# Patient Record
Sex: FEMALE | Race: WHITE | NOT HISPANIC OR LATINO | Employment: FULL TIME
[De-identification: names, ages, dates, MRNs, and addresses within clinical notes are randomized per-mention and may not be internally consistent; named-entity substitution may affect disease eponyms.]

---

## 2017-08-19 ENCOUNTER — HEALTH MAINTENANCE LETTER (OUTPATIENT)
Age: 37
End: 2017-08-19

## 2018-05-14 ENCOUNTER — OFFICE VISIT (OUTPATIENT)
Dept: FAMILY MEDICINE | Facility: CLINIC | Age: 38
End: 2018-05-14
Payer: COMMERCIAL

## 2018-05-14 VITALS
RESPIRATION RATE: 16 BRPM | OXYGEN SATURATION: 99 % | TEMPERATURE: 98 F | WEIGHT: 183.4 LBS | HEIGHT: 67 IN | DIASTOLIC BLOOD PRESSURE: 79 MMHG | SYSTOLIC BLOOD PRESSURE: 114 MMHG | HEART RATE: 68 BPM | BODY MASS INDEX: 28.79 KG/M2

## 2018-05-14 DIAGNOSIS — R21 RASH: ICD-10-CM

## 2018-05-14 DIAGNOSIS — Z80.41 FAMILY HISTORY OF MALIGNANT NEOPLASM OF OVARY: ICD-10-CM

## 2018-05-14 DIAGNOSIS — N30.10 INTERSTITIAL CYSTITIS: Primary | ICD-10-CM

## 2018-05-14 DIAGNOSIS — L71.9 ROSACEA: ICD-10-CM

## 2018-05-14 DIAGNOSIS — T78.40XA ALLERGIC DISORDER, INITIAL ENCOUNTER: ICD-10-CM

## 2018-05-14 PROBLEM — L71.8 OCULAR ROSACEA: Status: ACTIVE | Noted: 2018-04-25

## 2018-05-14 RX ORDER — TRIAMCINOLONE ACETONIDE 1 MG/G
CREAM TOPICAL
Refills: 2 | COMMUNITY
Start: 2018-02-23 | End: 2018-11-26

## 2018-05-14 NOTE — MR AVS SNAPSHOT
After Visit Summary   5/14/2018    Janet Ryder    MRN: [id number]           Patient Information     Date Of Birth          1980        Visit Information        Provider Department      5/14/2018 10:00 AM Leatha Robles MD [facility]        Today's Diagnoses     Interstitial cystitis    -  1    Family history of malignant neoplasm of ovary        Rosacea        Allergic disorder, initial encounter          Care Instructions    [facility] Allergy/Asthma-[facility] - [phone]   [facility]: [facility] [place] [phone]  - GENETICS  Here is the plan from today's visit    1. Interstitial cystitis  Referral to urology    2. Family history of malignant neoplasm of ovary  Referral to Women's Health - when you make the appointment, explain to them what you are looking for.   - GENETICS REFERRAL - INTERNAL    3. Rosacea    4. Allergic disorder, initial encounter  Call to set this up.   - ALLERGY/ASTHMA ADULT REFERRAL - INTERNAL      Come see me back and we will consider further lab testing for vasculitis and/or rheum referral.     Please call or return to clinic if your symptoms don't go away.    Follow up plan  Come see me for pap and breast exam.     Thank you for coming to [facility] today.  Lab Testing:  **If you had lab testing today and your results are reassuring or normal they will be mailed to you or sent through [employer] within 7 days.   **If the lab tests need quick action we will call you with the results.  The phone number we will call with results is # [phone] (home) . If this is not the best number please call our clinic and change the number.  Medication Refills:  If you need any refills please call your pharmacy and they will contact us.   If you need to  your refill at a new pharmacy, please contact the new pharmacy directly. The new pharmacy will help you get your medications transferred faster.   Scheduling:  If you have any  concerns about today's visit or wish to schedule another appointment please call our office during normal business hours [phone] (8-5:00 M-F)  If a referral was made to a [facility] and you don't get a call from central scheduling please call [phone].  If a Mammogram was ordered for you at [facility] call [phone] to schedule or change your appointment.  If you had an XRay/CT/Ultrasound/MRI ordered the number is [phone] to schedule or change your radiology appointment.   Medical Concerns:  If you have urgent medical concerns please call [phone] at any time of the day.  If you have a medical emergency please call [phone].    -------------------------------------------------------------------    Preventive Health Recommendations  Female Ages 26 - 39  Yearly exam:   See your health care provider every year in order to    Review health changes.     Discuss preventive care.      Review your medicines if you your doctor has prescribed any.    Until age 30: Get a Pap test every three years (more often if you have had an abnormal result).    After age 30: Talk to your doctor about whether you should have a Pap test every 3 years or have a Pap test with HPV screening every 5 years.   You do not need a Pap test if your uterus was removed (hysterectomy) and you have not had cancer.  You should be tested each year for STDs (sexually transmitted diseases), if you're at risk.   Talk to your provider about how often to have your cholesterol checked.  If you are at risk for diabetes, you should have a diabetes test (fasting glucose).  Shots: Get a flu shot each year. Get a tetanus shot every 10 years.   Nutrition:     Eat at least 5 servings of fruits and vegetables each day.    Eat whole-grain bread, whole-wheat pasta and brown rice instead of white grains and rice.    Talk to your provider about Calcium and Vitamin D.     Lifestyle    Exercise at least 150 minutes a week (30  minutes a day, 5 days of the week). This will help you control your weight and prevent disease.    Limit alcohol to one drink per day.    No smoking.     Wear sunscreen to prevent skin cancer.    See your dentist every six months for an exam and cleaning.            Follow-ups after your visit        Additional Services     ALLERGY/ASTHMA ADULT REFERRAL - INTERNAL       Your provider has referred you to: [facility] Allergy[facility]Asthma-[facility] - [phone]  [email or web address]    Please be aware that coverage of these services is subject to the terms and limitations of your health insurance plan.  Call member services at your health plan with any benefit or coverage questions.      Please bring the following with you to your appointment:    (1) Any X-Rays, CTs or MRIs which have been performed.  Contact the facility where they were done to arrange for  prior to your scheduled appointment.    (2) List of current medications  (3) This referral request   (4) Any documents/labs given to you for this referral            GENETICS REFERRAL - INTERNAL       Your provider has referred you to: [facility]: [facility] [phone]   [email or web address]    Please be aware that coverage of these services is subject to the terms and limitations of your health insurance plan.  Call member services at your health plan with any benefit or coverage questions.      Please bring the following with you to your appointment:    (1) Any X-Rays, CTs or MRIs which have been performed.  Contact the facility where they were done to arrange for  prior to your scheduled appointment.   (2) List of current medications   (3) This referral request   (4) Any documents/labs given to you for this referral                  Who to contact     Please call your clinic at [phone] to:    Ask questions about your health    Make or cancel  "appointments    Discuss your medicines    Learn about your test results    Speak to your doctor            Additional Information About Your Visit        [employer] Information     [employer] gives you secure access to your electronic health record. If you see a primary care provider, you can also send messages to your care team and make appointments. If you have questions, please call your primary care clinic.  If you do not have a primary care provider, please call [phone] and they will assist you.      [employer]Y is an electronic gateway that provides easy, online access to your medical records. With [employer], you can request a clinic appointment, read your test results, renew a prescription or communicate with your care team.     To access your existing account, please contact your [facility] or call [phone] for assistance.        Care EveryWhere ID     This is your Care EveryWhere ID. This could be used by other organizations to access your [facility] medical records  [id number]        Your Vitals Were     Pulse Temperature Respirations Height Last Period Pulse Oximetry    68 98  F (36.7  C) (Oral) 16 5' 6.73\" (169.5 cm) 04/30/2018 (Exact Date) 99%    BMI (Body Mass Index)                   28.96 kg/m2            Blood Pressure from Last 3 Encounters:   05/14/18 114/79   04/30/15 116/76   12/31/12 94/60    Weight from Last 3 Encounters:   05/14/18 183 lb 6.4 oz (83.2 kg)   12/31/12 165 lb (74.8 kg)   10/18/12 172 lb 6.4 oz (78.2 kg)              We Performed the Following     ALLERGY/ASTHMA ADULT REFERRAL - INTERNAL     GENETICS REFERRAL - INTERNAL          Today's Medication Changes          These changes are accurate as of 5/14/18 11:15 AM.  If you have any questions, ask your nurse or doctor.               Stop taking these medicines if you haven't already. Please contact your care team if you have questions.     cyclobenzaprine 10 MG tablet   Commonly known as:  FLEXERIL "   Stopped by:  Leatha Robles MD                    Primary Care Provider Office Phone # Fax #    Melinda Lewis, [place] -[phone] [address] [place] [address]        Equal Access to Services     JOE FORREST : Hadii aad ku hadasho Soomaali, waaxda luqadaha, qaybta kaalmada adeegyada, waxay idiin hayaan adearoldo khespinozasonam schwartz . So [facility] [phone].    ATENCIÓN: Si habla español, tiene a hernandez disposición servicios gratuitos de asistencia lingüística. Llame al [phone].    We comply with applicable federal civil rights laws and [place] laws. We do not discriminate on the basis of race, color, national origin, age, disability, sex, sexual orientation, or gender identity.            Thank you!     Thank you for choosing [facility]  for your care. Our goal is always to provide you with excellent care. Hearing back from our patients is one way we can continue to improve our services. Please take a few minutes to complete the written survey that you may receive in the mail after your visit with us. Thank you!             Your Updated Medication List - Protect others around you: Learn how to safely use, store and throw away your medicines at www.disposemymeds.org.          This list is accurate as of 5/14/18 11:15 AM.  Always use your most recent med list.                   Brand Name Dispense Instructions for use Diagnosis    DAILY MULTIVITAMIN PO      Take  by mouth.        FISH OIL PO      Take  by mouth.        fluticasone 50 MCG/ACT spray    FLONASE    3 Package    Spray 1 spray into both nostrils daily.    Seasonal allergies       triamcinolone 0.1 % cream    KENALOG     CHANELLE EXT AA BID        VITAMIN D PO      Take 5,000 Units by mouth.

## 2018-05-14 NOTE — PROGRESS NOTES
"  Female Physical Note          HPI         Concerns today:   Concern: Establish care- CPE   1. Patient's mother had ovarian cancer. She would like to be tested for that gene. Mother diagnosed early 60s.  Her mother  in her 40s. Maternal aunt had early hysterectomy and her great grandmother had some cancer that don't know.     2. Abdominal pain- x's 3 years on and off. Lower mid pain. Difficult trying to urinate right away. Bladder feels inflamed. Per [facility] she has inflammation of the bladder but no change in pain. Hx of constipation. No vaginal pain w/ urination  - comes and goes with severity, doesn't hurt when urinates, but at times feels like she can't start urination,  and that can be painful to point walks funny - can last for 2 weeks.  Never seen by urology.  Told she might have interstitial cystitis.     3. Sickness- in December her ceiling had water damage. Since then she has been feeling \"sick\" per pt on and off. She has had the flu, colds, chills, night sweats. A rash on both elbows. Used Kenolog cream but stopped using. Her  never gets sick per pt.     4. Also more constipation in the last few years.  She is a vegetarian/ pescatarian.      5. Has been told she has rosacea in her eyes, rosacea in her face. More when she eats and drinks stuff- but not pinned down to specific foods.       IN general feels like she is sick more than others. Sick of being ill.   December - had flu and VGE, then bronchitis and never has felt better from that.  House is really old and thought maybe if she was exposed to something there. Has also been very sick since then.     Also had a rash in December - painful, itchy, felt a bit like hives, breaking open, then got rashes on her elbows, most notable after showers. Ring around her elbows with some bruising after. Never had ezcema or other skin issues before. Does feel achey a lot, and her elbows are more achey and painful. Also has had more achey kness, and " thinks she might have hurt her right knee - is swollen after walking through [place] for a few weeks.     Been seen 4 times since December and did a bunch of tests - they did test for some food allergies.  Seen in [facility].  CRP was elevated. ESR - 9. TSH nl but low nl. Celiac was negative.    Hammer toe fixed and cyst removed.       Patient Active Problem List   Diagnosis     iamc SPRAIN SACROILIAC     iamc SPRAIN THORACIC REGION     Lumbosacral ligament sprain     iamc OTHER BACK SYMPTOMS     Contraception     CARDIOVASCULAR SCREENING; LDL GOAL LESS THAN 160     Abdominal pain     Seasonal allergies     Atypical migraine       Past Medical History:   Diagnosis Date     Atypical migraine        Previous Medical Care - [facility] and [facility] for cyst removals and foot surgery.       Family History   Problem Relation Age of Onset     Hypertension Mother      Lung Cancer Mother      Ovarian Cancer Mother      Alcohol/Drug Father      GASTROINTESTINAL DISEASE Maternal Grandmother      cerosis of liver     Cancer - colorectal Maternal Grandfather      age 75     CANCER Maternal Grandfather      melanoma     Respiratory Maternal Grandfather      Glaucoma Maternal Grandfather      CEREBROVASCULAR DISEASE Paternal Grandfather      Aneurysm Maternal Uncle      brain              Review of Systems:     Review of Systems:  See above - multiple positives  Sleep:   Do you snore most or the night (as reported by a family member)? No  Do you feel sleepy or extremely tired during most of the day? No             Social History     Social History     Social History     Marital status: Single     Spouse name: N/A     Number of children: N/A     Years of education: N/A     Occupational History      None           Social History Main Topics     Smoking status: Never Smoker     Smokeless tobacco: Never Used     Alcohol use Yes      Comment: 3 or 4 drinks per week     Drug use: No     Sexual  "activity: Yes     Partners: Male     Birth control/ protection: None     Other Topics Concern     Parent/Sibling W/ Cabg, Mi Or Angioplasty Before 65f 55m? No     Social History Narrative    Caffeine intake/servings daily - 1-2    Calcium intake/servings daily - 2+    Exercise 3 times weekly - describe swim, wts, basketball    Sunscreen used - Yes    Seatbelts used - Yes    Guns stored in the home - No    Self Breast Exam - No    Pap test up to date -  Yes    Eye exam up to date -  Yes    Dental exam up to date -  Yes    DEXA scan up to date -  Not Applicable    Flex Sig/Colonoscopy up to date -  Not Applicable    Mammography up to date -  Not Applicable    Immunizations reviewed and up to date - Yes    Abuse: Current or Past (Physical, Sexual or Emotional) - No    Do you feel safe in your environment - Yes    Do you cope well with stress - No    Do you suffer from insomnia - Yes    Last updated by: CARLOS CROSS  2011                           Marital Status:  Who lives in your household?     Has anyone hurt you physically, for example by pushing, hitting, slapping or kicking you or forcing you to have sex? NO  Do you feel threatened or controlled by a partner, ex-partner or anyone in your life? No    Sexual Health     Pregnancy History:   LMP Patient's last menstrual period was 2018 (exact date). recent  Last Pap Smear Date:   Lab Results   Component Value Date    PAP NIL 10/18/2012    PAP NIL 2011    PAP NIL 2010     Abnormal Pap History: None    Recommended Screening     Pap/HPV cotest every 5 years for women 30-65   Recommended and will postpone due to time constraints. and HIV screening:  Not discussed             Physical Exam:     Vitals: /79  Pulse 68  Temp 98  F (36.7  C) (Oral)  Resp 16  Ht 5' 6.73\" (169.5 cm)  Wt 183 lb 6.4 oz (83.2 kg)  LMP 2018 (Exact Date)  SpO2 99%  BMI 28.96 kg/m2  BMI= Body mass index is 28.96 kg/(m^2).   GENERAL: " healthy, alert and no distress  EYES: Eyes grossly normal to inspection, extraocular movements - intact, and PERRL  HENT: ear canals- normal; TMs- normal; Nose- normal; Mouth- no ulcers, no lesions  NECK: no tenderness, no adenopathy, no asymmetry, no masses, no stiffness; thyroid- normal to palpation  RESP: lungs clear to auscultation - no rales, no rhonchi, no wheezes  CV: regular rates and rhythm, normal S1 S2, no S3 or S4 and no murmur, no click or rub -  ABDOMEN: soft, no tenderness, no  hepatosplenomegaly, no masses, normal bowel sounds  MS: extremities- no gross deformities noted, no edema - all her joints are cool and no effusions noted.  Right knee with some crepitus and pain on patellar grind.  Nl ROM, no warmth or effusion.   SKIN: no suspicious lesions, no rashes  PSYCH: Alert and oriented times 3; speech- coherent , normal rate and volume; able to articulate logical thoughts, able to abstract reason, no tangential thoughts, no hallucinations or delusions, affect- normal  LYMPHATICS: ant. cervical- normal, post. cervical- normal, axillary- normal, supraclavicular- normal,       Assessment and Plan      Janet was seen today for establish care, abdominal pain and sick.    Diagnoses and all orders for this visit:    Interstitial cystitis - likely her diagnosis. Reviewed that her symptoms of intermittent difficulty passing urine may be part of this, that through PT and therapy can improve. Will refer to urology to confirm diagnosis and begin treatment plan.     Family history of malignant neoplasm of ovary - will refer to genetics to assure screening as appropriate. Reviewed that screening is not straightforward.   -     GENETICS REFERRAL - INTERNAL    Rosacea    Allergic disorder, initial encounter - she is very worried about mold, and the black liquid that came from her ceiling. Also noted mice and bats and so believe it is best that she see an allergist to review her concerns and better target testing  than for me to pick a panel,    -     ALLERGY/ASTHMA ADULT REFERRAL - INTERNAL    Did not have time for gyn and breast exam and so will return for these.     Rash - saw pictures of her rash - had ring of hyperemia around her elbows and then areas of bruising right after with some more erythematous papules. Had CRP that was elevated at past check so wonder if vasculitis?  Will look into how might work that up other than biopsy. Currently elbows are nl in appearance.   Consider ANCA, CRP and UA at next visit    Options for treatment and follow-up care were reviewed with the patient . Janet Ryder and/or guardian engaged in the decision making process and verbalized understanding of the options discussed and agreed with the final plan.    Lana Quintanilla, CMA

## 2018-05-14 NOTE — PATIENT INSTRUCTIONS
[facility] Allergy/Asthma-[facility] [place] - [phone]   [facility]: [facility] [phone]  - GENETICS  Here is the plan from today's visit    1. Interstitial cystitis  Referral to urology    2. Family history of malignant neoplasm of ovary  Referral to Women's Health - when you make the appointment, explain to them what you are looking for.   - GENETICS REFERRAL - INTERNAL    3. Rosacea    4. Allergic disorder, initial encounter  Call to set this up.   - ALLERGY/ASTHMA ADULT REFERRAL - INTERNAL      Come see me back and we will consider further lab testing for vasculitis and/or rheum referral.     Please call or return to clinic if your symptoms don't go away.    Follow up plan  Come see me for pap and breast exam.     Thank you for coming to [facility] today.  Lab Testing:  **If you had lab testing today and your results are reassuring or normal they will be mailed to you or sent through [employer] within 7 days.   **If the lab tests need quick action we will call you with the results.  The phone number we will call with results is # [phone] (home) . If this is not the best number please call our clinic and change the number.  Medication Refills:  If you need any refills please call your pharmacy and they will contact us.   If you need to  your refill at a new pharmacy, please contact the new pharmacy directly. The new pharmacy will help you get your medications transferred faster.   Scheduling:  If you have any concerns about today's visit or wish to schedule another appointment please call our office during normal business hours [phone] (8-5:00 M-F)  If a referral was made to a [facility] and you don't get a call from central scheduling please call [phone].  If a Mammogram was ordered for you at [facility] call [phone] to schedule or change your appointment.  If you had an XRay/CT/Ultrasound/MRI ordered the number is [phone]  to schedule or change your radiology appointment.   Medical Concerns:  If you have urgent medical concerns please call [phone] at any time of the day.  If you have a medical emergency please call [phone].    -------------------------------------------------------------------    Preventive Health Recommendations  Female Ages 26 - 39  Yearly exam:   See your health care provider every year in order to    Review health changes.     Discuss preventive care.      Review your medicines if you your doctor has prescribed any.    Until age 30: Get a Pap test every three years (more often if you have had an abnormal result).    After age 30: Talk to your doctor about whether you should have a Pap test every 3 years or have a Pap test with HPV screening every 5 years.   You do not need a Pap test if your uterus was removed (hysterectomy) and you have not had cancer.  You should be tested each year for STDs (sexually transmitted diseases), if you're at risk.   Talk to your provider about how often to have your cholesterol checked.  If you are at risk for diabetes, you should have a diabetes test (fasting glucose).  Shots: Get a flu shot each year. Get a tetanus shot every 10 years.   Nutrition:     Eat at least 5 servings of fruits and vegetables each day.    Eat whole-grain bread, whole-wheat pasta and brown rice instead of white grains and rice.    Talk to your provider about Calcium and Vitamin D.     Lifestyle    Exercise at least 150 minutes a week (30 minutes a day, 5 days of the week). This will help you control your weight and prevent disease.    Limit alcohol to one drink per day.    No smoking.     Wear sunscreen to prevent skin cancer.    See your dentist every six months for an exam and cleaning.

## 2018-06-01 ENCOUNTER — PRE VISIT (OUTPATIENT)
Dept: UROLOGY | Facility: CLINIC | Age: 38
End: 2018-06-01

## 2018-06-01 ENCOUNTER — OFFICE VISIT (OUTPATIENT)
Dept: FAMILY MEDICINE | Facility: CLINIC | Age: 38
End: 2018-06-01
Payer: COMMERCIAL

## 2018-06-01 VITALS
SYSTOLIC BLOOD PRESSURE: 102 MMHG | TEMPERATURE: 98 F | WEIGHT: 183 LBS | DIASTOLIC BLOOD PRESSURE: 70 MMHG | HEIGHT: 67 IN | BODY MASS INDEX: 28.72 KG/M2 | HEART RATE: 77 BPM | RESPIRATION RATE: 16 BRPM | OXYGEN SATURATION: 97 %

## 2018-06-01 DIAGNOSIS — Z00.00 ROUTINE GENERAL MEDICAL EXAMINATION AT A HEALTH CARE FACILITY: Primary | ICD-10-CM

## 2018-06-01 DIAGNOSIS — R21 RASH: ICD-10-CM

## 2018-06-01 DIAGNOSIS — M25.561 PATELLOFEMORAL ARTHRALGIA OF RIGHT KNEE: ICD-10-CM

## 2018-06-01 LAB
BILIRUBIN UR: NEGATIVE
BLOOD UR: ABNORMAL
CHOLEST SERPL-MCNC: 146.2 MG/DL (ref 0–200)
CHOLEST/HDLC SERPL: 2.9 {RATIO} (ref 0–5)
CRP SERPL-MCNC: <2.9 MG/L (ref 0–8)
GLUCOSE URINE: NEGATIVE
HDLC SERPL-MCNC: 51 MG/DL
HIV 1+2 AB+HIV1 P24 AG SERPL QL IA: NONREACTIVE
KETONES UR QL: NEGATIVE
LDLC SERPL CALC-MCNC: 88 MG/DL (ref 0–129)
LEUKOCYTE ESTERASE UR: ABNORMAL
NITRITE UR QL STRIP: NEGATIVE
PH UR STRIP: 7 [PH] (ref 5–7)
PROTEIN UR: NEGATIVE
SP GR UR STRIP: 1.01
TRIGL SERPL-MCNC: 36 MG/DL (ref 0–150)
UROBILINOGEN UR STRIP-ACNC: ABNORMAL
VLDL CHOLESTEROL: 7.2 MG/DL (ref 7–32)

## 2018-06-01 NOTE — MR AVS SNAPSHOT
After Visit Summary   6/1/2018    Janet Ryder    MRN: [id number]           Patient Information     Date Of Birth          1980        Visit Information        Provider Department      6/1/2018 9:00 AM Leatha Robles MD [facility]        Today's Diagnoses     Routine general medical examination at a health care facility    -  1    Rash        Patellofemoral arthralgia of right knee          Care Instructions    Here is the plan from today's visit    1. Rash - based on these tests, if positive, then refer to rheumatology. If negative, immunologist  - Urinalysis (UA) ([facility])  - Antineutrophil cytoplasmic Joslyn IgG  - CRP inflammation    2. Routine general medical examination at a health care facility  - Lipid [place] ([facility]) and HIV  - HPV High Risk Types DNA Cervical  - Pap imaged thin layer screen with HPV - recommended age 30 - 65 years (select HPV order below)    3. Patellofemoral arthralgia of right knee  Call one number to schedule at any of the above locations: [phone].  [facility] [facility].   - DANIELLE, PT, HAND AND CHIROPRACTIC REFERRAL - DANIELLE      Please call or return to clinic if your symptoms don't go away.    Follow up plan      Thank you for coming to [facility] today.  Lab Testing:  **If you had lab testing today and your results are reassuring or normal they will be mailed to you or sent through [employer] within 7 days.   **If the lab tests need quick action we will call you with the results.  The phone number we will call with results is # [phone] (home) . If this is not the best number please call our clinic and change the number.  Medication Refills:  If you need any refills please call your pharmacy and they will contact us.   If you need to  your refill at a new pharmacy, please contact the new pharmacy directly. The new pharmacy will help you get your medications transferred faster.   Scheduling:  If you have any  concerns about today's visit or wish to schedule another appointment please call our office during normal business hours [phone] (8-5:00 M-F)  If a referral was made to a [facility] and you don't get a call from central scheduling please call [phone].  If a Mammogram was ordered for you at [facility] call [phone] to schedule or change your appointment.  If you had an XRay/CT/Ultrasound/MRI ordered the number is [phone] to schedule or change your radiology appointment.   Medical Concerns:  If you have urgent medical concerns please call [phone] at any time of the day.  If you have a medical emergency please call [phone].          Follow-ups after your visit        Additional Services     DANIELLE, PT, HAND AND CHIROPRACTIC REFERRAL - [facility]       **This order will print in the [facility] Scheduling Office**    Physical Therapy, Hand Therapy and Chiropractic Care are available through:    *[facility]  *[facility]  *[facility]    Call one number to schedule at any of the above locations: [phone].    Your provider has referred you to: Physical Therapy at [facility] or [facility]    Indication/Reason for Referral: Knee Pain  Onset of Illness: months   Therapy Orders: Evaluate and Treat  Special Programs: None  Special Request: None    Maritza Salas      Additional Comments for the Therapist or Chiropractor: none    Please be aware that coverage of these services is subject to the terms and limitations of your health insurance plan.  Call member services at your health plan with any benefit or coverage questions.      Please bring the following to your appointment:    *Your personal calendar for scheduling future appointments  *Comfortable clothing                  Your next 10 appointments already scheduled     Jun 21, 2018  7:30 AM CDT   (Arrive by 7:15 AM)   NEW FEMALE PELVIC with Emily See Adán Snell MD    [facility]  "for Prostate and Urologic Cancers ([facility] [place])    [address]  [address] [facility] [address] [phone]              Who to contact     Please call your clinic at [phone] to:    Ask questions about your health    Make or cancel appointments    Discuss your medicines    Learn about your test results    Speak to your doctor            Additional Information About Your Visit        [employer] Information     [employer] gives you secure access to your electronic health record. If you see a primary care provider, you can also send messages to your care team and make appointments. If you have questions, please call your primary care clinic.  If you do not have a primary care provider, please call [phone] and they will assist you.      [employer] is an electronic gateway that provides easy, online access to your medical records. With [employer], you can request a clinic appointment, read your test results, renew a prescription or communicate with your care team.     To access your existing account, please contact your [facility] or call [phone] for assistance.        Care EveryWhere ID     This is your Care EveryWhere ID. This could be used by other organizations to access your [place] medical records  [id number]        Your Vitals Were     Pulse Temperature Respirations Height Pulse Oximetry Breastfeeding?    77 98  F (36.7  C) (Oral) 16 5' 6.73\" (169.5 cm) 97% No    BMI (Body Mass Index)                   28.89 kg/m2            Blood Pressure from Last 3 Encounters:   06/01/18 102/70   05/14/18 114/79   04/30/15 116/76    Weight from Last 3 Encounters:   06/01/18 183 lb (83 kg)   05/14/18 183 lb 6.4 oz (83.2 kg)   12/31/12 165 lb (74.8 kg)              We Performed the Following     Antineutrophil cytoplasmic Joslyn IgG     CRP inflammation     HIV Antigen Antibody Combo     HPV High Risk Types DNA Cervical     DANIELLE, PT, HAND AND CHIROPRACTIC " REFERRAL - DANIELLE     Lipid [place] (hospitals)     Pap imaged thin layer screen with HPV - recommended age 30 - 65 years (select HPV order below)     Urinalysis (UA) (hospitals)        Primary Care Provider Office Phone # Fax #    KOSTAS Bailon [phone] [address] [place] [address]        Equal Access to Services     JOE FORREST : Hadii aad ku hadasho Soomaali, waaxda luqadaha, qaybta kaalmada adeegyada, waxay idiin hayaan adeeg kharash la'aan . So [facility] [phone].    ATENCIÓN: Si loanla espnathalie, tiene a hernandez disposición servicios gratuitos de asistencia lingüística. Llame al [phone].    We comply with applicable federal civil rights laws and [place] laws. We do not discriminate on the basis of race, color, national origin, age, disability, sex, sexual orientation, or gender identity.            Thank you!     Thank you for choosing [facility]  for your care. Our goal is always to provide you with excellent care. Hearing back from our patients is one way we can continue to improve our services. Please take a few minutes to complete the written survey that you may receive in the mail after your visit with us. Thank you!             Your Updated Medication List - Protect others around you: Learn how to safely use, store and throw away your medicines at www.disposemymeds.org.          This list is accurate as of 6/1/18  9:59 AM.  Always use your most recent med list.                   Brand Name Dispense Instructions for use Diagnosis    DAILY MULTIVITAMIN PO      Take  by mouth.        FISH OIL PO      Take  by mouth.        fluticasone 50 MCG/ACT spray    FLONASE    3 Package    Spray 1 spray into both nostrils daily.    Seasonal allergies       triamcinolone 0.1 % cream    KENALOG     CHANELLE EXT AA BID        VITAMIN D PO      Take 5,000 Units by mouth.

## 2018-06-01 NOTE — PATIENT INSTRUCTIONS
Here is the plan from today's visit    1. Rash - based on these tests, if positive, then refer to rheumatology. If negative, immunologist  - Urinalysis (UA) ([facility])  - Antineutrophil cytoplasmic Joslyn IgG  - CRP inflammation    2. Routine general medical examination at a health care facility  - Lipid [place] ([facility]) and HIV  - HPV High Risk Types DNA Cervical  - Pap imaged thin layer screen with HPV - recommended age 30 - 65 years (select HPV order below)    3. Patellofemoral arthralgia of right knee  Call one number to schedule at any of the above locations: [phone].  [place] [facility].   - DANIELLE, PT, HAND AND CHIROPRACTIC REFERRAL - DANIELLE      Please call or return to clinic if your symptoms don't go away.    Follow up plan      Thank you for coming to [facility] today.  Lab Testing:  **If you had lab testing today and your results are reassuring or normal they will be mailed to you or sent through [employer] within 7 days.   **If the lab tests need quick action we will call you with the results.  The phone number we will call with results is # [phone] (home) . If this is not the best number please call our clinic and change the number.  Medication Refills:  If you need any refills please call your pharmacy and they will contact us.   If you need to  your refill at a new pharmacy, please contact the new pharmacy directly. The new pharmacy will help you get your medications transferred faster.   Scheduling:  If you have any concerns about today's visit or wish to schedule another appointment please call our office during normal business hours [phone] (8-5:00 M-F)  If a referral was made to a [facility] and you don't get a call from central scheduling please call [phone].  If a Mammogram was ordered for you at [facility] call [phone] to schedule or change your appointment.  If you had an XRay/CT/Ultrasound/MRI ordered the number is  [phone] to schedule or change your radiology appointment.   Medical Concerns:  If you have urgent medical concerns please call [phone] at any time of the day.  If you have a medical emergency please call [phone].

## 2018-06-01 NOTE — PROGRESS NOTES
"      HPI:       Janet Ryder is a 38 year old who presents for the following  Patient presents with:  Physical: Needs to have pap  Pain: In right knee, was walking more than normal while on vacation [id number] ago    Has lumpy breasts that change all the time. Gets freaked out - so told not to do them. Had a pocket drained on the right -     Allergy not available until 9/2018 and hoping that she can find someone else    Also gets sick all the time     Right knee is still problematic, hurts to go up and down stairs and hurts to fully stretch it out.  Has not done any exercises.  Cracking more.     History of hip dysplasia - they did not figure it out until she was in her late 20s, and then at times she notices that her hip slides out of place which has stopped her from running. Also been told that her legs are not the same length.  Tries swimming more instead of exercise.       Concern: Pap   Description of the problem :Needs to finish CPE         Adherence and Exercise  Medication side effects: no  How often is a medication missed? Never  Exercise:No exercise None      Joint/Muscle Pain      Onset: 3 Weeks ago    Injury?  Yes Details: Was walking more than normal, didn't hit or fall on it just over use    Description:   Location(s): Right Knee  Character: Dull ache    Intensity: moderate    Progression of Symptoms: same    Accompanying Signs & Symptoms:  Other symptoms: none    History:   Previous similar pain: no      Worsened by    Overuse?: Yes  Morning Stiffness?:no    Alleviating factors:  Improved by: nothing  Therapies Tried and outcome: Nothing    Problem, Medication and Allergy Lists were reviewed and are current.  Patient is an established patient of this clinic.         Review of Systems:   Review of Systems          Physical Exam:   Patient Vitals for the past 24 hrs:   BP Temp Temp src Pulse Resp SpO2 Height Weight   06/01/18 0915 102/70 98  F (36.7  C) Oral 77 16 97 % 5' 6.73\" (169.5 cm) 183 lb (83 " "kg)     Body mass index is 28.89 kg/(m^2).  Vitals were reviewed and were normal  Blood pressure 102/70, pulse 77, temperature 98  F (36.7  C), temperature source Oral, resp. rate 16, height 5' 6.73\" (169.5 cm), weight 183 lb (83 kg), SpO2 97 %, not currently breastfeeding.       Physical Exam   Pulmonary/Chest: Right breast exhibits no inverted nipple, no nipple discharge and no skin change. Left breast exhibits no inverted nipple, no nipple discharge and no skin change.       Had an fibrocystic changes of her breasts.  Multiple irregular nodularities palpable somewhat firm, worse on the right lower quadrant of her right breast and left upper quadrant left breast.   Genitourinary:   Genitourinary Comments: .  Normal pelvic exam vulva, vagina and cervix.  Uterus normal size.  It, no palpable ovaries.     Both knees without swelling warmth or erythema.  Right knee joint line tenderness.  Normal range of motion.  Has some tenderness upon patellar movement and crepitus.    Results:       Assessment and Plan     Janet was seen today for physical and pain.    Diagnoses and all orders for this visit:    Routine general medical examination at a health care facility -routine prevention.  Reassured about her breast exam  -     Lipid [place] ([facility])  -     HPV High Risk Types DNA Cervical  -     Pap imaged thin layer screen with HPV - recommended age 30 - 65 years (select HPV order below)  -     HIV Antigen Antibody Combo    Rash -erythema with ecchymosis at her last visit, so we will do a very basic vasculitis workup.  Talked about having her see the allergist, and if that does not panel that we would consider immunologic workup.  -     Urinalysis (UA) ([facility])  -     Antineutrophil cytoplasmic Joslyn IgG  -     CRP inflammation    Patellofemoral arthralgia of right knee  -exam consistent with  patellofemoral and will -send her for physical therapy.  -     DANIELLE, PT, HAND AND CHIROPRACTIC REFERRAL - DANIELLE      There are no " discontinued medications.  Options for treatment and follow-up care were reviewed with the patient. Janet Ryder  engaged in the decision making process and verbalized understanding of the options discussed and agreed with the final plan.    Leatha Robles MD

## 2018-06-01 NOTE — TELEPHONE ENCOUNTER
MEDICAL RECORDS REQUEST   [facility]  Urology Clinic  [address] [place] [address]  PHONE: [phone]  Fax: [phone]        FUTURE VISIT INFORMATION                                                   aJnet Ryder, : 1980 scheduled for future visit at [facility]    APPOINTMENT INFORMATION:    Date: 2018    Provider:  SUHA GONZALEZ    Reason for Visit/Diagnosis: IC    REFERRAL INFORMATION:    Referring provider:  ELKE METCALF    Specialty: PCP    Referring providers clinic:  [facility] contact number:  [phone]    RECORDS REQUESTED FOR VISIT                                                     NOTES  STATUS/DETAILS   OFFICE NOTE from referring provider  yes   OFFICE NOTE from other specialist  no   DISCHARGE SUMMARY from hospital  no   DISCHARGE REPORT from the ER  no   OPERATIVE REPORT  no   MEDICATION LIST  yes       PRE-VISIT CHECKLIST      Record collection complete Yes   Appointment appropriately scheduled           (right time/right provider) Yes   Joint diagnostic appointment coordinated correctly          (ensure right order & amount of time) Yes   MyChart activation Yes   Questionnaire complete If no, please explain IN PROCESS     Completed by: Suha Bernal

## 2018-06-03 LAB — ANCA IGG TITR SER IF: NORMAL {TITER}

## 2018-06-05 LAB
COPATH REPORT: NORMAL
PAP: NORMAL

## 2018-06-07 ENCOUNTER — THERAPY VISIT (OUTPATIENT)
Dept: PHYSICAL THERAPY | Facility: CLINIC | Age: 38
End: 2018-06-07
Payer: COMMERCIAL

## 2018-06-07 DIAGNOSIS — M25.561 ACUTE PAIN OF RIGHT KNEE: Primary | ICD-10-CM

## 2018-06-07 LAB
FINAL DIAGNOSIS: NORMAL
HPV HR 12 DNA CVX QL NAA+PROBE: NEGATIVE
HPV16 DNA SPEC QL NAA+PROBE: NEGATIVE
HPV18 DNA SPEC QL NAA+PROBE: NEGATIVE
SPECIMEN DESCRIPTION: NORMAL
SPECIMEN SOURCE CVX/VAG CYTO: NORMAL

## 2018-06-07 PROCEDURE — 97161 PT EVAL LOW COMPLEX 20 MIN: CPT | Mod: GP | Performed by: PHYSICAL THERAPIST

## 2018-06-07 PROCEDURE — 97110 THERAPEUTIC EXERCISES: CPT | Mod: GP | Performed by: PHYSICAL THERAPIST

## 2018-06-07 ASSESSMENT — ACTIVITIES OF DAILY LIVING (ADL)
SQUAT: ACTIVITY IS FAIRLY DIFFICULT
GIVING WAY, BUCKLING OR SHIFTING OF KNEE: THE SYMPTOM AFFECTS MY ACTIVITY SLIGHTLY
SIT WITH YOUR KNEE BENT: ACTIVITY IS MINIMALLY DIFFICULT
GO DOWN STAIRS: ACTIVITY IS SOMEWHAT DIFFICULT
PAIN: THE SYMPTOM AFFECTS MY ACTIVITY MODERATELY
WALK: ACTIVITY IS SOMEWHAT DIFFICULT
KNEE_ACTIVITY_OF_DAILY_LIVING_SCORE: 57.14
LIMPING: THE SYMPTOM AFFECTS MY ACTIVITY SLIGHTLY
WEAKNESS: THE SYMPTOM AFFECTS MY ACTIVITY SLIGHTLY
RAW_SCORE: 40
KNEEL ON THE FRONT OF YOUR KNEE: ACTIVITY IS SOMEWHAT DIFFICULT
GO UP STAIRS: ACTIVITY IS SOMEWHAT DIFFICULT
HOW_WOULD_YOU_RATE_THE_CURRENT_FUNCTION_OF_YOUR_KNEE_DURING_YOUR_USUAL_DAILY_ACTIVITIES_ON_A_SCALE_FROM_0_TO_100_WITH_100_BEING_YOUR_LEVEL_OF_KNEE_FUNCTION_PRIOR_TO_YOUR_INJURY_AND_0_BEING_THE_INABILITY_TO_PERFORM_ANY_OF_YOUR_USUAL_DAILY_ACTIVITIES?: 65
STIFFNESS: THE SYMPTOM AFFECTS MY ACTIVITY MODERATELY
KNEE_ACTIVITY_OF_DAILY_LIVING_SUM: 40
HOW_WOULD_YOU_RATE_THE_OVERALL_FUNCTION_OF_YOUR_KNEE_DURING_YOUR_USUAL_DAILY_ACTIVITIES?: ABNORMAL
AS_A_RESULT_OF_YOUR_KNEE_INJURY,_HOW_WOULD_YOU_RATE_YOUR_CURRENT_LEVEL_OF_DAILY_ACTIVITY?: SEVERELY ABNORMAL
SWELLING: THE SYMPTOM AFFECTS MY ACTIVITY MODERATELY
STAND: ACTIVITY IS MINIMALLY DIFFICULT
RISE FROM A CHAIR: ACTIVITY IS SOMEWHAT DIFFICULT

## 2018-06-07 NOTE — MR AVS SNAPSHOT
After Visit Summary   6/7/2018    Janet Ryder    MRN: [id number]           Patient Information     Date Of Birth          1980        Visit Information        Provider Department      6/7/2018 4:40 PM Ceferino Chan, PT [facility]        Today's Diagnoses     Acute pain of right knee    -  1       Follow-ups after your visit        Your next 10 appointments already scheduled     Jun 13, 2018  1:20 PM CDT   DANIELLE Extremity with Ceferino Chan PT   [facility] ([facility]  )    [address] [place] [id number]   [phone]            Jun 19, 2018  4:00 PM CDT   DANIELLE Extremity with Ceferino Chan PT   [facility] ([facility]  )    [address] [place] [address]   [phone]            Jun 21, 2018  7:30 AM CDT   (Arrive by 7:15 AM)   NEW FEMALE PELVIC with Emily See Adán Snell MD   [facility] for Prostate and Urologic Cancers ([facility])    [address] [place] [address] [phone]              Who to contact     If you have questions or need follow up information about today's clinic visit or your schedule please contact [facility] directly at [phone].  Normal or non-critical lab and imaging results will be communicated to you by MyChart, letter or phone within 4 business days after the clinic has received the results. If you do not hear from us within 7 days, please contact the clinic through MyChart or phone. If you have a critical or abnormal lab result, we will notify you by phone as soon as possible.  Submit refill requests through [employer] or call your pharmacy and they will forward the refill request to us. Please allow 3 business days for your refill to be  completed.          Additional Information About Your Visit        [employer] Information     [employer] gives you secure access to your electronic health record. If you see a primary care provider, you can also send messages to your care team and make appointments. If you have questions, please call your primary care clinic.  If you do not have a primary care provider, please call [phone] and they will assist you.        Care EveryWhere ID     This is your Care EveryWhere ID. This could be used by other organizations to access your [place] medical records  [id number]         Blood Pressure from Last 3 Encounters:   06/01/18 102/70   05/14/18 114/79   04/30/15 116/76    Weight from Last 3 Encounters:   06/01/18 83 kg (183 lb)   05/14/18 83.2 kg (183 lb 6.4 oz)   12/31/12 74.8 kg (165 lb)              We Performed the Following     DANIELLE Inital Eval Report     PT Eval, Low Complexity ([id number])     Therapeutic Exercises        Primary Care Provider Office Phone # Fax #    KOSTAS Bailon [facility] [phone] [address] [place] [address]        Equal Access to Services     JOE FORREST AH: Hadii aad ku hadasho Soomaali, waaxda luqadaha, qaybta kaalmada adeegyada, waxay evangelistin haykiannan reta schwartz ah. So [facility] [phone].    ATENCIÓN: Si habla español, tiene a hernandez disposición servicios gratuitos de asistencia lingüística. Melodie al [phone].    We comply with applicable federal civil rights laws and [place] laws. We do not discriminate on the basis of race, color, national origin, age, disability, sex, sexual orientation, or gender identity.            Thank you!     Thank you for choosing [facility]  for your care. Our goal is always to provide you with excellent care. Hearing back from our patients is one way we can continue to improve our services. Please take a few minutes to complete the written survey that you  may receive in the mail after your visit with us. Thank you!             Your Updated Medication List - Protect others around you: Learn how to safely use, store and throw away your medicines at www.disposemymeds.org.          This list is accurate as of 6/7/18 11:59 PM.  Always use your most recent med list.                   Brand Name Dispense Instructions for use Diagnosis    DAILY MULTIVITAMIN PO      Take  by mouth.        FISH OIL PO      Take  by mouth.        fluticasone 50 MCG/ACT spray    FLONASE    3 Package    Spray 1 spray into both nostrils daily.    Seasonal allergies       triamcinolone 0.1 % cream    KENALOG     CHANELLE EXT AA BID        VITAMIN D PO      Take 5,000 Units by mouth.

## 2018-06-07 NOTE — PROGRESS NOTES
[facility] Initial Evaluation  Subjective:  Patient is a 38 year old female presenting with rehab right knee hpi.   Janet Ryder is a 38 year old female with a right knee condition.  Condition occurred with:  Repetition/overuse.  Condition occurred: in the community.  This is a new condition  May 2018. Patient started having R knee pain with prolonged walking during trip to [place] about 4 weeks ago. Denies history of R knee problems..    Patient reports pain:  Anterior, sub patellar, medial and lateral.    Pain is described as aching and sharp and is constant and reported as 3/10 and 6/10.  Associated symptoms:  Edema. Pain is worse during the day.  Symptoms are exacerbated by descending stairs, ascending stairs, walking and bending/squatting and relieved by rest.  Since onset symptoms are unchanged.        General health as reported by patient is fair (decreased fitnes level).  Pertinent medical history includes:  None.  Medical allergies: yes (see chart for complete list).  Other surgeries include:  None reported.  Current medications:  Other (Flonase for seasonal allergies).  Current occupation is Commercial mortgage/banking.  Patient is currently not working due to present treatment problem.  Primary job tasks include:  Other (computer work).    Barriers include:  None as reported by the patient.    Red flags:  None as reported by the patient.                        Objective:    Gait:    Gait Type:  Normal   Assistive Devices:  None  Deviations:  Hip:  Decr dynamic control R and decr dynamic control LKnee:  Transverse plane rotation R and transverse plane rotation L                                                      Knee Evaluation:  ROM:    AROM    Hyperextension:  Left:  WNL    Right: WNL +    Flexion: Left: WNL    Right: 136 -/+  PROM        Flexion: Left:   Right:  138 -/+      Strength:         Quad Set Left:  WNL    Pain: -   Quad Set Right:  WNL    Pain: +        Edema:  Edema  of the knee: Slight edema R knee.      Functional Testing:          Quad:    Single Leg Squat:  Left:       Right:        Bilateral Leg Squat:  R knee pain  Femoral IR, moderate loss of control and excessive anterior knee excursion              General     ROS    Assessment/Plan:    Patient is a 38 year old female with right side knee complaints.    Patient has the following significant findings with corresponding treatment plan.                Diagnosis 1:  Knee pain  Pain -  hot/cold therapy, manual therapy, splint/taping/bracing/orthotics, self management, education and home program  Decreased ROM/flexibility - manual therapy, therapeutic exercise and home program  Decreased strength - therapeutic exercise, therapeutic activities and home program  Decreased proprioception - neuro re-education, therapeutic activities and home program  Impaired muscle performance - neuro re-education and home program  Decreased function - therapeutic activities and home program    Therapy Evaluation Codes:   1) History comprised of:   Personal factors that impact the plan of care:      None.    Comorbidity factors that impact the plan of care are:      None.     Medications impacting care: None.  2) Examination of Body Systems comprised of:   Body structures and functions that impact the plan of care:      Knee.   Activity limitations that impact the plan of care are:      Sitting, Squatting/kneeling, Stairs, Standing and Walking.  3) Clinical presentation characteristics are:   Stable/Uncomplicated.  4) Decision-Making    Low complexity using standardized patient assessment instrument and/or measureable assessment of functional outcome.  Cumulative Therapy Evaluation is: Low complexity.    Previous and current functional limitations:  (See Goal Flow Sheet for this information)    Short term and Long term goals: (See Goal Flow Sheet for this information)     Communication ability:  Patient appears to be able to clearly communicate  and understand verbal and written communication and follow directions correctly.  Treatment Explanation - The following has been discussed with the patient:   RX ordered/plan of care  Anticipated outcomes  Possible risks and side effects  This patient would benefit from PT intervention to resume normal activities.   Rehab potential is good.    Frequency:  1 X week, once daily  Duration:  for 4 weeks tapering to 1 X every other week over 4 weeks  Discharge Plan:  Achieve all LTG.  Independent in home treatment program.  Reach maximal therapeutic benefit.    Please refer to the daily flowsheet for treatment today, total treatment time and time spent performing 1:1 timed codes.

## 2018-06-12 PROBLEM — M25.561 ACUTE PAIN OF RIGHT KNEE: Status: ACTIVE | Noted: 2018-06-12

## 2018-06-13 ENCOUNTER — THERAPY VISIT (OUTPATIENT)
Dept: PHYSICAL THERAPY | Facility: CLINIC | Age: 38
End: 2018-06-13
Payer: COMMERCIAL

## 2018-06-13 DIAGNOSIS — M25.561 ACUTE PAIN OF RIGHT KNEE: ICD-10-CM

## 2018-06-13 PROCEDURE — 97110 THERAPEUTIC EXERCISES: CPT | Mod: GP | Performed by: PHYSICAL THERAPIST

## 2018-06-13 PROCEDURE — 97140 MANUAL THERAPY 1/> REGIONS: CPT | Mod: GP | Performed by: PHYSICAL THERAPIST

## 2018-06-13 PROCEDURE — 97112 NEUROMUSCULAR REEDUCATION: CPT | Mod: GP | Performed by: PHYSICAL THERAPIST

## 2018-06-14 ENCOUNTER — PRE VISIT (OUTPATIENT)
Dept: UROLOGY | Facility: CLINIC | Age: 38
End: 2018-06-14

## 2018-06-14 NOTE — TELEPHONE ENCOUNTER
Reason for visit: IC consult     Relevant information: pt has never seen a urologist    Records/imaging/labs: referral note available    Pt called: No need for a call    Rooming: dip/pvr

## 2018-06-19 ENCOUNTER — THERAPY VISIT (OUTPATIENT)
Dept: PHYSICAL THERAPY | Facility: CLINIC | Age: 38
End: 2018-06-19
Payer: COMMERCIAL

## 2018-06-19 DIAGNOSIS — M25.561 ACUTE PAIN OF RIGHT KNEE: ICD-10-CM

## 2018-06-19 PROCEDURE — 97112 NEUROMUSCULAR REEDUCATION: CPT | Mod: GP | Performed by: PHYSICAL THERAPIST

## 2018-06-19 PROCEDURE — 97110 THERAPEUTIC EXERCISES: CPT | Mod: GP | Performed by: PHYSICAL THERAPIST

## 2018-06-20 ASSESSMENT — ENCOUNTER SYMPTOMS
HOT FLASHES: 0
HALLUCINATIONS: 0
BACK PAIN: 1
COUGH DISTURBING SLEEP: 0
HEMATURIA: 0
DECREASED APPETITE: 0
MYALGIAS: 1
MUSCLE CRAMPS: 1
DYSURIA: 0
JOINT SWELLING: 1
POLYPHAGIA: 0
SKIN CHANGES: 0
STIFFNESS: 1
NAIL CHANGES: 0
INCREASED ENERGY: 1
COUGH: 1
SPUTUM PRODUCTION: 1
DECREASED LIBIDO: 1
CHILLS: 1
POSTURAL DYSPNEA: 0
POOR WOUND HEALING: 0
FATIGUE: 1
MUSCLE WEAKNESS: 1
NIGHT SWEATS: 0
FLANK PAIN: 1
HEMOPTYSIS: 0
SNORES LOUDLY: 0
WHEEZING: 0
FEVER: 1
DIFFICULTY URINATING: 0
DYSPNEA ON EXERTION: 0
ALTERED TEMPERATURE REGULATION: 1
WEIGHT LOSS: 0
ARTHRALGIAS: 1
POLYDIPSIA: 0
WEIGHT GAIN: 0
NECK PAIN: 0
SHORTNESS OF BREATH: 0

## 2018-06-21 ENCOUNTER — OFFICE VISIT (OUTPATIENT)
Dept: UROLOGY | Facility: CLINIC | Age: 38
End: 2018-06-21
Payer: COMMERCIAL

## 2018-06-21 VITALS
DIASTOLIC BLOOD PRESSURE: 71 MMHG | SYSTOLIC BLOOD PRESSURE: 110 MMHG | WEIGHT: 184 LBS | HEIGHT: 67 IN | HEART RATE: 69 BPM | BODY MASS INDEX: 28.88 KG/M2

## 2018-06-21 DIAGNOSIS — N94.10 DYSPAREUNIA IN FEMALE: ICD-10-CM

## 2018-06-21 DIAGNOSIS — M16.31 OSTEOARTHRITIS RESULTING FROM RIGHT HIP DYSPLASIA: ICD-10-CM

## 2018-06-21 DIAGNOSIS — N32.81 URGENCY-FREQUENCY SYNDROME: ICD-10-CM

## 2018-06-21 DIAGNOSIS — R39.89 BLADDER PAIN: Primary | ICD-10-CM

## 2018-06-21 LAB
APPEARANCE UR: CLEAR
BILIRUB UR QL: NORMAL
COLOR UR: YELLOW
GLUCOSE URINE: NORMAL MG/DL
HGB UR QL: NORMAL
KETONES UR QL: NORMAL MG/DL
LEUKOCYTE ESTERASE URINE: NORMAL
NITRITE UR QL STRIP: NORMAL
PH UR STRIP: 7 PH (ref 5–7)
PROTEIN ALBUMIN URINE: NORMAL MG/DL
SOURCE: NORMAL
SP GR UR STRIP: 1 (ref 1–1.03)
UROBILINOGEN UR QL STRIP: 0.2 EU/DL (ref 0.2–1)

## 2018-06-21 ASSESSMENT — ENCOUNTER SYMPTOMS
NERVOUS/ANXIOUS: 0
BREAST MASS: 0
EYE PAIN: 0
DECREASED APPETITE: 0
FEVER: 1
CHILLS: 1
LOSS OF CONSCIOUSNESS: 0
TREMORS: 0
SKIN CHANGES: 0
LEG PAIN: 0
LEG SWELLING: 0
BOWEL INCONTINENCE: 0
HOT FLASHES: 0
NECK PAIN: 0
EYE IRRITATION: 0
NIGHT SWEATS: 0
SYNCOPE: 0
LIGHT-HEADEDNESS: 0
HYPOTENSION: 0
WEIGHT GAIN: 0
CONSTIPATION: 0
FLANK PAIN: 1
HEMOPTYSIS: 0
PARALYSIS: 0
TASTE DISTURBANCE: 0
EXTREMITY NUMBNESS: 0
BLOATING: 0
DYSPNEA ON EXERTION: 0
MUSCLE CRAMPS: 1
DYSURIA: 0
NAUSEA: 0
SPUTUM PRODUCTION: 1
HOARSE VOICE: 0
SORE THROAT: 0
DECREASED CONCENTRATION: 0
SEIZURES: 0
INSOMNIA: 0
BRUISES/BLEEDS EASILY: 0
RECTAL PAIN: 0
SWOLLEN GLANDS: 0
SPEECH CHANGE: 0
EYE WATERING: 0
MEMORY LOSS: 0
SINUS CONGESTION: 0
MUSCLE WEAKNESS: 1
SMELL DISTURBANCE: 0
DIZZINESS: 0
DIFFICULTY URINATING: 0
SINUS PAIN: 0
DECREASED LIBIDO: 1
NECK MASS: 0
ORTHOPNEA: 0
POOR WOUND HEALING: 0
FATIGUE: 1
BREAST PAIN: 0
EYE REDNESS: 0
ALTERED TEMPERATURE REGULATION: 1
SLEEP DISTURBANCES DUE TO BREATHING: 0
ARTHRALGIAS: 1
STIFFNESS: 1
SHORTNESS OF BREATH: 0
POLYDIPSIA: 0
COUGH DISTURBING SLEEP: 0
DEPRESSION: 0
ABDOMINAL PAIN: 0
COUGH: 1
HYPERTENSION: 0
BACK PAIN: 1
NAIL CHANGES: 0
VOMITING: 0
BLOOD IN STOOL: 0
EXERCISE INTOLERANCE: 0
HEARTBURN: 0
HEADACHES: 0
NUMBNESS: 0
HEMATURIA: 0
TROUBLE SWALLOWING: 0
WEAKNESS: 0
PANIC: 0
POLYPHAGIA: 0
INCREASED ENERGY: 1
WEIGHT LOSS: 0
JOINT SWELLING: 1
TACHYCARDIA: 0
PALPITATIONS: 0
DIARRHEA: 0
SNORES LOUDLY: 0
WHEEZING: 0
TINGLING: 0
CLAUDICATION: 0
DISTURBANCES IN COORDINATION: 0
RECTAL BLEEDING: 0
HALLUCINATIONS: 0
POSTURAL DYSPNEA: 0
JAUNDICE: 0
DOUBLE VISION: 0
MYALGIAS: 1

## 2018-06-21 ASSESSMENT — PAIN SCALES - GENERAL: PAINLEVEL: NO PAIN (0)

## 2018-06-21 NOTE — PROGRESS NOTES
June 21, 2018    Referring Provider: Leatha Robles MD  [address] [address]    Primary Care Provider: Melinda Lewis    CC: Bladder pain    HPI:  Janet Ryder is a 38 year old female who presents for evaluation of her pelvic floor symptoms.  She has a long-standing history of chronic pain including lumbosacral pain related to hip dysplasia for which she is currently undergoing physical therapy.  She reports that she has had bladder pain since at least 2015.  She has a constant mild burning in the area of her bladder, although most days she is able to ignore this and proceed with her daily activities.  Approximately once a week, she has a flare of pain that is severe enough that she must take pain medication and has limitations in her activities.  She endorses a several year history of urinary urgency with occasional small volume urge incontinence.  She denies frequency, stating that she voids roughly every 3-4 hours and none at night.  She denies hematuria or dysuria.  She denies urinary tract infections.  She has never seen a urologist before and has never had cystoscopy.  She is  and sexually active and endorses mild dyspareunia which has been long-standing.  She has never been pregnant.  She does have constipation at baseline with a bowel movement every other day.  She occasionally takes MiraLAX for this.  She does regularly see a gynecologist and had her last annual exam earlier this year.      Past Medical History:   Diagnosis Date     Atypical migraine        Past Surgical History:   Procedure Laterality Date     C NONSPECIFIC PROCEDURE  AGE 6, AGE 10    REMOVAL OF FACIAL SKIN CYSTS     C NONSPECIFIC PROCEDURE  AGE 22    SUTURES FOR FACIAL LACERATION     ORTHOPEDIC SURGERY     No history of pelvic surgeries.     Social History     Social History     Marital status: Single     Spouse name: N/A     Number of children: N/A     Years of education: N/A      Occupational History      None           Social History Main Topics     Smoking status: Never Smoker     Smokeless tobacco: Never Used     Alcohol use Yes      Comment: 3 or 4 drinks per week     Drug use: No     Sexual activity: Yes     Partners: Male     Birth control/ protection: None     Other Topics Concern     Parent/Sibling W/ Cabg, Mi Or Angioplasty Before 65f 55m? No     Social History Narrative    Caffeine intake/servings daily - 1-2    Calcium intake/servings daily - 2+    Exercise 3 times weekly - describe swim, wts, basketball    Sunscreen used - Yes    Seatbelts used - Yes    Guns stored in the home - No    Self Breast Exam - No    Pap test up to date -  Yes    Eye exam up to date -  Yes    Dental exam up to date -  Yes    DEXA scan up to date -  Not Applicable    Flex Sig/Colonoscopy up to date -  Not Applicable    Mammography up to date -  Not Applicable    Immunizations reviewed and up to date - Yes    Abuse: Current or Past (Physical, Sexual or Emotional) - No    Do you feel safe in your environment - Yes    Do you cope well with stress - No    Do you suffer from insomnia - Yes    Last updated by: CARLOS CROSS  5/11/2011                         Family History   Problem Relation Age of Onset     Hypertension Mother      Lung Cancer Mother      Ovarian Cancer Mother      Alcohol/Drug Father      GASTROINTESTINAL DISEASE Maternal Grandmother      cerosis of liver     Cancer - colorectal Maternal Grandfather      age 75     Cancer Maternal Grandfather      melanoma     Respiratory Maternal Grandfather      Glaucoma Maternal Grandfather      Cerebrovascular Disease Paternal Grandfather      Aneurysm Maternal Uncle      brain     Review of Systems     Constitutional:  Positive for fever, chills, fatigue and increased energy. Negative for weight loss, weight gain, decreased appetite, night sweats, recent stressors, height loss, post-operative complications, incisional pain,  hallucinations, hyperactivity and confused.   HENT:  Negative for ear pain, hearing loss, tinnitus, nosebleeds, trouble swallowing, hoarse voice, mouth sores, sore throat, ear discharge, tooth pain, gum tenderness, taste disturbance, smell disturbance, hearing aid, bleeding gums, dry mouth, sinus pain, sinus congestion and neck mass.    Eyes:  Negative for double vision, pain, redness, eye pain, decreased vision, eye watering, eye bulging, eye dryness, flashing lights, spots, floaters, strabismus, tunnel vision, jaundice and eye irritation.   Respiratory:   Positive for cough and sputum production. Negative for hemoptysis, shortness of breath, wheezing, sleep disturbances due to breathing, snores loudly, dyspnea on exertion, cough disturbing sleep and postural dyspnea.    Cardiovascular:  Negative for chest pain, dyspnea on exertion, palpitations, orthopnea, claudication, leg swelling, fingers/toes turn blue, hypertension, hypotension, syncope, history of heart murmur, chest pain on exertion, chest pain at rest, pacemaker, few scattered varicosities, leg pain, sleep disturbances due to breathing, tachycardia, light-headedness, exercise intolerance and edema.   Gastrointestinal:  Negative for heartburn, nausea, vomiting, abdominal pain, diarrhea, constipation, blood in stool, melena, rectal pain, bloating, hemorrhoids, bowel incontinence, jaundice, rectal bleeding, coffee ground emesis and change in stool.   Genitourinary:  Positive for bladder incontinence, urgency, flank pain, decreased libido, abnormal vaginal bleeding, excessive menstruation and menstrual changes. Negative for dysuria, hematuria, vaginal discharge, difficulty urinating, genital sores, dyspareunia, nocturia, voiding less frequently, arousal difficulty, hot flashes, vaginal dryness and postmenopausal bleeding.   Musculoskeletal:  Positive for myalgias, back pain, joint swelling, arthralgias, stiffness, muscle cramps and muscle weakness. Negative  "for neck pain, bone pain and fracture.   Skin:  Positive for rash and flaky skin. Negative for nail changes, itching, poor wound healing, hair changes, skin changes, acne, warts, poor wound healing, scarring, Raynaud's phenomenon, sensitivity to sunlight and skin thickening.   Neurological:  Negative for dizziness, tingling, tremors, speech change, seizures, loss of consciousness, weakness, light-headedness, numbness, headaches, disturbances in coordination, extremity numbness, memory loss, difficulty walking and paralysis.   Endo/Heme:  Negative for anemia, swollen glands and bruises/bleeds easily.   Psychiatric/Behavioral:  Negative for depression, hallucinations, memory loss, decreased concentration, mood swings and panic attacks.    Breast:  Negative for breast discharge, breast mass, breast pain and nipple retraction.   Endocrine:  Positive for altered temperature regulation.Negative for polyphagia, polydipsia, unwanted hair growth and change in facial hair.    Allergies   Allergen Reactions     Shellfish-Derived Products Hives     Amoxicillin      Erythromycin      No Clinical Screening - See Comments Nausea and Vomiting     Eggplant     Scopolamine      Redness and swelling of the face     Current Outpatient Prescriptions   Medication     Cholecalciferol (VITAMIN D PO)     fluticasone (FLONASE) 50 MCG/ACT nasal spray     Multiple Vitamin (DAILY MULTIVITAMIN PO)     Omega-3 Fatty Acids (FISH OIL PO)     triamcinolone (KENALOG) 0.1 % cream     No current facility-administered medications for this visit.      /71  Pulse 69  Ht 1.695 m (5' 6.73\")  Wt 83.5 kg (184 lb)  BMI 29.05 kg/m2 No LMP recorded. Body mass index is 29.05 kg/(m^2).  She is alert and oriented.  She is well groomed, comfortable in no acute distress. Normal mood and affect.   Non-labored breathing.      Urine dip negative    PVR 0 mL by bladder scan    A/P: Janet Ryder is a 38 year old F with hip dysplasia and urinary urgency " frequency, dyspareunia, bladder pain.    -We discussed that her symptoms are most likely related to generalized pelvic floor dysfunction given her significant history of hip dysplasia and pelvic floor instability, but she is also interested in cystoscopy to rule out any intravesical pathology as the cause of her symptoms.  We will schedule her for next available cystoscopy with pelvic exam at that time.    -We also discussed a trial of anticholinergic medication for her urgency, but she is not interested in any new medications.    Kyle Navas MD PGY4    Addendum:    The patient was seen and evaluated with the resident.  The plan was formulated in conjunction with me and I agree with the above note with changes made as necessary.  As we happen to have a cystoscopy opening next week will have her return next week    20 minutes were spent with the patient today, > 50% in counseling and coordination of care    Emily Snell MD MPH   of Urology    CC  Patient Care Team:  Melinda Lewis APRN CNP as PCP - Emily Mejia MD as MD (Urology)  Abbey Watt, RN as Registered Nurse (Urology)  ELKE RAMÍREZ

## 2018-06-21 NOTE — PROGRESS NOTES
June 21, 2018    Referring Provider: Leatha Robles MD  [address] [address]    Primary Care Provider: Melinda Lewis    CC: ***    HPI:  Janet Ryder is a 38 year old female who presents for evaluation of her pelvic floor symptoms.  She has ***.  with ***    Health maintenance screening:  Pap smear ***  Colonoscopy ***  Mammogram ***    Past Medical History:   Diagnosis Date     Atypical migraine        Past Surgical History:   Procedure Laterality Date     C NONSPECIFIC PROCEDURE  AGE 6, AGE 10    REMOVAL OF FACIAL SKIN CYSTS     C NONSPECIFIC PROCEDURE  AGE 22    SUTURES FOR FACIAL LACERATION     ORTHOPEDIC SURGERY         Social History     Social History     Marital status: Single     Spouse name: N/A     Number of children: N/A     Years of education: N/A     Occupational History      None           Social History Main Topics     Smoking status: Never Smoker     Smokeless tobacco: Never Used     Alcohol use Yes      Comment: 3 or 4 drinks per week     Drug use: No     Sexual activity: Yes     Partners: Male     Birth control/ protection: None     Other Topics Concern     Parent/Sibling W/ Cabg, Mi Or Angioplasty Before 65f 55m? No     Social History Narrative    Caffeine intake/servings daily - 1-2    Calcium intake/servings daily - 2+    Exercise 3 times weekly - describe swim, wts, basketball    Sunscreen used - Yes    Seatbelts used - Yes    Guns stored in the home - No    Self Breast Exam - No    Pap test up to date -  Yes    Eye exam up to date -  Yes    Dental exam up to date -  Yes    DEXA scan up to date -  Not Applicable    Flex Sig/Colonoscopy up to date -  Not Applicable    Mammography up to date -  Not Applicable    Immunizations reviewed and up to date - Yes    Abuse: Current or Past (Physical, Sexual or Emotional) - No    Do you feel safe in your environment - Yes    Do you cope well with stress - No    Do you suffer from insomnia - Yes     "Last updated by: CARLOS CROSS  5/11/2011                           Family History   Problem Relation Age of Onset     Hypertension Mother      Lung Cancer Mother      Ovarian Cancer Mother      Alcohol/Drug Father      GASTROINTESTINAL DISEASE Maternal Grandmother      cerosis of liver     Cancer - colorectal Maternal Grandfather      age 75     Cancer Maternal Grandfather      melanoma     Respiratory Maternal Grandfather      Glaucoma Maternal Grandfather      Cerebrovascular Disease Paternal Grandfather      Aneurysm Maternal Uncle      brain       ROS    Allergies   Allergen Reactions     Shellfish-Derived Products Hives     Amoxicillin      Erythromycin      No Clinical Screening - See Comments Nausea and Vomiting     Eggplant     Scopolamine      Redness and swelling of the face       Current Outpatient Prescriptions   Medication     Cholecalciferol (VITAMIN D PO)     fluticasone (FLONASE) 50 MCG/ACT nasal spray     Multiple Vitamin (DAILY MULTIVITAMIN PO)     Omega-3 Fatty Acids (FISH OIL PO)     triamcinolone (KENALOG) 0.1 % cream     No current facility-administered medications for this visit.        /71  Pulse 69  Ht 1.695 m (5' 6.73\")  Wt 83.5 kg (184 lb)  BMI 29.05 kg/m2 No LMP recorded. Body mass index is 29.05 kg/(m^2).  She is alert and oriented.  She is well groomed, comfortable in no acute distress. Normal mood and affect.   Non-labored breathing. Normocephalic without masses, lesions, obvious abnormalities. Abdomen is soft, non-tender, non-distended, no CVAT.  Normal external female genitalia.  *** ESST.  Speculum and bimanual exam are remarkable for ***.  She has *** support on supine strain.  ***/5 kegels.  Skin dry, warm to touch. No lower extremity edema.  Full ROM in extremities with *** gait.      Urine dip ***    PVR *** mL by bladder scan    A/P: Janet Ryder is a 38 year old F with ***    ***    *** minutes were spent with the patient today, > 50% in counseling and " coordination of care    Emily Snell MD MPH    Urology    CC  Patient Care Team:  Melinda Lewis, KOSTAS CNP as PCP - General  Alis, Emily Mccain MD as MD (Urology)  Abbey Watt, RN as Registered Nurse (Urology)  ELKE RAMÍREZ

## 2018-06-21 NOTE — MR AVS SNAPSHOT
After Visit Summary   6/21/2018    Janet Ryder    MRN: [id number]           Patient Information     Date Of Birth          1980        Visit Information        Provider Department      6/21/2018 9:15 AM Emily Snell MD [facility] and [facility] for Prostate and Urologic Cancers        Today's Diagnoses     Bladder pain    -  1    Urgency-frequency syndrome        Dyspareunia in female        Osteoarthritis resulting from right hip dysplasia          Care Instructions    Websites with free information:    American Urogynecologic Society patient website: www.voicesforpfd.org    Total Control Program: www.totalcontrolprogram.com    Please return for a cystoscopy (procedure to look in the bladder) and pelvic exam    It was a pleasure meeting with you today.  Thank you for allowing me and my team the privilege of caring for you today.  YOU are the reason we are here, and I truly hope we provided you with the excellent service you deserve.  Please let us know if there is anything else we can do for you so that we can be sure you are leaving completely satisfied with your care experience.    Cystoscopy    Cystoscopy is a procedure that lets your doctor look directly inside your urethra and bladder. It can be used to:    Help diagnose a problem with your urethra, bladder, or kidneys.    Take a sample (biopsy) of bladder or urethral tissue.    Treat certain problems (such as removing kidney stones).    Place a stent to bypass an obstruction.    Take special X-rays of the kidneys.  Based on the findings, your doctor may recommend other tests or treatments.  What is a cystoscope?  A cystoscope is a telescope-like instrument that contains lenses and fiberoptics (small glass wires that make bright light). The cystoscope may be straight and rigid, or flexible to bend around curves in the urethra. The doctor may look directly into the cystoscope, or project the image onto a monitor.  Getting  ready    Ask your doctor if you should stop taking any medicines before the procedure.    Follow any other instructions your doctor gives you.  Tell your doctor before the exam if you:    Take any medicines, such as aspirin or blood thinners    Have allergies to any medicines    Are pregnant   The procedure  Cystoscopy is done in the doctor s office, surgery center, or hospital. The doctor and a nurse are present during the procedure. It takes only a few minutes, longer if a biopsy, X-ray, or treatment needs to be done.  During the procedure:    You lie on an exam table on your back, knees bent and legs apart. You are covered with a drape.    Your urethra and the area around it are washed. Anesthetic jelly may be applied to numb the urethra.     The cystoscope is inserted. A sterile fluid is put into the bladder to expand it. You may feel pressure from this fluid.    When the procedure is done, the cystoscope is removed.  After the procedure  If you had a sedative, general anesthesia, or spinal anesthesia, you must have someone drive you home. Once you re home:    Drink plenty of fluids.    You may have burning or light bleeding when you urinate--this is normal.    Medicines may be prescribed to ease any discomfort or prevent infection. Take these as directed.    Call your doctor if you have heavy bleeding or blood clots, burning that lasts more than a day, a fever over 100 F  (38  C), or trouble urinating.  Date Last Reviewed: 1/1/2017 2000-2017 [employer]. [address] [place] [address]. All rights reserved. This information is not intended as a substitute for professional medical care. Always follow your healthcare professional's instructions.            Follow-ups after your visit        Your next 10 appointments already scheduled     Jun 28, 2018  7:30 AM CDT   (Arrive by 7:15 AM)   Cystoscopy with Emily Snell MD   [facility]rology and [facility] for Prostate and Urologic  "Cancers ([facility])    [address]  4th Floor  [facility] [address] [phone]            Jun 28, 2018  3:20 PM CDT   DANIELLE Extremity with Ceferino Chan PT   [facility] ([facility]  )    [address] [place] [address] [phone]            Jul 12, 2018  3:20 PM CDT   DANIELLE Extremity with Ceferino Chan PT   [facility] ([facility]  )    [address] [place] [address] [phone]              Who to contact     Please call your clinic at [phone] to:    Ask questions about your health    Make or cancel appointments    Discuss your medicines    Learn about your test results    Speak to your doctor            Additional Information About Your Visit        [employer] Information     [employer] gives you secure access to your electronic health record. If you see a primary care provider, you can also send messages to your care team and make appointments. If you have questions, please call your primary care clinic.  If you do not have a primary care provider, please call [phone] and they will assist you.      [employer] is an electronic gateway that provides easy, online access to your medical records. With [employer], you can request a clinic appointment, read your test results, renew a prescription or communicate with your care team.     To access your existing account, please contact your [facility] or call [phone] for assistance.        Care EveryWhere ID     This is your Care EveryWhere ID. This could be used by other organizations to access your [place] medical records  [id number]        Your Vitals Were     Pulse Height BMI (Body Mass Index)             69 1.695 m (5' 6.73\") 29.05 kg/m2          Blood Pressure from Last 3 Encounters:   06/21/18 110/71   06/01/18 102/70   05/14/18 114/79    " Weight from Last 3 Encounters:   06/21/18 83.5 kg (184 lb)   06/01/18 83 kg (183 lb)   05/14/18 83.2 kg (183 lb 6.4 oz)              We Performed the Following     POST-VOID RESIDUAL BLADDER SCAN     Urinalysis chemical screen POCT        Primary Care Provider Office Phone # Fax #    KOSTAS Bailon [phone] [address] [place] [address]        Equal Access to Services     JOE FORREST : Hadii aad ku hadasho Soomaali, waaxda luqadaha, qaybta kaalmada adeegyada, waxay idiin hayaan adearoldo kharash lana . So [facility] [phone].    ATENCIÓN: Si habla español, tiene a hernandez disposición servicios gratuitos de asistencia lingüística. Llame al [phone].    We comply with applicable federal civil rights laws and [place] laws. We do not discriminate on the basis of race, color, national origin, age, disability, sex, sexual orientation, or gender identity.            Thank you!     Thank you for choosing [facility] UROLOGY AND [facility] FOR PROSTATE AND UROLOGIC CANCERS  for your care. Our goal is always to provide you with excellent care. Hearing back from our patients is one way we can continue to improve our services. Please take a few minutes to complete the written survey that you may receive in the mail after your visit with us. Thank you!             Your Updated Medication List - Protect others around you: Learn how to safely use, store and throw away your medicines at www.disposemymeds.org.          This list is accurate as of 6/21/18 10:04 AM.  Always use your most recent med list.                   Brand Name Dispense Instructions for use Diagnosis    DAILY MULTIVITAMIN PO      Take  by mouth.        FISH OIL PO      Take  by mouth.        fluticasone 50 MCG/ACT spray    FLONASE    3 Package    Spray 1 spray into both nostrils daily.    Seasonal allergies       triamcinolone 0.1 % cream    KENALOG     CHANELLE EXT AA BID        VITAMIN D PO      Take 5,000 Units by  mouth.

## 2018-06-21 NOTE — LETTER
6/21/2018       RE: Janet Ryder  [address]  [facility] [address]     Dear Colleague,    Thank you for referring your patient, Janet Ryder, to the [facility] UROLOGY AND INST FOR PROSTATE AND UROLOGIC CANCERS at [facility]. Please see a copy of my visit note below.    June 21, 2018    Referring Provider: Leatha Robles MD  [address]  [place] [address]    Primary Care Provider: Melinda Lewis    CC: Bladder pain    HPI:  Janet Ryder is a 38 year old female who presents for evaluation of her pelvic floor symptoms.  She has a long-standing history of chronic pain including lumbosacral pain related to hip dysplasia for which she is currently undergoing physical therapy.  She reports that she has had bladder pain since at least 2015.  She has a constant mild burning in the area of her bladder, although most days she is able to ignore this and proceed with her daily activities.  Approximately once a week, she has a flare of pain that is severe enough that she must take pain medication and has limitations in her activities.  She endorses a several year history of urinary urgency with occasional small volume urge incontinence.  She denies frequency, stating that she voids roughly every 3-4 hours and none at night.  She denies hematuria or dysuria.  She denies urinary tract infections.  She has never seen a urologist before and has never had cystoscopy.  She is  and sexually active and endorses mild dyspareunia which has been long-standing.  She has never been pregnant.  She does have constipation at baseline with a bowel movement every other day.  She occasionally takes MiraLAX for this.  She does regularly see a gynecologist and had her last annual exam earlier this year.      Past Medical History:   Diagnosis Date     Atypical migraine        Past Surgical History:   Procedure Laterality Date     C NONSPECIFIC PROCEDURE  AGE 6,  AGE 10    REMOVAL OF FACIAL SKIN CYSTS     C NONSPECIFIC PROCEDURE  AGE 22    SUTURES FOR FACIAL LACERATION     ORTHOPEDIC SURGERY     No history of pelvic surgeries.     Social History     Social History     Marital status: Single     Spouse name: N/A     Number of children: N/A     Years of education: N/A     Occupational History      None           Social History Main Topics     Smoking status: Never Smoker     Smokeless tobacco: Never Used     Alcohol use Yes      Comment: 3 or 4 drinks per week     Drug use: No     Sexual activity: Yes     Partners: Male     Birth control/ protection: None     Other Topics Concern     Parent/Sibling W/ Cabg, Mi Or Angioplasty Before 65f 55m? No     Social History Narrative    Caffeine intake/servings daily - 1-2    Calcium intake/servings daily - 2+    Exercise 3 times weekly - describe swim, wts, basketball    Sunscreen used - Yes    Seatbelts used - Yes    Guns stored in the home - No    Self Breast Exam - No    Pap test up to date -  Yes    Eye exam up to date -  Yes    Dental exam up to date -  Yes    DEXA scan up to date -  Not Applicable    Flex Sig/Colonoscopy up to date -  Not Applicable    Mammography up to date -  Not Applicable    Immunizations reviewed and up to date - Yes    Abuse: Current or Past (Physical, Sexual or Emotional) - No    Do you feel safe in your environment - Yes    Do you cope well with stress - No    Do you suffer from insomnia - Yes    Last updated by: CARLOS CROSS  5/11/2011                         Family History   Problem Relation Age of Onset     Hypertension Mother      Lung Cancer Mother      Ovarian Cancer Mother      Alcohol/Drug Father      GASTROINTESTINAL DISEASE Maternal Grandmother      cerosis of liver     Cancer - colorectal Maternal Grandfather      age 75     Cancer Maternal Grandfather      melanoma     Respiratory Maternal Grandfather      Glaucoma Maternal Grandfather      Cerebrovascular Disease Paternal  Grandfather      Aneurysm Maternal Uncle      brain     Review of Systems   Constitutional:  Positive for fever, chills, fatigue and increased energy. Negative for weight loss, weight gain, decreased appetite, night sweats, recent stressors, height loss, post-operative complications, incisional pain, hallucinations, hyperactivity and confused.   HENT:  Negative for ear pain, hearing loss, tinnitus, nosebleeds, trouble swallowing, hoarse voice, mouth sores, sore throat, ear discharge, tooth pain, gum tenderness, taste disturbance, smell disturbance, hearing aid, bleeding gums, dry mouth, sinus pain, sinus congestion and neck mass.    Eyes:  Negative for double vision, pain, redness, eye pain, decreased vision, eye watering, eye bulging, eye dryness, flashing lights, spots, floaters, strabismus, tunnel vision, jaundice and eye irritation.   Respiratory:   Positive for cough and sputum production. Negative for hemoptysis, shortness of breath, wheezing, sleep disturbances due to breathing, snores loudly, dyspnea on exertion, cough disturbing sleep and postural dyspnea.    Cardiovascular:  Negative for chest pain, dyspnea on exertion, palpitations, orthopnea, claudication, leg swelling, fingers/toes turn blue, hypertension, hypotension, syncope, history of heart murmur, chest pain on exertion, chest pain at rest, pacemaker, few scattered varicosities, leg pain, sleep disturbances due to breathing, tachycardia, light-headedness, exercise intolerance and edema.   Gastrointestinal:  Negative for heartburn, nausea, vomiting, abdominal pain, diarrhea, constipation, blood in stool, melena, rectal pain, bloating, hemorrhoids, bowel incontinence, jaundice, rectal bleeding, coffee ground emesis and change in stool.   Genitourinary:  Positive for bladder incontinence, urgency, flank pain, decreased libido, abnormal vaginal bleeding, excessive menstruation and menstrual changes. Negative for dysuria, hematuria, vaginal discharge,  "difficulty urinating, genital sores, dyspareunia, nocturia, voiding less frequently, arousal difficulty, hot flashes, vaginal dryness and postmenopausal bleeding.   Musculoskeletal:  Positive for myalgias, back pain, joint swelling, arthralgias, stiffness, muscle cramps and muscle weakness. Negative for neck pain, bone pain and fracture.   Skin:  Positive for rash and flaky skin. Negative for nail changes, itching, poor wound healing, hair changes, skin changes, acne, warts, poor wound healing, scarring, Raynaud's phenomenon, sensitivity to sunlight and skin thickening.   Neurological:  Negative for dizziness, tingling, tremors, speech change, seizures, loss of consciousness, weakness, light-headedness, numbness, headaches, disturbances in coordination, extremity numbness, memory loss, difficulty walking and paralysis.   Endo/Heme:  Negative for anemia, swollen glands and bruises/bleeds easily.   Psychiatric/Behavioral:  Negative for depression, hallucinations, memory loss, decreased concentration, mood swings and panic attacks.    Breast:  Negative for breast discharge, breast mass, breast pain and nipple retraction.   Endocrine:  Positive for altered temperature regulation.Negative for polyphagia, polydipsia, unwanted hair growth and change in facial hair.    Allergies   Allergen Reactions     Shellfish-Derived Products Hives     Amoxicillin      Erythromycin      No Clinical Screening - See Comments Nausea and Vomiting     Eggplant     Scopolamine      Redness and swelling of the face     Current Outpatient Prescriptions   Medication     Cholecalciferol (VITAMIN D PO)     fluticasone (FLONASE) 50 MCG/ACT nasal spray     Multiple Vitamin (DAILY MULTIVITAMIN PO)     Omega-3 Fatty Acids (FISH OIL PO)     triamcinolone (KENALOG) 0.1 % cream     No current facility-administered medications for this visit.      /71  Pulse 69  Ht 1.695 m (5' 6.73\")  Wt 83.5 kg (184 lb)  BMI 29.05 kg/m2 No LMP recorded. Body " mass index is 29.05 kg/(m^2).  She is alert and oriented.  She is well groomed, comfortable in no acute distress. Normal mood and affect.   Non-labored breathing.      Urine dip negative    PVR 0 mL by bladder scan    A/P: Janet Ryder is a 38 year old F with hip dysplasia and urinary urgency frequency, dyspareunia, bladder pain.    -We discussed that her symptoms are most likely related to generalized pelvic floor dysfunction given her significant history of hip dysplasia and pelvic floor instability, but she is also interested in cystoscopy to rule out any intravesical pathology as the cause of her symptoms.  We will schedule her for next available cystoscopy with pelvic exam at that time.    -We also discussed a trial of anticholinergic medication for her urgency, but she is not interested in any new medications.    Kyle Navas MD PGY4    Addendum:    The patient was seen and evaluated with the resident.  The plan was formulated in conjunction with me and I agree with the above note with changes made as necessary.  As we happen to have a cystoscopy opening next week will have her return next week    20 minutes were spent with the patient today, > 50% in counseling and coordination of care    CC  Patient Care Team:  Melinda Lewis APRN CNP as PCP - General  Abbey Watt, RN as Registered Nurse (Urology)  ELKE RAMÍREZ    Sincerely,  Emily Snell MD

## 2018-06-21 NOTE — PATIENT INSTRUCTIONS
Websites with free information:    American Urogynecologic Society patient website: www.voicesforpfd.org    Total Control Program: www.totalcontrolprogram.com    Please return for a cystoscopy (procedure to look in the bladder) and pelvic exam    It was a pleasure meeting with you today.  Thank you for allowing me and my team the privilege of caring for you today.  YOU are the reason we are here, and I truly hope we provided you with the excellent service you deserve.  Please let us know if there is anything else we can do for you so that we can be sure you are leaving completely satisfied with your care experience.    Cystoscopy    Cystoscopy is a procedure that lets your doctor look directly inside your urethra and bladder. It can be used to:    Help diagnose a problem with your urethra, bladder, or kidneys.    Take a sample (biopsy) of bladder or urethral tissue.    Treat certain problems (such as removing kidney stones).    Place a stent to bypass an obstruction.    Take special X-rays of the kidneys.  Based on the findings, your doctor may recommend other tests or treatments.  What is a cystoscope?  A cystoscope is a telescope-like instrument that contains lenses and fiberoptics (small glass wires that make bright light). The cystoscope may be straight and rigid, or flexible to bend around curves in the urethra. The doctor may look directly into the cystoscope, or project the image onto a monitor.  Getting ready    Ask your doctor if you should stop taking any medicines before the procedure.    Follow any other instructions your doctor gives you.  Tell your doctor before the exam if you:    Take any medicines, such as aspirin or blood thinners    Have allergies to any medicines    Are pregnant   The procedure  Cystoscopy is done in the doctor s office, surgery center, or hospital. The doctor and a nurse are present during the procedure. It takes only a few minutes, longer if a biopsy, X-ray, or treatment needs  to be done.  During the procedure:    You lie on an exam table on your back, knees bent and legs apart. You are covered with a drape.    Your urethra and the area around it are washed. Anesthetic jelly may be applied to numb the urethra.     The cystoscope is inserted. A sterile fluid is put into the bladder to expand it. You may feel pressure from this fluid.    When the procedure is done, the cystoscope is removed.  After the procedure  If you had a sedative, general anesthesia, or spinal anesthesia, you must have someone drive you home. Once you re home:    Drink plenty of fluids.    You may have burning or light bleeding when you urinate--this is normal.    Medicines may be prescribed to ease any discomfort or prevent infection. Take these as directed.    Call your doctor if you have heavy bleeding or blood clots, burning that lasts more than a day, a fever over 100 F  (38  C), or trouble urinating.  Date Last Reviewed: 1/1/2017 2000-2017 [employer]. [address] [place] [address], [place] [address]. All rights reserved. This information is not intended as a substitute for professional medical care. Always follow your healthcare professional's instructions.

## 2018-06-21 NOTE — NURSING NOTE
"Chief Complaint   Patient presents with     Consult      IC consult        Blood pressure 110/71, pulse 69, height 1.695 m (5' 6.73\"), weight 83.5 kg (184 lb), not currently breastfeeding. Body mass index is 29.05 kg/(m^2).    Patient Active Problem List   Diagnosis     [facility] SPRAIN SACROILIAC     [place] SPR[place] THORACIC REGION     Lumbosacral ligament sprain     [facility] OTHER BACK SYMPTOMS     Contraception     CARDIOVASCULAR SCREENING; LDL GOAL LESS THAN 160     Abdominal pain     Seasonal allergies     Atypical migraine     Rosacea     Iron deficiency     Interstitial cystitis     Family history of malignant neoplasm of ovary     Acute pain of right knee       Allergies   Allergen Reactions     Shellfish-Derived Products Hives     Amoxicillin      Erythromycin      No Clinical Screening - See Comments Nausea and Vomiting     Eggplant     Scopolamine      Redness and swelling of the face       Current Outpatient Prescriptions   Medication Sig Dispense Refill     Cholecalciferol (VITAMIN D PO) Take 5,000 Units by mouth.       fluticasone (FLONASE) 50 MCG/ACT nasal spray Spray 1 spray into both nostrils daily. 3 Package 3     Multiple Vitamin (DAILY MULTIVITAMIN PO) Take  by mouth.       Omega-3 Fatty Acids (FISH OIL PO) Take  by mouth.       triamcinolone (KENALOG) 0.1 % cream CHANELLE EXT AA BID  2       Social History   Substance Use Topics     Smoking status: Never Smoker     Smokeless tobacco: Never Used     Alcohol use Yes      Comment: 3 or 4 drinks per week       PIETER Ann  6/21/2018  9:29 AM       "

## 2018-06-25 ENCOUNTER — PRE VISIT (OUTPATIENT)
Dept: UROLOGY | Facility: CLINIC | Age: 38
End: 2018-06-25

## 2018-06-25 NOTE — TELEPHONE ENCOUNTER
Reason for visit: cystoscopy    Relevant information: urinary frequency, urgency, dyspareunia, and bladder pain    Records/imaging/labs: All records available    Pt called: No need for a call    Rooming: collect a urine

## 2018-06-28 ENCOUNTER — OFFICE VISIT (OUTPATIENT)
Dept: UROLOGY | Facility: CLINIC | Age: 38
End: 2018-06-28
Payer: COMMERCIAL

## 2018-06-28 VITALS
WEIGHT: 181.3 LBS | BODY MASS INDEX: 28.46 KG/M2 | HEART RATE: 62 BPM | HEIGHT: 67 IN | DIASTOLIC BLOOD PRESSURE: 65 MMHG | SYSTOLIC BLOOD PRESSURE: 107 MMHG

## 2018-06-28 DIAGNOSIS — N94.10 DYSPAREUNIA IN FEMALE: ICD-10-CM

## 2018-06-28 DIAGNOSIS — M25.561 ACUTE PAIN OF RIGHT KNEE: ICD-10-CM

## 2018-06-28 DIAGNOSIS — M16.31 OSTEOARTHRITIS RESULTING FROM RIGHT HIP DYSPLASIA: ICD-10-CM

## 2018-06-28 DIAGNOSIS — R39.89 BLADDER PAIN: ICD-10-CM

## 2018-06-28 DIAGNOSIS — N32.9 HYPERVASCULAR LESION OF URINARY BLADDER: ICD-10-CM

## 2018-06-28 DIAGNOSIS — M62.89 PELVIC FLOOR DYSFUNCTION: ICD-10-CM

## 2018-06-28 DIAGNOSIS — M79.18 MYALGIA OF PELVIC FLOOR: ICD-10-CM

## 2018-06-28 DIAGNOSIS — N32.81 URGENCY-FREQUENCY SYNDROME: Primary | ICD-10-CM

## 2018-06-28 ASSESSMENT — PAIN SCALES - GENERAL
PAINLEVEL: NO PAIN (0)
PAINLEVEL: NO PAIN (0)

## 2018-06-28 NOTE — PROGRESS NOTES
"June 28, 2018    Return visit    Patient returns today for follow up.  She denies any changes in her health since last visit.    /65  Pulse 62  Ht 1.695 m (5' 6.73\")  Wt 82.2 kg (181 lb 4.8 oz)  BMI 28.63 kg/m2  She is comfortable, in no distress, non-labored breathing.  Abdomen is soft, non-tender, non-distended.  Normal external female genitalia.  Negative CST.  Pelvic exam is remarkable for myofascial tenderness of the pelvic floor and pelvic floor dysfunction.    Cystoscopy Note: After informed consent was obtained patient was prepped and draped in the standard fashion.  The flexible cystoscope was inserted into a normal appearing urethral meatus.  The urothelium was carefully examined and there was a some small hypervascular lesions noted on the posterior wall of the bladder but there were no tumors, masses, stones, foreign bodies, or other urothelial abnormalities noted.  Bilateral ureteral orifices were noted in the normal orthotopic position and both effluxed clear urine.  The cystoscope was retroflexed and the bladder neck was unremarkable.  The urethra was carefully examined upon removing the cystoscope and was unremarkable.  Patient tolerated the procedure without complications noted.      A/P: 38 year old F with history of hip dysplasia, right knee pain with dyspareunia, urgency frequency, bladder pain, myofascial tenderness of the pelvic floor and pelvic floor dysfunction    Urine cytology to ensure to occult malignancy    We discussed how her pelvic floor symptoms are related to the physical exam findings and her pelvic floor myofascial dysfunction.  We discussed how the recommended treatment is dedicated pelvic floor therapy.  We discussed how the pelvic floor physical therapy works and patient is agreeable.  Referral was placed.      RTC 4 months for possible repeat cystoscopy, sooner if needed    Emily Snell MD MPH   of Urology    CC  Patient Care Team:  Debbie, " Reema S, APRN CNP as PCP - General  Emily Snell MD as MD (Urology)  Abbey Watt, RN as Registered Nurse (Urology)  REEMA LÓPEZ

## 2018-06-28 NOTE — LETTER
"6/28/2018       RE: Janet Ryder  [address]  [facility] [address]     Dear Colleague,    Thank you for referring your patient, Janet Ryder, to the [facility] UROLOGY AND INST FOR PROSTATE AND UROLOGIC CANCERS at [facility]. Please see a copy of my visit note below.    June 28, 2018    Return visit    Patient returns today for follow up.  She denies any changes in her health since last visit.    /65  Pulse 62  Ht 1.695 m (5' 6.73\")  Wt 82.2 kg (181 lb 4.8 oz)  BMI 28.63 kg/m2  She is comfortable, in no distress, non-labored breathing.  Abdomen is soft, non-tender, non-distended.  Normal external female genitalia.  Negative CST.  Pelvic exam is remarkable for myofascial tenderness of the pelvic floor and pelvic floor dysfunction.    Cystoscopy Note: After informed consent was obtained patient was prepped and draped in the standard fashion.  The flexible cystoscope was inserted into a normal appearing urethral meatus.  The urothelium was carefully examined and there was a some small hypervascular lesions noted on the posterior wall of the bladder but there were no tumors, masses, stones, foreign bodies, or other urothelial abnormalities noted.  Bilateral ureteral orifices were noted in the normal orthotopic position and both effluxed clear urine.  The cystoscope was retroflexed and the bladder neck was unremarkable.  The urethra was carefully examined upon removing the cystoscope and was unremarkable.  Patient tolerated the procedure without complications noted.      A/P: 38 year old F with history of hip dysplasia, right knee pain with dyspareunia, urgency frequency, bladder pain, myofascial tenderness of the pelvic floor and pelvic floor dysfunction    Urine cytology to ensure to occult malignancy    We discussed how her pelvic floor symptoms are related to the physical exam findings and her pelvic floor myofascial dysfunction.  We discussed how the " recommended treatment is dedicated pelvic floor therapy.  We discussed how the pelvic floor physical therapy works and patient is agreeable.  Referral was placed.      RTC 4 months for possible repeat cystoscopy, sooner if needed    Emily Snell MD MPH   of Urology    CC  Patient Care Team:  Melinda Lewis APRN CNP as PCP - Abbey Medrano, RN as Registered Nurse (Urology)

## 2018-06-28 NOTE — NURSING NOTE
"Chief Complaint   Patient presents with     Cystoscopy     urinary frequency, urgency, dyspareunia, and bladder pain       Blood pressure 107/65, pulse 62, height 1.695 m (5' 6.73\"), weight 82.2 kg (181 lb 4.8 oz), not currently breastfeeding. Body mass index is 28.63 kg/(m^2).    Patient Active Problem List   Diagnosis     ia SPRAIN SACROILIAC     ia SPRAIN THORACIC REGION     Lumbosacral ligament sprain     [facility] OTHER BACK SYMPTOMS     Contraception     CARDIOVASCULAR SCREENING; LDL GOAL LESS THAN 160     Abdominal pain     Seasonal allergies     Atypical migraine     Rosacea     Iron deficiency     Interstitial cystitis     Family history of malignant neoplasm of ovary     Acute pain of right knee     Bladder pain     Urgency-frequency syndrome     Dyspareunia in female     Osteoarthritis resulting from right hip dysplasia       Allergies   Allergen Reactions     Shellfish-Derived Products Hives     Amoxicillin      Erythromycin      No Clinical Screening - See Comments Nausea and Vomiting     Eggplant     Scopolamine      Redness and swelling of the face       Current Outpatient Prescriptions   Medication Sig Dispense Refill     Cholecalciferol (VITAMIN D PO) Take 5,000 Units by mouth.       fluticasone (FLONASE) 50 MCG/ACT nasal spray Spray 1 spray into both nostrils daily. 3 Package 3     Multiple Vitamin (DAILY MULTIVITAMIN PO) Take  by mouth.       Omega-3 Fatty Acids (FISH OIL PO) Take  by mouth.       triamcinolone (KENALOG) 0.1 % cream CHANELLE EXT AA BID  2       Social History   Substance Use Topics     Smoking status: Never Smoker     Smokeless tobacco: Never Used     Alcohol use Yes      Comment: 3 or 4 drinks per week       PIETER Ann  2018  7:31 AM         Invasive Procedure Safety Checklist:    Procedure:     Action: Complete sections and checkboxes as appropriate.    Pre-procedure:  1. Patient ID Verified with 2 identifiers (Nida and  or MRN) : YES    2. Procedure and site " verified with patient/designee (when able) : YES    3. Accurate consent documentation in medical record : YES    4. H&P (or appropriate assessment) documented in medical record : YES  H&P must be up to 30 days prior to procedure an updated within 24 hours of                 Procedure as applicable.     5. Relevant diagnostic and radiology test results appropriately labeled and displayed as applicable : YES    6. Blood products, implants, devices, and/or special equipment available for the procedure as applicable : YES    7. Procedure site(s) marked with provider initials [Exclusions: None] : NO    8. Marking not required. Reason : Yes  Procedure does not require site marking    Time Out:     Time-Out performed immediately prior to starting procedure, including verbal and active participation of all team members addressing: YES    1. Correct patient identity.  2. Confirmed that the correct side and site are marked.  3. An accurate procedure to be done.  4. Agreement on the procedure to be done.  5. Correct patient position.  6. Relevant images and results are properly labeled and appropriately displayed.  7. The need to administer antibiotics or fluids for irrigation purposes during the procedure as applicable.  8. Safety precautions based on patient history or medication use.    During Procedure: Verification of correct person, site, and procedure occurs any time the responsibility for care of the patient is transferred to another member of the care team.      The following medication was given:     MEDICATION:  Lidocaine 2% jelly  ROUTE: topical  SITE: urethral via the meatus  DOSE: 10 mL  LOT #: [id number]  : [employer]  EXPIRATION DATE: 02/20  NDC#: [address]-8   Was there drug waste? No      Soni Stark CMA  June 28, 2018

## 2018-06-28 NOTE — MR AVS SNAPSHOT
"              After Visit Summary   6/28/2018    Janet Ryder    MRN: [id number]           Patient Information     Date Of Birth          1980        Visit Information        Provider Department      6/28/2018 7:30 AM Emily Snell MD [facility] Urology and [facility] for Prostate and Urologic Cancers        Today's Diagnoses     Urgency-frequency syndrome    -  1    Bladder pain        Hypervascular lesion of urinary bladder        Dyspareunia in female        Osteoarthritis resulting from right hip dysplasia        Acute pain of right knee        Myalgia of pelvic floor        Pelvic floor dysfunction          Care Instructions    Websites with free information:    American Urogynecologic Society patient website: www.voicesforpfd.org    Total Control Program: www.totalcontrolprogram.com    Please see one of the dedicated pelvic floor physical therapist ([facility] [phone])    Please return to see me in 4 months for possible cystoscopy, sooner if needed    It was a pleasure meeting with you today.  Thank you for allowing me and my team the privilege of caring for you today.  YOU are the reason we are here, and I truly hope we provided you with the excellent service you deserve.  Please let us know if there is anything else we can do for you so that we can be sure you are leaving completely satisfied with your care experience.      AFTER YOUR CYSTOSCOPY        You have just completed a cystoscopy, or \"cysto\", which allowed your physician to learn more about your bladder (or to remove a stent placed after surgery). We suggest that you continue to avoid caffeine, fruit juice, and alcohol for the next 24 hours, however, you are encouraged to return to your normal activities.         A few things that are considered normal after your cystoscopy:     * Small amount of bleeding (or spotting) that clears within the next 24 hours     * Slight burning sensation with " "urination     * Sensation to of needing to avoid more frequently     * The feeling of \"air\" in your urine     * Mild discomfort that is relieved with Tylenol        Please contact our office promptly if you:     * Develop a fever above 101 degrees     * Are unable to urinate     * Develop bright red blood that does not stop     * Severe pain or swelling         Please contact our office with any concerns or questions @[phone]          Follow-ups after your visit        Additional Services     [facility] Physical Therapy Referral       **This order will print in the [facility] Scheduling Office**    Physical Therapy, Hand Therapy and Chiropractic Care are available through:    *[facility]  *[facility]  *[facility]    Call one number to schedule at any of the above locations: [phone].    Your provider has referred you to: Physical Therapy at [facility] or [facility]    Indication/Reason for Referral: Women's Health (Please Complete Special Programs SmartList)  Onset of Illness: years  Therapy Orders: Evaluate and Treat  Special Programs: None and Women's Health: Pelvic Dysfunction: Dyspareunia, myofascial tenderness of the pelvic floor, pelvic floor dysfunction  Pelvic Floor Weakness: urinary urgency frequency and Biofeedback, E-Stim/TENS/TENS Rental if deemed appropriate by therapist, Exercise/HEP and Myofascial Release/Massage (internal)  Special Request: Exercise: Home Exercise Program  Manual Therapy: Myofascial Release/Massage (internal)  Modalities: As Indicated E-Stim/TENS    Maritza Salas      Additional Comments for the Therapist or Chiropractor: No sirisha please.  Core strengthening    Please be aware that coverage of these services is subject to the terms and limitations of your health insurance plan.  Call member services at your health plan with any benefit or coverage questions.      Please bring the following to your appointment:    *Your personal calendar for " "scheduling future appointments  *Comfortable clothing                  Your next 10 appointments already scheduled     Jul 12, 2018  3:20 PM CDT   DANIELLE Extremity with Ceferino Chan PT   [facility]    [address] [id number] [phone]              Who to contact     Please call your clinic at [phone] to:    Ask questions about your health    Make or cancel appointments    Discuss your medicines    Learn about your test results    Speak to your doctor            Additional Information About Your Visit        [email or web address] Information     [employer] gives you secure access to your electronic health record. If you see a primary care provider, you can also send messages to your care team and make appointments. If you have questions, please call your primary care clinic.  If you do not have a primary care provider, please call [phone] and they will assist you.      [employer] is an electronic gateway that provides easy, online access to your medical records. With [employer], you can request a clinic appointment, read your test results, renew a prescription or communicate with your care team.     To access your existing account, please contact your [facility] or call [phone] for assistance.        Care EveryWhere ID     This is your Care EveryWhere ID. This could be used by other organizations to access your [place] medical records  [id number]        Your Vitals Were     Pulse Height BMI (Body Mass Index)             62 1.695 m (5' 6.73\") 28.63 kg/m2          Blood Pressure from Last 3 Encounters:   06/28/18 107/65   06/21/18 110/71   06/01/18 102/70    Weight from Last 3 Encounters:   06/28/18 82.2 kg (181 lb 4.8 oz)   06/21/18 83.5 kg (184 lb)   06/01/18 83 kg (183 lb)              We Performed the Following     CYSTOURETHROSCOPY     Cytology non gyn     DANIELLE Physical Therapy Referral     "    Primary Care Provider Office Phone # Fax #    Melinda Lewis, KOSTAS IGNACIO [phone] [address] [place] [address]        Equal Access to Services     JOE FORREST : Hadii aad ku hadjoséo Sosherrellali, waaxda luqadaha, qaybta kaalmada adeegyada, waxthaddeus carreron reta daniela lana gracia. So [facility] [phone].    ATENCIÓN: Si habla español, tiene a hernandez disposición servicios gratuitos de asistencia lingüística. Llame al [phone].    We comply with applicable federal civil rights laws and [place] laws. We do not discriminate on the basis of race, color, national origin, age, disability, sex, sexual orientation, or gender identity.            Thank you!     Thank you for choosing [facility] UROLOGY AND [facility] FOR PROSTATE AND UROLOGIC CANCERS  for your care. Our goal is always to provide you with excellent care. Hearing back from our patients is one way we can continue to improve our services. Please take a few minutes to complete the written survey that you may receive in the mail after your visit with us. Thank you!             Your Updated Medication List - Protect others around you: Learn how to safely use, store and throw away your medicines at www.disposemymeds.org.          This list is accurate as of 6/28/18  8:17 AM.  Always use your most recent med list.                   Brand Name Dispense Instructions for use Diagnosis    DAILY MULTIVITAMIN PO      Take  by mouth.        FISH OIL PO      Take  by mouth.        fluticasone 50 MCG/ACT spray    FLONASE    3 Package    Spray 1 spray into both nostrils daily.    Seasonal allergies       triamcinolone 0.1 % cream    KENALOG     CHANELLE EXT AA BID        VITAMIN D PO      Take 5,000 Units by mouth.

## 2018-06-28 NOTE — PATIENT INSTRUCTIONS
"Websites with free information:    American Urogynecologic Society patient website: www.voicesforpfd.org    Total Control Program: www.totalcontrolprogram.com    Please see one of the dedicated pelvic floor physical therapist ([facility] [phone])    Please return to see me in 4 months for possible cystoscopy, sooner if needed    It was a pleasure meeting with you today.  Thank you for allowing me and my team the privilege of caring for you today.  YOU are the reason we are here, and I truly hope we provided you with the excellent service you deserve.  Please let us know if there is anything else we can do for you so that we can be sure you are leaving completely satisfied with your care experience.      AFTER YOUR CYSTOSCOPY        You have just completed a cystoscopy, or \"cysto\", which allowed your physician to learn more about your bladder (or to remove a stent placed after surgery). We suggest that you continue to avoid caffeine, fruit juice, and alcohol for the next 24 hours, however, you are encouraged to return to your normal activities.         A few things that are considered normal after your cystoscopy:     * Small amount of bleeding (or spotting) that clears within the next 24 hours     * Slight burning sensation with urination     * Sensation to of needing to avoid more frequently     * The feeling of \"air\" in your urine     * Mild discomfort that is relieved with Tylenol        Please contact our office promptly if you:     * Develop a fever above 101 degrees     * Are unable to urinate     * Develop bright red blood that does not stop     * Severe pain or swelling         Please contact our office with any concerns or questions @[phone]  "

## 2018-06-29 LAB — COPATH REPORT: NORMAL

## 2018-07-12 ENCOUNTER — THERAPY VISIT (OUTPATIENT)
Dept: PHYSICAL THERAPY | Facility: CLINIC | Age: 38
End: 2018-07-12
Payer: COMMERCIAL

## 2018-07-12 DIAGNOSIS — M25.561 ACUTE PAIN OF RIGHT KNEE: ICD-10-CM

## 2018-07-12 PROCEDURE — 97112 NEUROMUSCULAR REEDUCATION: CPT | Mod: GP | Performed by: PHYSICAL THERAPIST

## 2018-07-26 ENCOUNTER — THERAPY VISIT (OUTPATIENT)
Dept: PHYSICAL THERAPY | Facility: CLINIC | Age: 38
End: 2018-07-26
Payer: COMMERCIAL

## 2018-07-26 DIAGNOSIS — N94.10 FEMALE DYSPAREUNIA: ICD-10-CM

## 2018-07-26 DIAGNOSIS — M99.05 SOMATIC DYSFUNCTION OF PELVIC REGION: Primary | ICD-10-CM

## 2018-07-26 DIAGNOSIS — R39.89 BLADDER PAIN: ICD-10-CM

## 2018-07-26 DIAGNOSIS — N32.81 URGENCY-FREQUENCY SYNDROME: ICD-10-CM

## 2018-07-26 PROCEDURE — 97161 PT EVAL LOW COMPLEX 20 MIN: CPT | Mod: GP | Performed by: PHYSICAL THERAPIST

## 2018-07-26 PROCEDURE — 97530 THERAPEUTIC ACTIVITIES: CPT | Mod: GP | Performed by: PHYSICAL THERAPIST

## 2018-07-26 NOTE — MR AVS SNAPSHOT
After Visit Summary   7/26/2018    Janet Ryder    MRN: [id number]           Patient Information     Date Of Birth          1980        Visit Information        Provider Department      7/26/2018 2:40 PM Susana Ng PT [facility] Physical Therapy        Today's Diagnoses     Somatic dysfunction of pelvic region    -  1    Female dyspareunia        Urgency-frequency syndrome        Bladder pain           Follow-ups after your visit        Your next 10 appointments already scheduled     Aug 02, 2018  3:20 PM CDT   DANIELLE For Women Only with Susana Ng PT   [facility] Physical Therapy ([facility]  )    [address] [address]   [phone]            Aug 09, 2018  3:20 PM CDT   DANIELLE For Women Only with Susana Ng PT   [facility] Physical Therapy ([facility]  )    [address] [place] [address]   [phone]            Aug 16, 2018  8:10 AM CDT   DANIELLE For Women Only with Susana Ng PT   [facility] Therapy ([facility]  )    [address] [place] [address]   [phone]            Oct 04, 2018  8:45 AM CDT   (Arrive by 8:30 AM)   Cystoscopy with Emily Snell MD   [facility] and Inst for Prostate and Urologic Cancers ([facility])    [address] [place] [address]  [address] [facility] [address] [phone]              Who to contact     If you have questions or need follow up information about today's clinic visit or your schedule please contact [place] [facility] PHYSICAL THERAPY directly at [phone].  Normal or non-critical lab and imaging results will be communicated to you by MyChart, letter or phone within 4 business days after the clinic has received the results. If you do not hear from us within 7  days, please contact the clinic through [employer] or phone. If you have a critical or abnormal lab result, we will notify you by phone as soon as possible.  Submit refill requests through [employer] or call your pharmacy and they will forward the refill request to us. Please allow 3 business days for your refill to be completed.          Additional Information About Your Visit        [employer] Information     [employer] gives you secure access to your electronic health record. If you see a primary care provider, you can also send messages to your care team and make appointments. If you have questions, please call your primary care clinic.  If you do not have a primary care provider, please call [phone] and they will assist you.        Care EveryWhere ID     This is your Care EveryWhere ID. This could be used by other organizations to access your [place] medical records  [id number]         Blood Pressure from Last 3 Encounters:   06/28/18 107/65   06/21/18 110/71   06/01/18 102/70    Weight from Last 3 Encounters:   06/28/18 82.2 kg (181 lb 4.8 oz)   06/21/18 83.5 kg (184 lb)   06/01/18 83 kg (183 lb)              We Performed the Following     HC PT EVAL, LOW COMPLEXITY     DANIELLE INITIAL EVAL REPORT     THERAPEUTIC ACTIVITIES        Primary Care Provider Office Phone # Fax #    [facility] [phone]       [address] [address]        Equal Access to Services     JOE FORREST : Hadii wayne ku hadasho Sosherrellali, waaxda luqadaha, qaybta kaalmada emiliano max. So [facility] [phone].    ATENCIÓN: Si habla español, tiene a hernandez disposición servicios gratuitos de asistencia lingüística. Melodie al [phone].    We comply with applicable federal civil rights laws and [place] laws. We do not discriminate on the basis of race, color, national origin, age, disability, sex, sexual orientation, or gender identity.            Thank you!     Thank you for  [facility] PHYSICAL THERAPY  for your care. Our goal is always to provide you with excellent care. Hearing back from our patients is one way we can continue to improve our services. Please take a few minutes to complete the written survey that you may receive in the mail after your visit with us. Thank you!             Your Updated Medication List - Protect others around you: Learn how to safely use, store and throw away your medicines at www.disposemymeds.org.          This list is accurate as of 7/26/18 11:59 PM.  Always use your most recent med list.                   Brand Name Dispense Instructions for use Diagnosis    DAILY MULTIVITAMIN PO      Take  by mouth.        FISH OIL PO      Take  by mouth.        fluticasone 50 MCG/ACT spray    FLONASE    3 Package    Spray 1 spray into both nostrils daily.    Seasonal allergies       triamcinolone 0.1 % cream    KENALOG     CHANELLE EXT AA BID        VITAMIN D PO      Take 5,000 Units by mouth.

## 2018-07-26 NOTE — PROGRESS NOTES
"  HPI  System  Physical Exam  General   [facility]        Physical Therapy Initial Examination/Evaluation 2018   Janet Ryder is a 38 year old female referred to physical therapy by Dr. 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  No objective data collected this session as pt thought her appt was at wrong time and arrived 50 mins late to appt.   Education provided on pelvic PT and initiated education on proper voiding and elimination techniques.  Pt will benefit from skilled physical therapy for stretching and strengthening program as appropriate.     Subjective: Pt has had bladder/pelvic pain for 2-3 years. Pain will \"spike\" where she has to hunch over in pain. Increased pain periods are not consistent.   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 Yes    Do you leak with cough,sneeze, jumping, running?Yes   Any other activities that cause leaking? NA   Do you have triggers that make you feel you can't wait to go to the bathroom? No    Type of pad and number used per day? NA  When you leak what is the amount? Small     How long can you delay the need to urinate? 0-20 mins; sometimes need to go immediately with no warning  How many times do you get up to urinate at night? 0-1    Can you stop the flow of urine when on the toilet? Yes  Is the volume of urine passed usually: medium. (8sec rule=  250ml with average bladder storing  400-600ml)    Do you strain to pass urine? Sometimes  Do you have a slow or hesitant urinary stream? Sometimes  Do you have difficulty initiating the urine stream? Yes  Is urination painful?  No    How many bladder infections have you had in last 12 months? 0    Fluid intake(one glass is 8oz or one cup) 5-7 glasses/day, 2 caffinated glasses/day  0-1 alcohol glasses/day.    Bowel habits:  Frequency of bowel movements? 1 times a day  Consistancy of stool? Varies  Do you ignore the urge to defecate? No  Do you strain to pass stool? Yes    Pelvic Pain:  Do you have any pelvic pain with intercourse, exams, use of tampons? Yes  Is initial penetration during intercourse painful? Yes  Is deeper penetration painful? No  Do you use lubricant?   Not asked      Given birth? No     Are you sexually active? \"Barely\"; pt reports this is due to other issues, not pain   Have you ever been worried for your physical safety? No  Any abdominal or pelvic surgeries?  No  Are you having any regular exercise? No due to pain in knee    Therapeutic Activity (15 min): Today's session consisted of education regarding pelvic floor muscle anatomy, normal bladder function, urge suppression techniques and/or relaxation techniques as indicated, and instruction in how to complete a bladder diary for assessment " next visit.  Pt was instructed in the pathoanatomy of the pelvic floor utilizing pelvic model.  We discussed what pelvic floor physical therapy is, components of exam, and typical patient progression.  Pt was told that she was in control of exam progression, and if at any time was uncomfortable and wished to discontinue we could.         Assessment/Plan:    Patient is a 38 year old female with pelvic complaints.    Patient has the following significant findings with corresponding treatment plan.                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    Comorbidity factors that impact the plan of care are:      None.     Medications impacting care: None.  2) Examination of Body Systems comprised of:   Body structures and functions that impact the plan of care:      Knee and Pelvis.   Activity limitations that impact the plan of care are:      Walking, Working, Frequency, [place], Stress incontinence, Urgency, Urge incontinence and Urinary incontinence.  3) Clinical presentation characteristics are:   Stable/Uncomplicated.  4) Decision-Making    Low complexity using standardized patient assessment instrument and/or measureable assessment of functional outcome.  Cumulative Therapy Evaluation is: Low complexity.    Previous and current functional limitations:  (See Goal Flow Sheet for this information)    Short term and Long term goals: (See Goal Flow Sheet for this information)     Communication ability:  Patient appears to be able to clearly communicate and understand verbal and written  communication and follow directions correctly.  Treatment Explanation - The following has been discussed with the patient:   RX ordered/plan of care  Anticipated outcomes  Possible risks and side effects  This patient would benefit from PT intervention to resume normal activities.   Rehab potential is good.    Frequency:  1 X week, once daily  Duration:  for 6 weeks  Discharge Plan:  Achieve all LTG.  Independent in home treatment program.  Reach maximal therapeutic benefit.    Please refer to the daily flowsheet for treatment today, total treatment time and time spent performing 1:1 timed codes.

## 2018-07-27 PROBLEM — N94.10 FEMALE DYSPAREUNIA: Status: ACTIVE | Noted: 2018-07-27

## 2018-07-27 PROBLEM — M99.05 SOMATIC DYSFUNCTION OF PELVIC REGION: Status: ACTIVE | Noted: 2018-07-27

## 2018-08-02 ENCOUNTER — THERAPY VISIT (OUTPATIENT)
Dept: PHYSICAL THERAPY | Facility: CLINIC | Age: 38
End: 2018-08-02
Payer: COMMERCIAL

## 2018-08-02 DIAGNOSIS — N94.10 FEMALE DYSPAREUNIA: Primary | ICD-10-CM

## 2018-08-02 DIAGNOSIS — M99.05 SOMATIC DYSFUNCTION OF PELVIC REGION: ICD-10-CM

## 2018-08-02 PROCEDURE — 97112 NEUROMUSCULAR REEDUCATION: CPT | Mod: GP | Performed by: PHYSICAL THERAPIST

## 2018-08-02 PROCEDURE — 97530 THERAPEUTIC ACTIVITIES: CPT | Mod: GP | Performed by: PHYSICAL THERAPIST

## 2018-08-02 NOTE — MR AVS SNAPSHOT
After Visit Summary   8/2/2018    Janet Ryder    MRN: [id number]           Patient Information     Date Of Birth          1980        Visit Information        Provider Department      8/2/2018 3:20 PM Susana Ng PT [facility] Physical Therapy        Today's Diagnoses     Female dyspareunia    -  1    Somatic dysfunction of pelvic region           Follow-ups after your visit        Your next 10 appointments already scheduled     Aug 09, 2018  3:20 PM CDT   DANIELLE For Women Only with Susana Ng PT   [facility] Physical Therapy ([facility]  )    [address] [place] [address]   [phone]            Aug 16, 2018  8:10 AM CDT   DANIELLE For Women Only with Susana Ng PT   [facility] Physical Therapy ([facility]  )    [address] [place] [address]   [phone]            Aug 30, 2018 10:50 AM CDT   DANIELLE For Women Only with Susana Ng PT   [facility] Physical Therapy ([facility]  )    [address] [place] [address]   [phone]            Oct 04, 2018  8:45 AM CDT   (Arrive by 8:30 AM)   Cystoscopy with Emily Snell MD   [facility]rologic C[facility] ([facility])    [address] [place] [address] [phone]              Who to contact     If you have questions or need follow up information about today's clinic visit or your schedule please contact [facility] PHYSICAL THERAPY directly at [phone].  Normal or non-critical lab and imaging results will be communicated to you by MyChart, letter or phone within 4 business days after the clinic has received the results. If you do not hear from us within 7 days, please contact the clinic through MyChart or phone.  If you have a critical or abnormal lab result, we will notify you by phone as soon as possible.  Submit refill requests through "[employer] or call your pharmacy and they will forward the refill request to us. Please allow 3 business days for your refill to be completed.          Additional Information About Your Visit        [facility] [employer] Information     "[employer] gives you secure access to your electronic health record. If you see a primary care provider, you can also send messages to your care team and make appointments. If you have questions, please call your primary care clinic.  If you do not have a primary care provider, please call [phone] and they will assist you.        Care EveryWhere ID     This is your Care EveryWhere ID. This could be used by other organizations to access your [place] medical records  [id number]         Blood Pressure from Last 3 Encounters:   06/28/18 107/65   06/21/18 110/71   06/01/18 102/70    Weight from Last 3 Encounters:   06/28/18 82.2 kg (181 lb 4.8 oz)   06/21/18 83.5 kg (184 lb)   06/01/18 83 kg (183 lb)              We Performed the Following     NEUROMUSCULAR RE-EDUCATION     THERAPEUTIC ACTIVITIES        Primary Care Provider Office Phone # Fax #    [facility] [phone]       [address] [facility] [address]        Equal Access to Services     JOE FORREST : Hadii wayne barcenaso Sokristine, waaxda luqadaha, qaybta kaalmada adeegyada, emiliano gracia. So [facility] [phone].    ATENCIÓN: Si habla español, tiene a hernandez disposición servicios gratuitos de asistencia lingüística. Llame al [phone].    We comply with applicable federal civil rights laws and [place] laws. We do not discriminate on the basis of race, color, national origin, age, disability, sex, sexual orientation, or gender identity.            Thank you!     Thank you for choosing [facility] THER[facility]  for your care.  Our goal is always to provide you with excellent care. Hearing back from our patients is one way we can continue to improve our services. Please take a few minutes to complete the written survey that you may receive in the mail after your visit with us. Thank you!             Your Updated Medication List - Protect others around you: Learn how to safely use, store and throw away your medicines at www.disposemymeds.org.          This list is accurate as of 8/2/18 11:59 PM.  Always use your most recent med list.                   Brand Name Dispense Instructions for use Diagnosis    DAILY MULTIVITAMIN PO      Take  by mouth.        FISH OIL PO      Take  by mouth.        fluticasone 50 MCG/ACT spray    FLONASE    3 Package    Spray 1 spray into both nostrils daily.    Seasonal allergies       triamcinolone 0.1 % cream    KENALOG     CHANELLE EXT AA BID        VITAMIN D PO      Take 5,000 Units by mouth.

## 2018-08-02 NOTE — PROGRESS NOTES
Baseline PF tone:hyper  PF Tone with cough: hyper  PF Response quality: moderate  PF Power: Center: 2   Endurance: Maximum contraction in seconds: 8 decreased strength with increased time  # of endurance contractions before fatigue: NT  Quick contraction repetitions prior to fatigue: 9 with verbal and manual cues  Specificity/accessory muscles: Abdominals, glutes, neck     MMT 8/2/2018 Left Right    Hip Flexion  4/5 4/5   Hip Abduction  At least 3/5, difficulty getting into position  3+/5   Hip Extension  4/5 4/5           HPI  System  Physical Exam  General   ROS

## 2018-08-09 ENCOUNTER — THERAPY VISIT (OUTPATIENT)
Dept: PHYSICAL THERAPY | Facility: CLINIC | Age: 38
End: 2018-08-09
Payer: COMMERCIAL

## 2018-08-09 DIAGNOSIS — M99.05 SOMATIC DYSFUNCTION OF PELVIC REGION: ICD-10-CM

## 2018-08-09 DIAGNOSIS — N94.10 FEMALE DYSPAREUNIA: Primary | ICD-10-CM

## 2018-08-09 PROCEDURE — 97112 NEUROMUSCULAR REEDUCATION: CPT | Mod: GP | Performed by: PHYSICAL THERAPIST

## 2018-08-09 PROCEDURE — 97110 THERAPEUTIC EXERCISES: CPT | Mod: GP | Performed by: PHYSICAL THERAPIST

## 2018-08-22 ENCOUNTER — THERAPY VISIT (OUTPATIENT)
Dept: PHYSICAL THERAPY | Facility: CLINIC | Age: 38
End: 2018-08-22
Payer: COMMERCIAL

## 2018-08-22 DIAGNOSIS — N94.10 FEMALE DYSPAREUNIA: Primary | ICD-10-CM

## 2018-08-22 DIAGNOSIS — M99.05 SOMATIC DYSFUNCTION OF PELVIC REGION: ICD-10-CM

## 2018-08-22 PROCEDURE — 97140 MANUAL THERAPY 1/> REGIONS: CPT | Mod: GP | Performed by: PHYSICAL THERAPIST

## 2018-08-22 PROCEDURE — 97112 NEUROMUSCULAR REEDUCATION: CPT | Mod: GP | Performed by: PHYSICAL THERAPIST

## 2018-08-30 ENCOUNTER — THERAPY VISIT (OUTPATIENT)
Dept: PHYSICAL THERAPY | Facility: CLINIC | Age: 38
End: 2018-08-30
Payer: COMMERCIAL

## 2018-08-30 DIAGNOSIS — N94.10 FEMALE DYSPAREUNIA: Primary | ICD-10-CM

## 2018-08-30 DIAGNOSIS — M99.05 SOMATIC DYSFUNCTION OF PELVIC REGION: ICD-10-CM

## 2018-08-30 PROCEDURE — 97530 THERAPEUTIC ACTIVITIES: CPT | Mod: GP | Performed by: PHYSICAL THERAPIST

## 2018-08-30 PROCEDURE — 97140 MANUAL THERAPY 1/> REGIONS: CPT | Mod: GP | Performed by: PHYSICAL THERAPIST

## 2018-09-04 ENCOUNTER — OFFICE VISIT (OUTPATIENT)
Dept: FAMILY MEDICINE | Facility: CLINIC | Age: 38
End: 2018-09-04
Payer: COMMERCIAL

## 2018-09-04 VITALS
OXYGEN SATURATION: 99 % | DIASTOLIC BLOOD PRESSURE: 86 MMHG | RESPIRATION RATE: 16 BRPM | SYSTOLIC BLOOD PRESSURE: 123 MMHG | BODY MASS INDEX: 28.41 KG/M2 | TEMPERATURE: 97.8 F | HEART RATE: 78 BPM | HEIGHT: 67 IN | WEIGHT: 181 LBS

## 2018-09-04 DIAGNOSIS — R21 RASH: Primary | ICD-10-CM

## 2018-09-04 DIAGNOSIS — R63.5 WEIGHT GAIN: ICD-10-CM

## 2018-09-04 LAB
CRP SERPL-MCNC: <2.9 MG/L (ref 0–8)
ERYTHROCYTE [SEDIMENTATION RATE] IN BLOOD: 4 MM/HR (ref 0–20)
TSH SERPL DL<=0.005 MIU/L-ACNC: 2.04 MU/L (ref 0.4–4)

## 2018-09-04 ASSESSMENT — ENCOUNTER SYMPTOMS
ARTHRALGIAS: 1
JOINT SWELLING: 1
RESPIRATORY NEGATIVE: 1
UNEXPECTED WEIGHT CHANGE: 1
EYES NEGATIVE: 1
CHILLS: 1
ABDOMINAL PAIN: 1

## 2018-09-04 NOTE — PROGRESS NOTES
Preceptor Attestation:   Patient seen, evaluated and discussed with the resident. I have verified the content of the note, which accurately reflects my assessment of the patient and the plan of care.   Supervising Physician:  Chica Valdez MD

## 2018-09-04 NOTE — MR AVS SNAPSHOT
After Visit Summary   9/4/2018    Janet Ryder    MRN: [id number]           Patient Information     Date Of Birth          1980        Visit Information        Provider Department      9/4/2018 2:20 PM Jasvir Hernandez,  [facility]        Today's Diagnoses     Rash    -  1    Weight gain           Follow-ups after your visit        Your next 10 appointments already scheduled     Sep 12, 2018  9:30 AM CDT   (Arrive by 9:15 AM)   New Patient Visit with Chica Valdez MD   [facility] ([facility])    [address] [place] [address]  [address] [facility] [id number] [phone]            Sep 18, 2018 10:40 AM CDT   Return Visit with MD [facility] ([facility])    [address],  [address]  [facility] [address]   [phone]            Sep 19, 2018  8:50 AM CDT   DANIELLE For Women Only with Susana Ng, PT   [facility] ([facility]  )    [address] [place] [address]   [phone]            Sep 20, 2018 11:00 AM CDT   Return Visit with MD [facility] ([facility])    [address],  [address] [place] [address]   [phone]            Oct 04, 2018  8:45 AM CDT   (Arrive by 8:30 AM)   Cystoscopy with Emily Snell MD   [facility] Prostate and Urologic Cancers ([facility])    [address] [place] [address]  [address] [facility] [id number] [phone]            Nov 26, 2018  2:15 PM CST   (Arrive by 2:00 PM)   New Allergy with Christiano Zurita MD   [facility] ([facility])    [address] [place] [address]  [address] [place] [id number] [phone]           Do not take anti-histamines or Zantac for seven day prior to your appointment.     "          Who to contact     Please call your clinic at [phone] to:    Ask questions about your health    Make or cancel appointments    Discuss your medicines    Learn about your test results    Speak to your doctor            Additional Information About Your Visit        [employer] Information     [employer] gives you secure access to your electronic health record. If you see a primary care provider, you can also send messages to your care team and make appointments. If you have questions, please call your primary care clinic.  If you do not have a primary care provider, please call [phone] and they will assist you.      [employer] is an electronic gateway that provides easy, online access to your medical records. With [employer], you can request a clinic appointment, read your test results, renew a prescription or communicate with your care team.     To access your existing account, please contact your [facility] or call [phone] for assistance.        Care EveryWhere ID     This is your Care EveryWhere ID. This could be used by other organizations to access your [place] medical records  [id number]        Your Vitals Were     Pulse Temperature Respirations Height Pulse Oximetry Breastfeeding?    78 97.8  F (36.6  C) (Oral) 16 5' 6.73\" (169.5 cm) 99% No    BMI (Body Mass Index)                   28.58 kg/m2            Blood Pressure from Last 3 Encounters:   09/04/18 123/86   06/28/18 107/65   06/21/18 110/71    Weight from Last 3 Encounters:   09/04/18 181 lb (82.1 kg)   06/28/18 181 lb 4.8 oz (82.2 kg)   06/21/18 184 lb (83.5 kg)              We Performed the Following     Anti Nuclear Joslyn IgG by IFA with Reflex     CRP inflammation     Erythrocyte Sedimentation Rate ([facility])     TSH with free T4 reflex        Primary Care Provider Office Phone # Fax #    [facility] [phone]       [address] [facility] [address]        Equal Access to Services  "    JOE FORREST : Hadii aad marianna kaylah Alfred, wawinterda luqadaha, qaybta kaalmada nicdenisejeff, emiliano josé manuel moyespinozasonam gracia. So [facility] [phone].    ATENCIÓN: Si habla español, tiene a hernandez disposición servicios gratuitos de asistencia lingüística. Llame al [phone].    We comply with applicable federal civil rights laws and [place] laws. We do not discriminate on the basis of race, color, national origin, age, disability, sex, sexual orientation, or gender identity.            Thank you!     Thank you for choosing [facility]  for your care. Our goal is always to provide you with excellent care. Hearing back from our patients is one way we can continue to improve our services. Please take a few minutes to complete the written survey that you may receive in the mail after your visit with us. Thank you!             Your Updated Medication List - Protect others around you: Learn how to safely use, store and throw away your medicines at www.disposemymeds.org.          This list is accurate as of 9/4/18  3:17 PM.  Always use your most recent med list.                   Brand Name Dispense Instructions for use Diagnosis    DAILY MULTIVITAMIN PO      Take  by mouth.        FISH OIL PO      Take  by mouth.        fluticasone 50 MCG/ACT spray    FLONASE    3 Package    Spray 1 spray into both nostrils daily.    Seasonal allergies       triamcinolone 0.1 % cream    KENALOG     CHANELLE EXT AA BID        VITAMIN D PO      Take 5,000 Units by mouth.

## 2018-09-04 NOTE — PROGRESS NOTES
"       HPI       Janet Ryder is a 38 year old  who presents for   Chief Complaint   Patient presents with     Derm Problem     Shingles     Patient here with rash on forehead. She has burning pain in her forehead and into her arm. She is concerned about shingles. She has had this spot on her forehead before, lasted for a few weeks. Patient has also had pain shooting into her left arm before, which lasted a few months. She has never had shingles confirmed.    Does have history of eczema, used triacinolone with minimal relief.    Patient gets joint pain from time to time. She gets stiff in her fingers, elbows, hips and knees. Does feel stiff in AM, gets better throughout the day, then worsens again at night. Takes 1 hour to improve stifness.    From time to time her fingers will turn red.    +++++++      Problem, Medication and Allergy Lists were reviewed and updated if needed..    Patient is an established patient of this clinic..         Review of Systems:   Review of Systems   Constitutional: Positive for chills and unexpected weight change (weight gain).   HENT: Negative.    Eyes: Negative.    Respiratory: Negative.    Gastrointestinal: Positive for abdominal pain.   Endocrine: Positive for cold intolerance.   Genitourinary: Positive for dyspareunia.   Musculoskeletal: Positive for arthralgias and joint swelling.   Skin: Positive for rash.            Physical Exam:     Vitals:    09/04/18 1425   BP: 123/86   Pulse: 78   Resp: 16   Temp: 97.8  F (36.6  C)   TempSrc: Oral   SpO2: 99%   Weight: 181 lb (82.1 kg)   Height: 5' 6.73\" (169.5 cm)     Body mass index is 28.58 kg/(m^2).  Vitals were reviewed and were normal     Physical Exam   Constitutional: She is oriented to person, place, and time. She appears well-developed and well-nourished. No distress.   HENT:   Head: Normocephalic and atraumatic.       Mouth/Throat: Oropharynx is clear and moist.   Very faint macular rash without scaling, crusting or vesicle " formation   Eyes: Pupils are equal, round, and reactive to light. Right eye exhibits no discharge. Left eye exhibits no discharge. No scleral icterus.   Neck: Neck supple. No thyromegaly present.   Pulmonary/Chest: Effort normal.   Abdominal: Soft. She exhibits no distension. There is no tenderness. There is no rebound and no guarding.   Musculoskeletal:   FROM and swelling of knees, elbows and all digits. Normal gait   Neurological: She is alert and oriented to person, place, and time.   Skin: Skin is warm and dry. She is not diaphoretic.   Psychiatric: She has a normal mood and affect. Her behavior is normal.     General- No acute distress, well-appearing  Skin- warm, no obvious skin lesions  Neuro- AOX3, CN 2-12 grossly intact  Cardio- RRR, no murmurs  Pulmonary- CTA in all fields, no wheezes or rhales  Psych- appropriate mood and affect    Results:   Results are ordered and pending    Assessment and Plan        1. Rash    Likely eczema vs other benign etiology. Very faint in apperance. Patient does have history of facial rash, joint swelling/stiffness that makes me more concerned for autoimmune/inflammatory process. I plan to check AUGUSTIN today, and then if positive move forward with more complete rheum panel    - Anti Nuclear Joslyn IgG by IFA with Reflex  - Erythrocyte Sedimentation Rate ([facility])  - CRP inflammation    2. Weight gain    Hasn't been able to exercise as much with joint pain. Likely 2/2 to above process. Would like to exclude hypothyroidism as possible etiology.    - TSH with free T4 reflex       There are no discontinued medications.    Options for treatment and follow-up care were reviewed with the patient. Janet Ryder  engaged in the decision making process and verbalized understanding of the options discussed and agreed with the final plan.    Jasvir Hernandez, DO

## 2018-09-06 LAB
ANA PAT SER IF-IMP: ABNORMAL
ANA SER QL IF: ABNORMAL
ANA TITR SER IF: ABNORMAL {TITER}

## 2018-09-07 NOTE — TELEPHONE ENCOUNTER
FUTURE VISIT INFORMATION      FUTURE VISIT INFORMATION:    Date: 9/12    Time: 9:30    Location:   REFERRAL INFORMATION:    Referring provider:  SELF    Referring providers clinic:      Reason for visit/diagnosis: Right Knee Injury (DOI May 2018) and Right Hip Dysplasia        RECORDS STATUS      NO OUTSIDE RECORDS

## 2018-09-12 ENCOUNTER — TELEPHONE (OUTPATIENT)
Dept: ORTHOPEDICS | Facility: CLINIC | Age: 38
End: 2018-09-12

## 2018-09-12 ENCOUNTER — PRE VISIT (OUTPATIENT)
Dept: ORTHOPEDICS | Facility: CLINIC | Age: 38
End: 2018-09-12

## 2018-09-12 ENCOUNTER — OFFICE VISIT (OUTPATIENT)
Dept: ORTHOPEDICS | Facility: CLINIC | Age: 38
End: 2018-09-12
Payer: COMMERCIAL

## 2018-09-12 ENCOUNTER — RADIANT APPOINTMENT (OUTPATIENT)
Dept: GENERAL RADIOLOGY | Facility: CLINIC | Age: 38
End: 2018-09-12
Payer: COMMERCIAL

## 2018-09-12 VITALS — WEIGHT: 181 LBS | HEIGHT: 67 IN | RESPIRATION RATE: 16 BRPM | BODY MASS INDEX: 28.41 KG/M2

## 2018-09-12 DIAGNOSIS — M25.561 RIGHT ANTERIOR KNEE PAIN: ICD-10-CM

## 2018-09-12 DIAGNOSIS — M25.551 RIGHT HIP PAIN: ICD-10-CM

## 2018-09-12 DIAGNOSIS — M25.551 RIGHT HIP PAIN: Primary | ICD-10-CM

## 2018-09-12 NOTE — LETTER
9/12/2018      RE: Janet Ryder  [address]  [facility] [address]        Subjective:   Janet Rydre is a 38 year old female who is c/o right knee and right hip pain. The pt reports insidious onset of anterior, medial, and lateral knee pain and swelling after a trip to [place] in May. She states that she walked over 5 miles a day. She reports that her chiropractor to took an xray and told her that she had mild dysplasia in her right hip. She states that the hip will slip on her the last 6 years. She used to run.  Borderline + for autoimmune disorder, more tests to be done.  Stairs hurt.  Mild hip dysplasia told via x-ray by chiropractic.  Pt ran cross country and has had issues.  After she turned 31 yo, seems to slip out of place.  Right hip slipping out of place and 30 min prior to being able to move again.  Right knee swelling over on [place] trip.  Stretching hurts, twisting, standing, turning too quickly.  Happened in May, seen by Dr. Robles.  Going to PT for PF.  Biking and walking, all activities make the knee feel worse.  Fell in the shower two weeks ago, not sure if Hip went out, hit right back on side of the tub  Pelvic floor PT- thought initially that she had inflammation in her bladder but seen by Urology for cysto  Rashes on elbows, redness across MCPs, feels like she has arthritis everywhere  Standing is a problem.  Any activity and even bike ride isn't comfortable  No injections into her knee  Important for her to lose weight.  Needs to start swimming, nutrition- considering changes for autoimmune condition, read about on the internet  Substituting tea for coffee, decreasing dairy.    Background:   Date of injury:   Duration of symptoms: 3 months for knee; years for her right hip  Mechanism of Injury: Insidious Onset; Unknown   Intensity: 3-4/10 for knee; soreness in hip  Aggravating factors: Tibial rotation, any use of the knee  Relieving Factors: Nothing   Prior Evaluation: Chiropractor  "for right hip    PAST MEDICAL, SOCIAL, SURGICAL AND FAMILY HISTORY: She  has a past medical history of Atypical migraine.  She  has a past surgical history that includes NONSPECIFIC PROCEDURE (AGE 6, AGE 10); NONSPECIFIC PROCEDURE (AGE 22); and orthopedic surgery.  Her family history includes Alcohol/Drug in her father; Aneurysm in her maternal uncle; Cancer in her maternal grandfather; Cancer - colorectal in her maternal grandfather; Cerebrovascular Disease in her paternal grandfather; GASTROINTESTINAL DISEASE in her maternal grandmother; Glaucoma in her maternal grandfather; Hypertension in her mother; Lung Cancer in her mother; Ovarian Cancer in her mother; Respiratory in her maternal grandfather.  She reports that she has never smoked. She has never used smokeless tobacco. She reports that she drinks alcohol. She reports that she does not use illicit drugs.    ALLERGIES: She is allergic to shellfish-derived products; amoxicillin; erythromycin; no clinical screening - see comments; and scopolamine.    CURRENT MEDICATIONS: She has a current medication list which includes the following prescription(s): cholecalciferol, fluticasone, multiple vitamins-minerals, omega-3 fatty acids, and triamcinolone.     REVIEW OF SYSTEMS: 10 point review of systems is negative except as noted above.     Exam:   Resp 16  Ht 5' 6.73\" (1.695 m)  Wt 181 lb (82.1 kg)  BMI 28.58 kg/m2           CONSTITUTIONAL: alert, mild distress, cooperative and over weight  HEAD: Normocephalic. No masses, lesions, tenderness or abnormalities  SKIN: no suspicious lesions or rashes  GAIT: valgus alignment  NEUROLOGIC: Non-focal  PSYCHIATRIC: affect normal/bright and mentation appears normal.    MUSCULOSKELETAL: right hip and knee pain      Inspection:       AP/lateral alignment normal  Tender: anterior knee      Non-tender: patellar facets, MCL, LCL, lateral joint line, medial joint line, IT band, posterior knee   Active Range of Motion: all " normal  Strength: quad  5-/5, Hamstrings  5-/5, Gastroc  5/5, Tibialis anterior  5/5, Peroneals  5/5 and core strength  3/5 hip abductors and other core muscles   Special tests: normal Valgus stress test, normal Varus, negative Lachman's test, negative Lynn's, no apprehension with lateral stress of the patella.    Palpation: Tender:   Right   Non-tender:  left greater trochanter, right greater trochanter, left gluteus medius, left ASIS, right ASIS  Range of Motion:  Right Hip  Full ROM, tight hip flexors  Strength:  full strength  Special tests:  no crepitation/snapping over central inguinal region, no crepitation/snapping over greater trochanter, weakness hip abductors         Assessment/Plan:   Pt is a 37 yo white female with PMHx of pelvic floor weakness presenting with right hip and knee pain  1. Right hip pain- tight hip flexors, concern labral tear  Pt with hip weakness  MRI hip ordered with ALBERTINA  Pelvic floor PT with Susana Ng  2. Right knee pain- medial knee pain, on-going  Recommended strengthening with PT  MRI knee  RTC after MRI imaging available  3. Borderline AUGUSTIN- undergoing work-up for autoimmune disorder with PMD    X-RAY INTERPRETATION:   X-Ray of the six foot standing:  ordered and interpreted in the office today was negative for fracture, subluxation or joint space abnormality.    Chica Valdez MD

## 2018-09-12 NOTE — LETTER
9/12/2018      RE: Janet Ryder  [address]  [facility] [address]        Subjective:   Janet Ryder is a 38 year old female who is c/o right knee and right hip pain. The pt reports insidious onset of anterior, medial, and lateral knee pain and swelling after a trip to [place] in May. She states that she walked over 5 miles a day. She reports that her chiropractor to took an xray and told her that she had mild dysplasia in her right hip. She states that the hip will slip on her the last 6 years. She used to run.  Borderline + for autoimmune disorder, more tests to be done.  Stairs hurt.  Mild hip dysplasia told via x-ray by chiropractic.  Pt ran cross country and has had issues.  After she turned 31 yo, seems to slip out of place.  Right hip slipping out of place and 30 min prior to being able to move again.  Right knee swelling over on [place] trip.  Stretching hurts, twisting, standing, turning too quickly.  Happened in May, seen by Dr. Robles.  Going to PT for PF.  Biking and walking, all activities make the knee feel worse.  Fell in the shower two weeks ago, not sure if Hip went out, hit right back on side of the tub  Pelvic floor PT- thought initially that she had inflammation in her bladder but seen by Urology for cysto  Rashes on elbows, redness across MCPs, feels like she has arthritis everywhere  Standing is a problem.  Any activity and even bike ride isn't comfortable  No injections into her knee  Important for her to lose weight.  Needs to start swimming, nutrition- considering changes for autoimmune condition, read about on the internet  Substituting tea for coffee, decreasing dairy.    Background:   Date of injury:   Duration of symptoms: 3 months for knee; years for her right hip  Mechanism of Injury: Insidious Onset; Unknown   Intensity: 3-4/10 for knee; soreness in hip  Aggravating factors: Tibial rotation, any use of the knee  Relieving Factors: Nothing   Prior Evaluation: Chiropractor  "for right hip    PAST MEDICAL, SOCIAL, SURGICAL AND FAMILY HISTORY: She  has a past medical history of Atypical migraine.  She  has a past surgical history that includes NONSPECIFIC PROCEDURE (AGE 6, AGE 10); NONSPECIFIC PROCEDURE (AGE 22); and orthopedic surgery.  Her family history includes Alcohol/Drug in her father; Aneurysm in her maternal uncle; Cancer in her maternal grandfather; Cancer - colorectal in her maternal grandfather; Cerebrovascular Disease in her paternal grandfather; GASTROINTESTINAL DISEASE in her maternal grandmother; Glaucoma in her maternal grandfather; Hypertension in her mother; Lung Cancer in her mother; Ovarian Cancer in her mother; Respiratory in her maternal grandfather.  She reports that she has never smoked. She has never used smokeless tobacco. She reports that she drinks alcohol. She reports that she does not use illicit drugs.    ALLERGIES: She is allergic to shellfish-derived products; amoxicillin; erythromycin; no clinical screening - see comments; and scopolamine.    CURRENT MEDICATIONS: She has a current medication list which includes the following prescription(s): cholecalciferol, fluticasone, multiple vitamins-minerals, omega-3 fatty acids, and triamcinolone.     REVIEW OF SYSTEMS: 10 point review of systems is negative except as noted above.     Exam:   Resp 16  Ht 5' 6.73\" (1.695 m)  Wt 181 lb (82.1 kg)  BMI 28.58 kg/m2           CONSTITUTIONAL: alert, mild distress, cooperative and over weight  HEAD: Normocephalic. No masses, lesions, tenderness or abnormalities  SKIN: no suspicious lesions or rashes  GAIT: valgus alignment  NEUROLOGIC: Non-focal  PSYCHIATRIC: affect normal/bright and mentation appears normal.    MUSCULOSKELETAL: right hip and knee pain      Inspection:       AP/lateral alignment normal  Tender: anterior knee      Non-tender: patellar facets, MCL, LCL, lateral joint line, medial joint line, IT band, posterior knee   Active Range of Motion: all " normal  Strength: quad  5-/5, Hamstrings  5-/5, Gastroc  5/5, Tibialis anterior  5/5, Peroneals  5/5 and core strength  3/5 hip abductors and other core muscles   Special tests: normal Valgus stress test, normal Varus, negative Lachman's test, negative Lynn's, no apprehension with lateral stress of the patella.    Palpation: Tender:   Right   Non-tender:  left greater trochanter, right greater trochanter, left gluteus medius, left ASIS, right ASIS  Range of Motion:  Right Hip  Full ROM, tight hip flexors  Strength:  full strength  Special tests:  no crepitation/snapping over central inguinal region, no crepitation/snapping over greater trochanter, weakness hip abductors         Assessment/Plan:   Pt is a 37 yo white female with PMHx of pelvic floor weakness presenting with right hip and knee pain  1. Right hip pain- tight hip flexors, concern labral tear  Pt with hip weakness  MRI hip ordered with ALBERTINA  Pelvic floor PT with Susana Ng  2. Right knee pain- medial knee pain, on-going  Recommended strengthening with PT  MRI knee  RTC after MRI imaging available  3. Borderline AUGUSTIN- undergoing work-up for autoimmune disorder with PMD    X-RAY INTERPRETATION:   X-Ray of the six foot standing:  ordered and interpreted in the office today was negative for fracture, subluxation or joint space abnormality.    Chica Valdez MD

## 2018-09-12 NOTE — MR AVS SNAPSHOT
After Visit Summary   9/12/2018    Janet Ryder    MRN: [id number]           Patient Information     Date Of Birth          1980        Visit Information        Provider Department      9/12/2018 9:30 AM Chica Valdez MD [facility]        Today's Diagnoses     Right hip pain    -  1    Right anterior knee pain           Follow-ups after your visit        Your next 10 appointments already scheduled     Sep 17, 2018  3:30 PM CDT   XR HIP CONTRAST CT/MR INJECTION with [id number],  IMAGING NURSE, UC IR RAD   [facility] Xray ([facility])    [address] [address] [phone]           How do I prepare for my exam? (Food and drink instructions) No Food and Drink Restrictions.  How do I prepare for my exam? (Other instructions) * If you take Coumadin (or any other blood thinners) you may need to stop taking them up to 3 days prior to the exam. Talk to your doctor before stopping. * If you take Plavix, Ticlid, Pletal or Persantine, please ask your doctor if you should stop these medicines. You may need extra tests on the morning of your scan.  What should I wear: Wear comfortable clothes.  What should I bring: Please bring any scans or X-rays taken at other hospitals, if similar tests were done. Also bring a list of your medicines, including vitamins, minerals and over-the-counter drugs. It is safest to leave personal items at home. Do I need a :  No  is needed.  What do I need to tell my doctor: Tell your doctor in advance: * If you are allergic to X-ray dye (contrast fluid). * If you may be pregnant.  What is this test: An arthrogram is an X-ray exam of a joint. We will take a set of X-ray pictures. We will then inject dye (contrast fluid) into the area around the joint. After that, we will take another set of X-ray pictures. The dye helps your doctor see the joint better on the X-rays.   Who should I call with questions: If you have any questions, please call the Imaging Department where you will have your exam. Directions, parking instructions, and other information is available on our website, [email or web address].            Sep 17, 2018  4:00 PM CDT   MR HIP RIGHT W CONTRAST with [id number]   [facility] MRI ([facility])    [address] [address] [phone]           Take your medicines as usual, unless your doctor tells you not to. Bring a list of your current medicines to your exam (including vitamins, minerals and over-the-counter drugs).  You may or may not receive intravenous (IV) contrast for this exam pending the discretion of the Radiologist.  You do not need to do anything special to prepare.  The MRI machine uses a strong magnet. Please wear clothes without metal (snaps, zippers). A sweatsuit works well, or we may give you a hospital gown.  Please remove any body piercings and hair extensions before you arrive. You will also remove watches, jewelry, hairpins, wallets, dentures, partial dental plates and hearing aids. You may wear contact lenses, and you may be able to wear your rings. We have a safe place to keep your personal items, but it is safer to leave them at home.  **IMPORTANT** THE INSTRUCTIONS BELOW ARE ONLY FOR THOSE PATIENTS WHO HAVE BEEN PRESCRIBED SEDATION OR GENERAL ANESTHESIA DURING THEIR MRI PROCEDURE:  IF YOUR DOCTOR PRESCRIBED ORAL SEDATION (take medicine to help you relax during your exam):   You must get the medicine from your doctor (oral medication) before you arrive. Bring the medicine to the exam. Do not take it at home. You ll be told when to take it upon arriving for your exam.   Arrive one hour early. Bring someone who can take you home after the test. Your medicine will make you sleepy. After the exam, you may not drive, take a bus or take a taxi by yourself.  IF YOUR DOCTOR  PRESCRIBED IV SEDATION:   Arrive one hour early. Bring someone who can take you home after the test. Your medicine will make you sleepy. After the exam, you may not drive, take a bus or take a taxi by yourself.   No eating 6 hours before your exam. You may have clear liquids up until 4 hours before your exam. (Clear liquids include water, clear tea, black coffee and fruit juice without pulp.)  IF YOUR DOCTOR PRESCRIBED ANESTHESIA (be asleep for your exam):   Arrive 1 1/2 hours early. Bring someone who can take you home after the test. You may not drive, take a bus or take a taxi by yourself.   No eating 8 hours before your exam. You may have clear liquids up until 4 hours before your exam. (Clear liquids include water, clear tea, black coffee and fruit juice without pulp.)   You will spend four to five hours in the recovery room.  Please call the Imaging Department at your exam site with any questions.            Sep 18, 2018 10:40 AM CDT   Return Visit with Leatha Robles MD   [facility] ([facility])    [address] [place] [address],  [address]  [facility] [address]   [phone]            Sep 19, 2018  8:50 AM CDT   DANIELLE For Women Only with Susana Ng PT   [facility]  )    [address] [place] [id number] [phone]            Sep 19, 2018  4:00 PM CDT   MR KNEE RIGHT W/O CONTRAST with [id number]   [facility] ([facility])    [address] [facility]  1st Floor  [facility] [id number] [phone]           Take your medicines as usual, unless your doctor tells you not to. Bring a list of your current medicines to your exam (including vitamins, minerals and over-the-counter drugs). Also bring the results of similar scans you may have had.  Please remove any body piercings and hair extensions before you arrive.  Follow your doctor s orders. If you do not, we may have  to postpone your exam.  You may or may not receive IV contrast for this exam pending the discretion of the Radiologist.  You do not need to do anything special to prepare.  The MRI machine uses a strong magnet. Please wear clothes without metal (snaps, zippers). A sweatsuit works well, or we may give you a hospital gown.   **IMPORTANT** THE INSTRUCTIONS BELOW ARE ONLY FOR THOSE PATIENTS WHO HAVE BEEN PRESCRIBED SEDATION OR GENERAL ANESTHESIA DURING THEIR MRI PROCEDURE:  IF YOUR DOCTOR PRESCRIBED ORAL SEDATION (take medicine to help you relax during your exam):   You must get the medicine from your doctor (oral medication) before you arrive. Bring the medicine to the exam. Do not take it at home. You ll be told when to take it upon arriving for your exam.   Arrive one hour early. Bring someone who can take you home after the test. Your medicine will make you sleepy. After the exam, you may not drive, take a bus or take a taxi by yourself.  IF YOUR DOCTOR PRESCRIBED IV SEDATION:   Arrive one hour early. Bring someone who can take you home after the test. Your medicine will make you sleepy. After the exam, you may not drive, take a bus or take a taxi by yourself.   No eating 6 hours before your exam. You may have clear liquids up until 4 hours before your exam. (Clear liquids include water, clear tea, black coffee and fruit juice without pulp.)  IF YOUR DOCTOR PRESCRIBED ANESTHESIA (be asleep for your exam):   Arrive 1 1/2 hours early. Bring someone who can take you home after the test. You may not drive, take a bus or take a taxi by yourself.   No eating 8 hours before your exam. You may have clear liquids up until 4 hours before your exam. (Clear liquids include water, clear tea, black coffee and fruit juice without pulp.)   You will spend four to five hours in the recovery room.  Please call the Imaging Department at your exam site with any questions.            Sep 20, 2018 11:00 AM CDT   Return Visit with Leatha  MD Travis   [facility] ([facility])    [address],  [address]  [facility] [address]   [phone]            Sep 21, 2018  2:50 PM CDT   (Arrive by 2:35 PM)   Return Visit with Chica Valdez MD   [facility] ([facility])    [address]  5th Floor  [facility] [id number] [phone]            Oct 04, 2018  8:45 AM CDT   (Arrive by 8:30 AM)   Cystoscopy with Emily Snell MD   [facility] ([facility])    [address] [facility] [address]  4th Floor  [facility] [id number] [phone]              Future tests that were ordered for you today     Open Future Orders        Priority Expected Expires Ordered    XR Hip Contrast CT/MR Injection Routine 9/12/2018 9/12/2019 9/12/2018    MR Knee Right w/o Contrast Routine  9/12/2019 9/12/2018    MR Hip Right w Contrast Routine  9/12/2019 9/12/2018            Who to contact     Please call your clinic at [phone] to:    Ask questions about your health    Make or cancel appointments    Discuss your medicines    Learn about your test results    Speak to your doctor            Additional Information About Your Visit        ImageVision Information     [employer] gives you secure access to your electronic health record. If you see a primary care provider, you can also send messages to your care team and make appointments. If you have questions, please call your primary care clinic.  If you do not have a primary care provider, please call [phone] and they will assist you.      [employer] is an electronic gateway that provides easy, online access to your medical records. With [employer], you can request a clinic appointment, read your test results, renew a prescription or communicate with your care team.     To access your existing account, please contact your [facility] or call  "[phone] for assistance.        Care EveryWhere ID     This is your Care EveryWhere ID. This could be used by other organizations to access your [employer] medical records  [id number]        Your Vitals Were     Respirations Height BMI (Body Mass Index)             16 5' 6.73\" (1.695 m) 28.58 kg/m2          Blood Pressure from Last 3 Encounters:   09/04/18 123/86   06/28/18 107/65   06/21/18 110/71    Weight from Last 3 Encounters:   09/12/18 181 lb (82.1 kg)   09/04/18 181 lb (82.1 kg)   06/28/18 181 lb 4.8 oz (82.2 kg)              We Performed the Following     INJECTION HIP ARTHROGRAM W/O ANESTH        Primary Care Provider Office Phone # Fax #    Leatha Robles [phone]       [address] [address]        Equal Access to Services     JOE FORREST : Hadii wayne barcenaso Sokristine, waaxda luqadaha, qaybta kaalmada adeegyajeff, emiliano schwartz . So [facility] [phone].    ATENCIÓN: Si loanla ping, tiene a hernandez disposición servicios gratuitos de asistencia lingüística. Llame al [phone].    We comply with applicable federal civil rights laws and [place] laws. We do not discriminate on the basis of race, color, national origin, age, disability, sex, sexual orientation, or gender identity.            Thank you!     Thank you for choosing [facility]  for your care. Our goal is always to provide you with excellent care. Hearing back from our patients is one way we can continue to improve our services. Please take a few minutes to complete the written survey that you may receive in the mail after your visit with us. Thank you!             Your Updated Medication List - Protect others around you: Learn how to safely use, store and throw away your medicines at www.disposemymeds.org.          This list is accurate as of 9/12/18 11:02 AM.  Always use your most recent med list.                   Brand Name Dispense Instructions for use " Diagnosis    DAILY MULTIVITAMIN PO      Take  by mouth.        FISH OIL PO      Take  by mouth.        fluticasone 50 MCG/ACT spray    FLONASE    3 Package    Spray 1 spray into both nostrils daily.    Seasonal allergies       triamcinolone 0.1 % cream    KENALOG     CHANELLE EXT AA BID        VITAMIN D PO      Take 5,000 Units by mouth.

## 2018-09-12 NOTE — TELEPHONE ENCOUNTER
[facility] Call Center    Phone Message    May a detailed message be left on voicemail: yes    Reason for Call: Other: Pt would like Dr. Valdez to verify that MRI order is correct.     Action Taken: Message routed to:  [facility]

## 2018-09-18 ENCOUNTER — OFFICE VISIT (OUTPATIENT)
Dept: FAMILY MEDICINE | Facility: CLINIC | Age: 38
End: 2018-09-18
Payer: COMMERCIAL

## 2018-09-18 VITALS
OXYGEN SATURATION: 100 % | TEMPERATURE: 98.4 F | RESPIRATION RATE: 16 BRPM | SYSTOLIC BLOOD PRESSURE: 114 MMHG | BODY MASS INDEX: 27.84 KG/M2 | HEART RATE: 66 BPM | WEIGHT: 177.4 LBS | DIASTOLIC BLOOD PRESSURE: 70 MMHG | HEIGHT: 67 IN

## 2018-09-18 DIAGNOSIS — R21 RASH: ICD-10-CM

## 2018-09-18 DIAGNOSIS — F43.21 ADJUSTMENT DISORDER WITH DEPRESSED MOOD: ICD-10-CM

## 2018-09-18 DIAGNOSIS — R51.9 HEADACHE ABOVE THE EYE REGION: Primary | ICD-10-CM

## 2018-09-18 DIAGNOSIS — M25.50 PAIN IN JOINT, MULTIPLE SITES: ICD-10-CM

## 2018-09-18 RX ORDER — BUPROPION HYDROCHLORIDE 75 MG/1
TABLET ORAL
Qty: 90 TABLET | Refills: 0 | Status: SHIPPED | OUTPATIENT
Start: 2018-09-18 | End: 2018-10-11
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
## 2018-09-18 NOTE — PATIENT INSTRUCTIONS
Here is the plan from today's visit    1. Adjustment disorder with depressed mood  1. One in am and if tolerate, go to 1 twice a day.  2. We can quickly move you to a long acting pill - if this is working for you - so Lyudmilat me and I can switch it. MyChart me with any questions.   3. Come see me in 3 weeks to see how this is going.     - buPROPion (WELLBUTRIN) 75 MG tablet; Start with one in the am, and if you tolerate, increase to 1 twice a day.  Dispense: 90 tablet; Refill: 0    2. I will contact the rheumatologists to discuss getting you seen.     3. Start the Aleve 2 days before your period.  Take it 2 twice a day and continue through the first 2 days of your period.     4. Try, if possible to sleep 9 hours.      Please call or return to clinic if your symptoms don't go away.    Follow up plan  3 weeks     Thank you for coming to [facility] today.  Lab Testing:  **If you had lab testing today and your results are reassuring or normal they will be mailed to you or sent through [employer] within 7 days.   **If the lab tests need quick action we will call you with the results.  The phone number we will call with results is # [phone] (home) . If this is not the best number please call our clinic and change the number.  Medication Refills:  If you need any refills please call your pharmacy and they will contact us.   If you need to  your refill at a new pharmacy, please contact the new pharmacy directly. The new pharmacy will help you get your medications transferred faster.   Scheduling:  If you have any concerns about today's visit or wish to schedule another appointment please call our office during normal business hours [phone] (8-5:00 M-F)  If a referral was made to a [facility] and you don't get a call from central scheduling please call [phone].  If a Mammogram was ordered for you at [facility] call [phone] to schedule or change your appointment.  If  you had an XRay/CT/Ultrasound/MRI ordered the number is [phone] to schedule or change your radiology appointment.   Medical Concerns:  If you have urgent medical concerns please call [phone] at any time of the day.  If you have a medical emergency please call [phone].

## 2018-09-18 NOTE — MR AVS SNAPSHOT
After Visit Summary   9/18/2018    Janet Ryder    MRN: [id number]           Patient Information     Date Of Birth          1980        Visit Information        Provider Department      9/18/2018 10:40 AM Leatha Robles MD [facility]        Today's Diagnoses     Adjustment disorder with depressed mood    -  1      Care Instructions    Here is the plan from today's visit    1. Adjustment disorder with depressed mood  1. One in am and if tolerate, go to 1 twice a day.  2. We can quickly move you to a long acting pill - if this is working for you - so MyChart me and I can switch it. MyChart me with any questions.   3. Come see me in 3 weeks to see how this is going.     - buPROPion (WELLBUTRIN) 75 MG tablet; Start with one in the am, and if you tolerate, increase to 1 twice a day.  Dispense: 90 tablet; Refill: 0    2. I will contact the rheumatologists to discuss getting you seen.     3. Start the Aleve 2 days before your period.  Take it 2 twice a day and continue through the first 2 days of your period.     4. Try, if possible to sleep 9 hours.      Please call or return to clinic if your symptoms don't go away.    Follow up plan  3 weeks     Thank you for coming to [facility] today.  Lab Testing:  **If you had lab testing today and your results are reassuring or normal they will be mailed to you or sent through [employer] within 7 days.   **If the lab tests need quick action we will call you with the results.  The phone number we will call with results is # [phone] (home) . If this is not the best number please call our clinic and change the number.  Medication Refills:  If you need any refills please call your pharmacy and they will contact us.   If you need to  your refill at a new pharmacy, please contact the new pharmacy directly. The new pharmacy will help you get your medications transferred faster.   Scheduling:  If you have any concerns about today's  visit or wish to schedule another appointment please call our office during normal business hours [phone] (8-5:00 M-F)  If a referral was made to a [facility] and you don't get a call from central scheduling please call [phone].  If a Mammogram was ordered for you at [facility] call [phone] to schedule or change your appointment.  If you had an XRay/CT/Ultrasound/MRI ordered the number is [phone] to schedule or change your radiology appointment.   Medical Concerns:  If you have urgent medical concerns please call [phone] at any time of the day.  If you have a medical emergency please call 212.          Follow-ups after your visit        Follow-up notes from your care team     Return in about 3 weeks (around 10/9/2018).      Your next 10 appointments already scheduled     Sep 19, 2018  8:50 AM CDT   DANIELLE For Women Only with Susana Ng PT   [facility] ([facility]  )    [address] [place] [id number] [phone]            Sep 19, 2018  4:00 PM CDT   MR KNEE RIGHT W/O CONTRAST with [id number]   [facility] ([facility])    [address] [address] [phone]           Take your medicines as usual, unless your doctor tells you not to. Bring a list of your current medicines to your exam (including vitamins, minerals and over-the-counter drugs). Also bring the results of similar scans you may have had.  Please remove any body piercings and hair extensions before you arrive.  Follow your doctor s orders. If you do not, we may have to postpone your exam.  You may or may not receive IV contrast for this exam pending the discretion of the Radiologist.  You do not need to do anything special to prepare.  The MRI machine uses a strong magnet. Please wear clothes without metal (snaps, zippers). A sweatsuit works well, or we  may give you a hospital gown.   **IMPORTANT** THE INSTRUCTIONS BELOW ARE ONLY FOR THOSE PATIENTS WHO HAVE BEEN PRESCRIBED SEDATION OR GENERAL ANESTHESIA DURING THEIR MRI PROCEDURE:  IF YOUR DOCTOR PRESCRIBED ORAL SEDATION (take medicine to help you relax during your exam):   You must get the medicine from your doctor (oral medication) before you arrive. Bring the medicine to the exam. Do not take it at home. You ll be told when to take it upon arriving for your exam.   Arrive one hour early. Bring someone who can take you home after the test. Your medicine will make you sleepy. After the exam, you may not drive, take a bus or take a taxi by yourself.  IF YOUR DOCTOR PRESCRIBED IV SEDATION:   Arrive one hour early. Bring someone who can take you home after the test. Your medicine will make you sleepy. After the exam, you may not drive, take a bus or take a taxi by yourself.   No eating 6 hours before your exam. You may have clear liquids up until 4 hours before your exam. (Clear liquids include water, clear tea, black coffee and fruit juice without pulp.)  IF YOUR DOCTOR PRESCRIBED ANESTHESIA (be asleep for your exam):   Arrive 1 1/2 hours early. Bring someone who can take you home after the test. You may not drive, take a bus or take a taxi by yourself.   No eating 8 hours before your exam. You may have clear liquids up until 4 hours before your exam. (Clear liquids include water, clear tea, black coffee and fruit juice without pulp.)   You will spend four to five hours in the recovery room.  Please call the Imaging Department at your exam site with any questions.            Sep 20, 2018 11:00 AM CDT   Return Visit with Leatha Robles MD   [facility] ([facility])    [address],  [address]  [facility] [address]   [phone]            Sep 21, 2018  2:30 PM CDT   XR HIP CONTRAST CT/MR INJECTION with LISA, CORINNE IMAGING NURSE, CORINNE RODGERS [facility] ([facility])    [address] [id number] [phone]           How do I prepare for my exam? (Food and drink instructions) No Food and Drink Restrictions.  How do I prepare for my exam? (Other instructions) * If you take Coumadin (or any other blood thinners) you may need to stop taking them up to 3 days prior to the exam. Talk to your doctor before stopping. * If you take Plavix, Ticlid, Pletal or Persantine, please ask your doctor if you should stop these medicines. You may need extra tests on the morning of your scan.  What should I wear: Wear comfortable clothes.  What should I bring: Please bring any scans or X-rays taken at other hospitals, if similar tests were done. Also bring a list of your medicines, including vitamins, minerals and over-the-counter drugs. It is safest to leave personal items at home. Do I need a :  No  is needed.  What do I need to tell my doctor: Tell your doctor in advance: * If you are allergic to X-ray dye (contrast fluid). * If you may be pregnant.  What is this test: An arthrogram is an X-ray exam of a joint. We will take a set of X-ray pictures. We will then inject dye (contrast fluid) into the area around the joint. After that, we will take another set of X-ray pictures. The dye helps your doctor see the joint better on the X-rays.  Who should I call with questions: If you have any questions, please call the Imaging Department where you will have your exam. Directions, parking instructions, and other information is available on our website, [email or web address].            Sep 21, 2018  3:15 PM CDT   MR HIP RIGHT W CONTRAST with [id number]   [facility] ([facility])    [address] [id number] [phone]           Take your medicines as usual, unless your doctor tells you not to. Bring a list of your current medicines to your exam (including  vitamins, minerals and over-the-counter drugs).  You may or may not receive intravenous (IV) contrast for this exam pending the discretion of the Radiologist.  You do not need to do anything special to prepare.  The MRI machine uses a strong magnet. Please wear clothes without metal (snaps, zippers). A sweatsuit works well, or we may give you a hospital gown.  Please remove any body piercings and hair extensions before you arrive. You will also remove watches, jewelry, hairpins, wallets, dentures, partial dental plates and hearing aids. You may wear contact lenses, and you may be able to wear your rings. We have a safe place to keep your personal items, but it is safer to leave them at home.  **IMPORTANT** THE INSTRUCTIONS BELOW ARE ONLY FOR THOSE PATIENTS WHO HAVE BEEN PRESCRIBED SEDATION OR GENERAL ANESTHESIA DURING THEIR MRI PROCEDURE:  IF YOUR DOCTOR PRESCRIBED ORAL SEDATION (take medicine to help you relax during your exam):   You must get the medicine from your doctor (oral medication) before you arrive. Bring the medicine to the exam. Do not take it at home. You ll be told when to take it upon arriving for your exam.   Arrive one hour early. Bring someone who can take you home after the test. Your medicine will make you sleepy. After the exam, you may not drive, take a bus or take a taxi by yourself.  IF YOUR DOCTOR PRESCRIBED IV SEDATION:   Arrive one hour early. Bring someone who can take you home after the test. Your medicine will make you sleepy. After the exam, you may not drive, take a bus or take a taxi by yourself.   No eating 6 hours before your exam. You may have clear liquids up until 4 hours before your exam. (Clear liquids include water, clear tea, black coffee and fruit juice without pulp.)  IF YOUR DOCTOR PRESCRIBED ANESTHESIA (be asleep for your exam):   Arrive 1 1/2 hours early. Bring someone who can take you home after the test. You may not drive, take a bus or take a taxi by yourself.   No  eating 8 hours before your exam. You may have clear liquids up until 4 hours before your exam. (Clear liquids include water, clear tea, black coffee and fruit juice without pulp.)   You will spend four to five hours in the recovery room.  Please call the Imaging Department at your exam site with any questions.            Sep 26, 2018 11:10 AM CDT   (Arrive by 10:55 AM)   Return Visit with Chica Valdez MD   [facility] ([facility])    [address] [facility]  5th Floor  [facility] [id number] [phone]            Sep 27, 2018 11:00 AM CDT   Return Visit with Leatha Robles MD   [facility] ([facility])    [address],  [address]  [facility] [id number] [phone]            Oct 04, 2018  8:45 AM CDT   (Arrive by 8:30 AM)   Cystoscopy with Emily Snell MD   [facility] ([facility])    [address] [place] [address]  4th Floor  [facility] [id number] [phone]              Who to contact     Please call your clinic at [phone] to:    Ask questions about your health    Make or cancel appointments    Discuss your medicines    Learn about your test results    Speak to your doctor            Additional Information About Your Visit        [employer] Information     [employer] gives you secure access to your electronic health record. If you see a primary care provider, you can also send messages to your care team and make appointments. If you have questions, please call your primary care clinic.  If you do not have a primary care provider, please call [phone] and they will assist you.      [employer] is an electronic gateway that provides easy, online access to your medical records. With [employer], you can request a clinic appointment, read your test results, renew a prescription or communicate with your care team.     To access your existing account,  "please contact your [facility] or call [phone] for assistance.        Care EveryWhere ID     This is your Care EveryWhere ID. This could be used by other organizations to access your [facility] medical records  [id number]        Your Vitals Were     Pulse Temperature Respirations Height Pulse Oximetry Breastfeeding?    66 98.4  F (36.9  C) (Oral) 16 5' 6.73\" (169.5 cm) 100% No    BMI (Body Mass Index)                   28.01 kg/m2            Blood Pressure from Last 3 Encounters:   09/18/18 114/70   09/04/18 123/86   06/28/18 107/65    Weight from Last 3 Encounters:   09/18/18 177 lb 6.4 oz (80.5 kg)   09/12/18 181 lb (82.1 kg)   09/04/18 181 lb (82.1 kg)              Today, you had the following     No orders found for display         Today's Medication Changes          These changes are accurate as of 9/18/18 11:43 AM.  If you have any questions, ask your nurse or doctor.               Start taking these medicines.        Dose/Directions    buPROPion 75 MG tablet   Commonly known as:  WELLBUTRIN   Used for:  Adjustment disorder with depressed mood   Started by:  Leatha Robles MD        Start with one in the am, and if you tolerate, increase to 1 twice a day.   Quantity:  90 tablet   Refills:  0            Where to get your medicines      These medications were sent to [facility] [address] [place] [address] AT [address] [address]    Hours:  24-hours Phone:  [phone]     buPROPion 75 MG tablet                Primary Care Provider Office Phone # Fax #    Leatha Robles [phone]       [address] [address]        Equal Access to Services     JOE FORREST : Luis Enrique Alfred, waraymond mcgrath, qaybta kaallindsey max, emiliano gracia. So [facility] [place] [phone].    ATENCIÓN: Si habla español, tiene a hernandez disposición " servicios gratuitos de asistencia lingüística. Melodie carpio [phone].    We comply with applicable federal civil rights laws and [place] laws. We do not discriminate on the basis of race, color, national origin, age, disability, sex, sexual orientation, or gender identity.            Thank you!     Thank you for choosing [facility]  for your care. Our goal is always to provide you with excellent care. Hearing back from our patients is one way we can continue to improve our services. Please take a few minutes to complete the written survey that you may receive in the mail after your visit with us. Thank you!             Your Updated Medication List - Protect others around you: Learn how to safely use, store and throw away your medicines at www.disposemymeds.org.          This list is accurate as of 9/18/18 11:43 AM.  Always use your most recent med list.                   Brand Name Dispense Instructions for use Diagnosis    buPROPion 75 MG tablet    WELLBUTRIN    90 tablet    Start with one in the am, and if you tolerate, increase to 1 twice a day.    Adjustment disorder with depressed mood       DAILY MULTIVITAMIN PO      Take  by mouth.        FISH OIL PO      Take  by mouth.        fluticasone 50 MCG/ACT spray    FLONASE    3 Package    Spray 1 spray into both nostrils daily.    Seasonal allergies       triamcinolone 0.1 % cream    KENALOG     CHANELLE EXT AA BID        VITAMIN D PO      Take 5,000 Units by mouth.

## 2018-09-19 ENCOUNTER — RADIANT APPOINTMENT (OUTPATIENT)
Dept: MRI IMAGING | Facility: CLINIC | Age: 38
End: 2018-09-19
Attending: FAMILY MEDICINE
Payer: COMMERCIAL

## 2018-09-19 ENCOUNTER — THERAPY VISIT (OUTPATIENT)
Dept: PHYSICAL THERAPY | Facility: CLINIC | Age: 38
End: 2018-09-19
Payer: COMMERCIAL

## 2018-09-19 DIAGNOSIS — R39.89 BLADDER PAIN: Primary | ICD-10-CM

## 2018-09-19 DIAGNOSIS — N32.81 URGENCY-FREQUENCY SYNDROME: ICD-10-CM

## 2018-09-19 DIAGNOSIS — N94.10 FEMALE DYSPAREUNIA: ICD-10-CM

## 2018-09-19 DIAGNOSIS — N94.10 DYSPAREUNIA IN FEMALE: ICD-10-CM

## 2018-09-19 DIAGNOSIS — M25.561 RIGHT ANTERIOR KNEE PAIN: ICD-10-CM

## 2018-09-19 DIAGNOSIS — M99.05 SOMATIC DYSFUNCTION OF PELVIC REGION: ICD-10-CM

## 2018-09-19 PROCEDURE — 97112 NEUROMUSCULAR REEDUCATION: CPT | Mod: GP | Performed by: PHYSICAL THERAPIST

## 2018-09-19 PROCEDURE — 97110 THERAPEUTIC EXERCISES: CPT | Mod: GP | Performed by: PHYSICAL THERAPIST

## 2018-09-19 NOTE — MR AVS SNAPSHOT
After Visit Summary   9/19/2018    Janet Ryder    MRN: [id number]           Patient Information     Date Of Birth          1980        Visit Information        Provider Department      9/19/2018 8:50 AM Susana Ng, PT [facility] Physical [facility]        Today's Diagnoses     Bladder pain    -  1    Somatic dysfunction of pelvic region        Female dyspareunia        Urgency-frequency syndrome        Dyspareunia in female           Follow-ups after your visit        Your next 10 appointments already scheduled     Sep 19, 2018  4:00 PM CDT   MR KNEE RIGHT W/O CONTRAST with [id number]   [facility] MRI ([facility] [place])    [address] [address] [phone]           Take your medicines as usual, unless your doctor tells you not to. Bring a list of your current medicines to your exam (including vitamins, minerals and over-the-counter drugs). Also bring the results of similar scans you may have had.  Please remove any body piercings and hair extensions before you arrive.  Follow your doctor s orders. If you do not, we may have to postpone your exam.  You may or may not receive IV contrast for this exam pending the discretion of the Radiologist.  You do not need to do anything special to prepare.  The MRI machine uses a strong magnet. Please wear clothes without metal (snaps, zippers). A sweatsuit works well, or we may give you a hospital gown.   **IMPORTANT** THE INSTRUCTIONS BELOW ARE ONLY FOR THOSE PATIENTS WHO HAVE BEEN PRESCRIBED SEDATION OR GENERAL ANESTHESIA DURING THEIR MRI PROCEDURE:  IF YOUR DOCTOR PRESCRIBED ORAL SEDATION (take medicine to help you relax during your exam):   You must get the medicine from your doctor (oral medication) before you arrive. Bring the medicine to the exam. Do not take it at home. You ll be told when to take it upon arriving for your exam.   Arrive one  hour early. Bring someone who can take you home after the test. Your medicine will make you sleepy. After the exam, you may not drive, take a bus or take a taxi by yourself.  IF YOUR DOCTOR PRESCRIBED IV SEDATION:   Arrive one hour early. Bring someone who can take you home after the test. Your medicine will make you sleepy. After the exam, you may not drive, take a bus or take a taxi by yourself.   No eating 6 hours before your exam. You may have clear liquids up until 4 hours before your exam. (Clear liquids include water, clear tea, black coffee and fruit juice without pulp.)  IF YOUR DOCTOR PRESCRIBED ANESTHESIA (be asleep for your exam):   Arrive 1 1/2 hours early. Bring someone who can take you home after the test. You may not drive, take a bus or take a taxi by yourself.   No eating 8 hours before your exam. You may have clear liquids up until 4 hours before your exam. (Clear liquids include water, clear tea, black coffee and fruit juice without pulp.)   You will spend four to five hours in the recovery room.  Please call the Imaging Department at your exam site with any questions.            Sep 21, 2018  2:30 PM CDT   XR HIP CONTRAST CT/MR INJECTION with [id number],  IMAGING NURSE,  IR RAD   [facility] ([facility])    [address] [place] [address] [phone]           How do I prepare for my exam? (Food and drink instructions) No Food and Drink Restrictions.  How do I prepare for my exam? (Other instructions) * If you take Coumadin (or any other blood thinners) you may need to stop taking them up to 3 days prior to the exam. Talk to your doctor before stopping. * If you take Plavix, Ticlid, Pletal or Persantine, please ask your doctor if you should stop these medicines. You may need extra tests on the morning of your scan.  What should I wear: Wear comfortable clothes.  What should I bring: Please bring any scans or X-rays taken at  other hospitals, if similar tests were done. Also bring a list of your medicines, including vitamins, minerals and over-the-counter drugs. It is safest to leave personal items at home. Do I need a :  No  is needed.  What do I need to tell my doctor: Tell your doctor in advance: * If you are allergic to X-ray dye (contrast fluid). * If you may be pregnant.  What is this test: An arthrogram is an X-ray exam of a joint. We will take a set of X-ray pictures. We will then inject dye (contrast fluid) into the area around the joint. After that, we will take another set of X-ray pictures. The dye helps your doctor see the joint better on the X-rays.  Who should I call with questions: If you have any questions, please call the Imaging Department where you will have your exam. Directions, parking instructions, and other information is available on our website, [email or web address].            Sep 21, 2018  3:15 PM CDT   MR HIP RIGHT W CONTRAST with [id number]   [facility] MRI ([facility] [place])    [address] [place] [address] [phone]           Take your medicines as usual, unless your doctor tells you not to. Bring a list of your current medicines to your exam (including vitamins, minerals and over-the-counter drugs).  You may or may not receive intravenous (IV) contrast for this exam pending the discretion of the Radiologist.  You do not need to do anything special to prepare.  The MRI machine uses a strong magnet. Please wear clothes without metal (snaps, zippers). A sweatsuit works well, or we may give you a hospital gown.  Please remove any body piercings and hair extensions before you arrive. You will also remove watches, jewelry, hairpins, wallets, dentures, partial dental plates and hearing aids. You may wear contact lenses, and you may be able to wear your rings. We have a safe place to keep your personal items, but it is safer to leave them  at home.  **IMPORTANT** THE INSTRUCTIONS BELOW ARE ONLY FOR THOSE PATIENTS WHO HAVE BEEN PRESCRIBED SEDATION OR GENERAL ANESTHESIA DURING THEIR MRI PROCEDURE:  IF YOUR DOCTOR PRESCRIBED ORAL SEDATION (take medicine to help you relax during your exam):   You must get the medicine from your doctor (oral medication) before you arrive. Bring the medicine to the exam. Do not take it at home. You ll be told when to take it upon arriving for your exam.   Arrive one hour early. Bring someone who can take you home after the test. Your medicine will make you sleepy. After the exam, you may not drive, take a bus or take a taxi by yourself.  IF YOUR DOCTOR PRESCRIBED IV SEDATION:   Arrive one hour early. Bring someone who can take you home after the test. Your medicine will make you sleepy. After the exam, you may not drive, take a bus or take a taxi by yourself.   No eating 6 hours before your exam. You may have clear liquids up until 4 hours before your exam. (Clear liquids include water, clear tea, black coffee and fruit juice without pulp.)  IF YOUR DOCTOR PRESCRIBED ANESTHESIA (be asleep for your exam):   Arrive 1 1/2 hours early. Bring someone who can take you home after the test. You may not drive, take a bus or take a taxi by yourself.   No eating 8 hours before your exam. You may have clear liquids up until 4 hours before your exam. (Clear liquids include water, clear tea, black coffee and fruit juice without pulp.)   You will spend four to five hours in the recovery room.  Please call the Imaging Department at your exam site with any questions.            Sep 26, 2018 11:10 AM CDT   (Arrive by 10:55 AM)   Return Visit with Chica Valdez MD   [facility] ([facility] [place])    [address] [place] [address]   [phone]            Sep 27, 2018 11:00 AM CDT   Return Visit with Leatha Robles MD   [facility] ([facility])    [address],  [address]  [facility] [address]   [phone]            Oct 04, 2018  8:45 AM CDT   (Arrive by 8:30 AM)   Cystoscopy with Emily Snell MD   [facility] ([facility])    [address]  [address]  [facility] [address] [phone]            Nov 26, 2018  2:15 PM CST   (Arrive by 2:00 PM)   New Allergy with Christiano Zurita MD   [facility] ([facility])    [address]  [address]  [facility] [id number] [phone]           Do not take anti-histamines or Zantac for seven day prior to your appointment.              Who to contact     If you have questions or need follow up information about today's clinic visit or your schedule please contact [facility] directly at [phone].  Normal or non-critical lab and imaging results will be communicated to you by [employer], letter or phone within 4 business days after the clinic has received the results. If you do not hear from us within 7 days, please contact the clinic through [employer] or phone. If you have a critical or abnormal lab result, we will notify you by phone as soon as possible.  Submit refill requests through [employer] or call your pharmacy and they will forward the refill request to us. Please allow 3 business days for your refill to be completed.          Additional Information About Your Visit        [employer] Information     [employer] gives you secure access to your electronic health record. If you see a primary care provider, you can also send messages to your care team and make appointments. If you have questions, please call your primary care clinic.  If you do not have a primary care provider, please call [phone] and they will assist you.        Care EveryWhere ID     This is your Care EveryWhere  ID. This could be used by other organizations to access your [facility] medical records  [id number]         Blood Pressure from Last 3 Encounters:   09/18/18 114/70   09/04/18 123/86   06/28/18 107/65    Weight from Last 3 Encounters:   09/18/18 80.5 kg (177 lb 6.4 oz)   09/12/18 82.1 kg (181 lb)   09/04/18 82.1 kg (181 lb)              We Performed the Following     DANIELLE PROGRESS NOTES REPORT     NEUROMUSCULAR RE-EDUCATION     THERAPEUTIC EXERCISES        Primary Care Provider Office Phone # Fax #    Leatha Robles [phone]       [address] [address]        Equal Access to Services     JOE FORREST : Luis Enrique Alfred, waraymond mcgrath, qajuan f kaalmajeff max, emiliano gracia. So [facility] [phone].    ATENCIÓN: Si habla español, tiene a hernandez disposición servicios gratuitos de asistencia lingüística. [facility] [phone].    We comply with applicable federal civil rights laws and [place] laws. We do not discriminate on the basis of race, color, national origin, age, disability, sex, sexual orientation, or gender identity.            Thank you!     Thank you for choosing [facility]  for your care. Our goal is always to provide you with excellent care. Hearing back from our patients is one way we can continue to improve our services. Please take a few minutes to complete the written survey that you may receive in the mail after your visit with us. Thank you!             Your Updated Medication List - Protect others around you: Learn how to safely use, store and throw away your medicines at www.disposemymeds.org.          This list is accurate as of 9/19/18 10:09 AM.  Always use your most recent med list.                   Brand Name Dispense Instructions for use Diagnosis    buPROPion 75 MG tablet    WELLBUTRIN    90 tablet    Start with one in the am, and if you tolerate,  increase to 1 twice a day.    Adjustment disorder with depressed mood       DAILY MULTIVITAMIN PO      Take  by mouth.        FISH OIL PO      Take  by mouth.        fluticasone 50 MCG/ACT spray    FLONASE    3 Package    Spray 1 spray into both nostrils daily.    Seasonal allergies       triamcinolone 0.1 % cream    KENALOG     CHANELLE EXT AA BID        VITAMIN D PO      Take 5,000 Units by mouth.

## 2018-09-19 NOTE — PROGRESS NOTES
[facility]  System  Physical Exam  General   ROS      PROGRESS  REPORT    Progress reporting period is from 7/26/2018 to 9/19/2018.       SUBJECTIVE  Subjective: Pt is getting an MRI of knee and hip this week. She got dilators but hasn't used. She feels pain symptoms may be related to cycle as she has recently been tracking on her claudine    Initial Pain level: 8/10.   Changes in function:  Yes (See Goal flowsheet attached for changes in current functional level)  Adverse reaction to treatment or activity: None    OBJECTIVE  Changes noted in objective findings:  Yes, improving core and hip strength. Continues to have abdominal/pelvic pain symptoms but more around time of cycle  Objective: Progressed core/proximal mm strengthening program. Reviewed stretching program with dilator set     ASSESSMENT/PLAN  Updated problem list and treatment plan: Diagnosis 1:  Urgency-Frequency, myalgia of pelvic floor, dyspareunia   Pain -  hot/cold therapy, manual therapy, self management, education and home program  Decreased strength - therapeutic exercise, therapeutic activities and home program  Impaired muscle performance - biofeedback, neuro re-education and home program  Decreased function - therapeutic activities and home program  STG/LTGs have been met or progress has been made towards goals:  Yes (See Goal flow sheet completed today.)  Assessment of Progress: The patient's condition is improving.  Self Management Plans:  Patient has been instructed in a home treatment program.  I have re-evaluated this patient and find that the nature, scope, duration and intensity of the therapy is appropriate for the medical condition of the patient.  Janet continues to require the following intervention to meet STG and LTG's:  PT    Recommendations:  This patient would benefit from continued therapy.     Frequency:  1 X a month, once daily  Duration:  for 2 months        Please refer to the daily flowsheet for treatment today, total treatment  time and time spent performing 1:1 timed codes.

## 2018-09-21 ENCOUNTER — RADIANT APPOINTMENT (OUTPATIENT)
Dept: GENERAL RADIOLOGY | Facility: CLINIC | Age: 38
End: 2018-09-21
Attending: FAMILY MEDICINE
Payer: COMMERCIAL

## 2018-09-21 ENCOUNTER — RADIANT APPOINTMENT (OUTPATIENT)
Dept: MRI IMAGING | Facility: CLINIC | Age: 38
End: 2018-09-21
Attending: FAMILY MEDICINE
Payer: COMMERCIAL

## 2018-09-21 DIAGNOSIS — M25.551 RIGHT HIP PAIN: ICD-10-CM

## 2018-09-21 LAB — RADIOLOGIST FLAGS: NORMAL

## 2018-09-21 RX ORDER — IOPAMIDOL 408 MG/ML
20 INJECTION, SOLUTION INTRATHECAL ONCE
Status: COMPLETED | OUTPATIENT
Start: 2018-09-21 | End: 2018-09-21

## 2018-09-21 RX ORDER — LIDOCAINE HYDROCHLORIDE 10 MG/ML
30 INJECTION, SOLUTION EPIDURAL; INFILTRATION; INTRACAUDAL; PERINEURAL ONCE
Status: COMPLETED | OUTPATIENT
Start: 2018-09-21 | End: 2018-09-21

## 2018-09-21 RX ADMIN — IOPAMIDOL 7 ML: 408 INJECTION, SOLUTION INTRATHECAL at 15:13

## 2018-09-21 RX ADMIN — LIDOCAINE HYDROCHLORIDE 10 ML: 10 INJECTION, SOLUTION EPIDURAL; INFILTRATION; INTRACAUDAL; PERINEURAL at 15:13

## 2018-09-26 ENCOUNTER — RADIANT APPOINTMENT (OUTPATIENT)
Dept: ULTRASOUND IMAGING | Facility: CLINIC | Age: 38
End: 2018-09-26
Attending: FAMILY MEDICINE
Payer: COMMERCIAL

## 2018-09-26 ENCOUNTER — OFFICE VISIT (OUTPATIENT)
Dept: ORTHOPEDICS | Facility: CLINIC | Age: 38
End: 2018-09-26
Payer: COMMERCIAL

## 2018-09-26 VITALS — WEIGHT: 177 LBS | BODY MASS INDEX: 27.78 KG/M2 | RESPIRATION RATE: 16 BRPM | HEIGHT: 67 IN

## 2018-09-26 DIAGNOSIS — M25.551 RIGHT HIP PAIN: Primary | ICD-10-CM

## 2018-09-26 DIAGNOSIS — M24.151 DEGENERATIVE TEAR OF ACETABULAR LABRUM OF RIGHT HIP: ICD-10-CM

## 2018-09-26 DIAGNOSIS — N83.201 CYST OF RIGHT OVARY: ICD-10-CM

## 2018-09-26 DIAGNOSIS — M62.81 QUADRICEPS WEAKNESS: ICD-10-CM

## 2018-09-26 NOTE — PROGRESS NOTES
" Subjective:   Janet Ryder is a 38 year old female who is f/u on her MRIs.  Biking or walking, in front or inner hip.  Had for a long time, since a teen.  Working on swimming and PT  MRIs done  Right knee pain- feeling stronger, hurts with straightening, worse with turns, feels loose and painful.  Happened in May, taking it easy.    Date last seen: 9/12/2018    PAST MEDICAL, SOCIAL, SURGICAL AND FAMILY HISTORY: She  has a past medical history of Atypical migraine.  She  has a past surgical history that includes NONSPECIFIC PROCEDURE (AGE 6, AGE 10); NONSPECIFIC PROCEDURE (AGE 22); and orthopedic surgery.  Her family history includes Alcohol/Drug in her father; Aneurysm in her maternal uncle; Cancer in her maternal grandfather; Cancer - colorectal in her maternal grandfather; Cerebrovascular Disease in her paternal grandfather; GASTROINTESTINAL DISEASE in her maternal grandmother; Glaucoma in her maternal grandfather; Hypertension in her mother; Lung Cancer in her mother; Ovarian Cancer in her mother; Respiratory in her maternal grandfather.  She reports that she has never smoked. She has never used smokeless tobacco. She reports that she drinks alcohol. She reports that she does not use illicit drugs.    ALLERGIES: She is allergic to shellfish-derived products; amoxicillin; erythromycin; no clinical screening - see comments; and scopolamine.    CURRENT MEDICATIONS: She has a current medication list which includes the following prescription(s): bupropion, cholecalciferol, fluticasone, multiple vitamins-minerals, omega-3 fatty acids, and triamcinolone.     REVIEW OF SYSTEMS: 10 point review of systems is negative except as noted above.     Exam:   Resp 16  Ht 5' 6.73\" (1.695 m)  Wt 177 lb (80.3 kg)  BMI 27.95 kg/m2           CONSTITUTIONAL: healthy, alert, no distress and cooperative  HEAD: Normocephalic. No masses, lesions, tenderness or abnormalities  SKIN: no suspicious lesions or rashes  GAIT: " normal  NEUROLOGIC: Non-focal  PSYCHIATRIC: affect normal/bright and mentation appears normal.    MUSCULOSKELETAL: right hip and knee pain  Palpation: Tender:   Right anterior hip, RLQ  Non-tender:  left greater trochanter, right greater trochanter, left ASIS, right ASIS, left proximal hamstring attachment, right proximal hamstring attachment  Range of Motion:  Full ROM, both hips, right hip painful with IR  Strength:  full strength  Special tests:  negative Aleshia's, IT band tightness/pain  Right, logrolling negative, NORMA negative      Inspection:       AP/lateral alignment normal  Tender: quad muscle, fat pad      Non-tender: patellar facets, MCL, LCL, lateral joint line, medial joint line, IT band, posterior knee   Active Range of Motion: all normal  Strength: quad  4/5, Hamstrings  5/5, Gastroc  5/5, Tibialis anterior  5/5, Peroneals  5/5 and core strength  5/5 hip abductors and other core muscles   Special tests: normal Valgus stress test, normal Varus, negative Lachman's test,  negative Lynn's, no apprehension with lateral stress of the patella.         Assessment/Plan:   Pt is a 37 yo white female with PMHx of atypical migraine presenting with right knee and right hip pain  1. Right knee pain- quad tear noted on MRI and PF tracking symptoms, no medial pain so pes anserine bursitis less likely cause of her pain, PT- Susana ARANA- communicated MRI findings  2. Right hip labral tear- PT, if not improving consider US guided HIP injection  3. Edematous ovary- pelvic US recommended by Radiology, pt has appt with PCP tomorrow, hoping results will be available for PCP, this communicated to PCP  RTC 6 weeks  Encounter Diagnoses   Name Primary?     Right hip pain Yes     Cyst of right ovary      Quadriceps weakness      Degenerative tear of acetabular labrum of right hip        X-RAY INTERPRETATION:   MRI hip, right  Impression:  1. 10:00 to 2:00 acetabular labral tearing.  2. Right ovary look relatively prominent and  edematous, but  incompletely assessed on this non-dedicated study. Consider pelvic  Ultrasound.  MRI knee, right  Impression:  1.  Approximately 5 mm mediolateral dimension, small focal area of  moderate grade partial thickness tearing at the central aspect of  quadriceps tendon at the patellar attachment.  2. Trace pes anserine bursal fluid, query mild bursitis.  3. 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  4. Intact menisci.

## 2018-09-26 NOTE — LETTER
"  9/26/2018      RE: Janet Ryder  [address]  [facility] [address]        Subjective:   Janet Ryder is a 38 year old female who is f/u on her MRIs.  Biking or walking, in front or inner hip.  Had for a long time, since a teen.  Working on swimming and PT  MRIs done  Right knee pain- feeling stronger, hurts with straightening, worse with turns, feels loose and painful.  Happened in May, taking it easy.    Date last seen: 9/12/2018    PAST MEDICAL, SOCIAL, SURGICAL AND FAMILY HISTORY: She  has a past medical history of Atypical migraine.  She  has a past surgical history that includes NONSPECIFIC PROCEDURE (AGE 6, AGE 10); NONSPECIFIC PROCEDURE (AGE 22); and orthopedic surgery.  Her family history includes Alcohol/Drug in her father; Aneurysm in her maternal uncle; Cancer in her maternal grandfather; Cancer - colorectal in her maternal grandfather; Cerebrovascular Disease in her paternal grandfather; GASTROINTESTINAL DISEASE in her maternal grandmother; Glaucoma in her maternal grandfather; Hypertension in her mother; Lung Cancer in her mother; Ovarian Cancer in her mother; Respiratory in her maternal grandfather.  She reports that she has never smoked. She has never used smokeless tobacco. She reports that she drinks alcohol. She reports that she does not use illicit drugs.    ALLERGIES: She is allergic to shellfish-derived products; amoxicillin; erythromycin; no clinical screening - see comments; and scopolamine.    CURRENT MEDICATIONS: She has a current medication list which includes the following prescription(s): bupropion, cholecalciferol, fluticasone, multiple vitamins-minerals, omega-3 fatty acids, and triamcinolone.     REVIEW OF SYSTEMS: 10 point review of systems is negative except as noted above.     Exam:   Resp 16  Ht 5' 6.73\" (1.695 m)  Wt 177 lb (80.3 kg)  BMI 27.95 kg/m2           CONSTITUTIONAL: healthy, alert, no distress and cooperative  HEAD: Normocephalic. No masses, lesions, " tenderness or abnormalities  SKIN: no suspicious lesions or rashes  GAIT: normal  NEUROLOGIC: Non-focal  PSYCHIATRIC: affect normal/bright and mentation appears normal.    MUSCULOSKELETAL: right hip and knee pain  Palpation: Tender:   Right anterior hip, RLQ  Non-tender:  left greater trochanter, right greater trochanter, left ASIS, right ASIS, left proximal hamstring attachment, right proximal hamstring attachment  Range of Motion:  Full ROM, both hips, right hip painful with IR  Strength:  full strength  Special tests:  negative Aleshia's, IT band tightness/pain  Right, logrolling negative, NORMA negative      Inspection:       AP/lateral alignment normal  Tender: quad muscle, fat pad      Non-tender: patellar facets, MCL, LCL, lateral joint line, medial joint line, IT band, posterior knee   Active Range of Motion: all normal  Strength: quad  4/5, Hamstrings  5/5, Gastroc  5/5, Tibialis anterior  5/5, Peroneals  5/5 and core strength  5/5 hip abductors and other core muscles   Special tests: normal Valgus stress test, normal Varus, negative Lachman's test,  negative Lynn's, no apprehension with lateral stress of the patella.         Assessment/Plan:   Pt is a 37 yo white female with PMHx of atypical migraine presenting with right knee and right hip pain  1. Right knee pain- quad tear noted on MRI and PF tracking symptoms, no medial pain so pes anserine bursitis less likely cause of her pain, PT- Susana ARANA- communicated MRI findings  2. Right hip labral tear- PT, if not improving consider US guided HIP injection  3. Edematous ovary- pelvic US recommended by Radiology, pt has appt with PCP tomorrow, hoping results will be available for PCP, this communicated to PCP  RTC 6 weeks  Encounter Diagnoses   Name Primary?     Right hip pain Yes     Cyst of right ovary      Quadriceps weakness      Degenerative tear of acetabular labrum of right hip        X-RAY INTERPRETATION:   MRI hip, right  Impression:  1. 10:00 to 2:00  acetabular labral tearing.  2. Right ovary look relatively prominent and edematous, but  incompletely assessed on this non-dedicated study. Consider pelvic  Ultrasound.  MRI knee, right  Impression:  1.  Approximately 5 mm mediolateral dimension, small focal area of  moderate grade partial thickness tearing at the central aspect of  quadriceps tendon at the patellar attachment.  2. Trace pes anserine bursal fluid, query mild bursitis.  3. 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  4. Intact menisci.    Chica Valdez MD

## 2018-09-26 NOTE — MR AVS SNAPSHOT
After Visit Summary   9/26/2018    Janet Ryder    MRN: [id number]           Patient Information     Date Of Birth          1980        Visit Information        Provider Department      9/26/2018 11:10 AM Chica Valdez MD [facility]        Today's Diagnoses     Right hip pain    -  1    Cyst of right ovary        Quadriceps weakness        Degenerative tear of acetabular labrum of right hip           Follow-ups after your visit        Follow-up notes from your care team     Return in about 6 weeks (around 11/7/2018), or if symptoms worsen or fail to improve.      Your next 10 appointments already scheduled     Sep 27, 2018 11:00 AM CDT   Return Visit with Leatha Robles MD   [facility] [place] [facility] ([facility])    [address],  [address]  [facility] [address]   [phone]            Oct 04, 2018  8:45 AM CDT   (Arrive by 8:30 AM)   Cystoscopy with Emily Snell MD   [facility] and [facility] ([facility])    [address] [place] [address]  4th [facility] [address] [phone]            Nov 26, 2018  2:15 PM CST   (Arrive by 2:00 PM)   New Allergy with Christiano Zurita MD   [facility] ([facility])    [address] [place] [address]  [address] [place] [address] [phone]           Do not take anti-histamines or Zantac for seven day prior to your appointment.              Who to contact     Please call your clinic at [phone] to:    Ask questions about your health    Make or cancel appointments    Discuss your medicines    Learn about your test results    Speak to your doctor            Additional Information About Your Visit        MyChart Information     CheckPoint HRt gives you secure access to your electronic health record. If you see a primary care provider, you can also send messages to your care  "team and make appointments. If you have questions, please call your primary care clinic.  If you do not have a primary care provider, please call [phone] and they will assist you.      YesGraph is an electronic gateway that provides easy, online access to your medical records. With YesGraph, you can request a clinic appointment, read your test results, renew a prescription or communicate with your care team.     To access your existing account, please contact your [facility] or call [phone] for assistance.        Care EveryWhere ID     This is your Care EveryWhere ID. This could be used by other organizations to access your [facility] medical records  [id number]        Your Vitals Were     Respirations Height BMI (Body Mass Index)             16 5' 6.73\" (1.695 m) 27.95 kg/m2          Blood Pressure from Last 3 Encounters:   09/18/18 114/70   09/04/18 123/86   06/28/18 107/65    Weight from Last 3 Encounters:   09/26/18 177 lb (80.3 kg)   09/18/18 177 lb 6.4 oz (80.5 kg)   09/12/18 181 lb (82.1 kg)               Primary Care Provider Office Phone # Fax #    Leatha Robles [phone]       [address] [address]        Equal Access to Services     SILVIA FORREST : Hadii wayne ku hadasho Soomaali, waaxda luqadaha, qaybta kaalmada adeegyada, emiliano schwartz . So [facility] [phone].    ATENCIÓN: Si habla español, tiene a hernandez disposición servicios gratuitos de asistencia lingüística. Llame al [phone].    We comply with applicable federal civil rights laws and [place] laws. We do not discriminate on the basis of race, color, national origin, age, disability, sex, sexual orientation, or gender identity.            Thank you!     Thank you for choosing [facility]  for your care. Our goal is always to provide you with excellent care. Hearing back from our patients is one way we can continue to improve " our services. Please take a few minutes to complete the written survey that you may receive in the mail after your visit with us. Thank you!             Your Updated Medication List - Protect others around you: Learn how to safely use, store and throw away your medicines at www.disposemymeds.org.          This list is accurate as of 9/26/18 12:23 PM.  Always use your most recent med list.                   Brand Name Dispense Instructions for use Diagnosis    buPROPion 75 MG tablet    WELLBUTRIN    90 tablet    Start with one in the am, and if you tolerate, increase to 1 twice a day.    Adjustment disorder with depressed mood       DAILY MULTIVITAMIN PO      Take  by mouth.        FISH OIL PO      Take  by mouth.        fluticasone 50 MCG/ACT spray    FLONASE    3 Package    Spray 1 spray into both nostrils daily.    Seasonal allergies       triamcinolone 0.1 % cream    KENALOG     CHANELLE EXT AA BID        VITAMIN D PO      Take 5,000 Units by mouth.

## 2018-09-27 ENCOUNTER — OFFICE VISIT (OUTPATIENT)
Dept: FAMILY MEDICINE | Facility: CLINIC | Age: 38
End: 2018-09-27
Payer: COMMERCIAL

## 2018-09-27 VITALS
RESPIRATION RATE: 16 BRPM | HEART RATE: 67 BPM | BODY MASS INDEX: 28.33 KG/M2 | TEMPERATURE: 98.6 F | WEIGHT: 179.4 LBS | OXYGEN SATURATION: 99 % | DIASTOLIC BLOOD PRESSURE: 69 MMHG | SYSTOLIC BLOOD PRESSURE: 105 MMHG

## 2018-09-27 DIAGNOSIS — G50.0 TRIGEMINAL NEURALGIA OF LEFT SIDE OF FACE: ICD-10-CM

## 2018-09-27 DIAGNOSIS — M25.50 PAIN IN JOINT, MULTIPLE SITES: ICD-10-CM

## 2018-09-27 DIAGNOSIS — R21 RASH: ICD-10-CM

## 2018-09-27 DIAGNOSIS — S73.191A TEAR OF RIGHT ACETABULAR LABRUM, INITIAL ENCOUNTER: ICD-10-CM

## 2018-09-27 DIAGNOSIS — Z87.898 HISTORY OF ELEVATED ANTINUCLEAR ANTIBODY (ANA): Primary | ICD-10-CM

## 2018-09-27 NOTE — PROGRESS NOTES
CHARO       Janet is a 38 year old  female  who presents :    Chief Complaint   Patient presents with     RECHECK     follow up      Follow up   For a partial Quad tear - has had it since May.  What can she do with it.  Can she swim. ? Pool therapy.     RA - appointment. Can she be seen sooner.     What she is most worried about on her forehead and left side of face hurts. Is worried that she has trouble with word finding. Her  will at times make fun of her not remembering things.  Cannot get rid of her headache - does not ever go away.  No different in the am.     Wellbutrin - got her period early.  Taking - makes her feel a bit more anxious.  It also feels like a bit of sudafed.  Has a bit more energy but still all tired all the time.   Taking a huge amount of ibuprofen but has a headache.     Sinus - flonase regularly. At times will take sudafed and that helps sinus pressure in both frontal areas. At times will have congestion.  Not much drainage.          Patient Active Problem List   Diagnosis     iamc SPRAIN SACROILIAC     iamc SPRAIN THORACIC REGION     Lumbosacral ligament sprain     iamc OTHER BACK SYMPTOMS     Contraception     CARDIOVASCULAR SCREENING; LDL GOAL LESS THAN 160     Abdominal pain     Seasonal allergies     Atypical migraine     Rosacea     Iron deficiency     Interstitial cystitis     Family history of malignant neoplasm of ovary     Acute pain of right knee     Bladder pain     Urgency-frequency syndrome     Dyspareunia in female     Osteoarthritis resulting from right hip dysplasia     Somatic dysfunction of pelvic region     Female dyspareunia       Current Outpatient Prescriptions   Medication Sig Dispense Refill     buPROPion (WELLBUTRIN) 75 MG tablet Start with one in the am, and if you tolerate, increase to 1 twice a day. 90 tablet 0     Cholecalciferol (VITAMIN D PO) Take 5,000 Units by mouth.       fluticasone (FLONASE) 50 MCG/ACT nasal spray Spray 1 spray into both  nostrils daily. 3 Package 3     Multiple Vitamin (DAILY MULTIVITAMIN PO) Take  by mouth.       Omega-3 Fatty Acids (FISH OIL PO) Take  by mouth.       triamcinolone (KENALOG) 0.1 % cream CHANELLE EXT AA BID  2          Allergies   Allergen Reactions     Shellfish-Derived Products Hives     Amoxicillin      Erythromycin      No Clinical Screening - See Comments Nausea and Vomiting     Eggplant     Scopolamine      Redness and swelling of the face                Review of Systems:               Physical Exam:     Vitals:    09/27/18 1109   BP: 105/69   Pulse: 67   Resp: 16   Temp: 98.6  F (37  C)   TempSrc: Oral   SpO2: 99%   Weight: 179 lb 6.4 oz (81.4 kg)     Body mass index is 28.33 kg/(m^2).  Vitals were reviewed and were normal  GENERAL: healthy, alert, well nourished, well hydrated, no distress  HENT: ear canals- normal; TMs- normal; Nose- normal; Mouth- no ulcers, no lesions  EYES: PERRLA, nl CN2 - 12, no hyperesthesia over face. Fundi - nl on right, more difficult to assess on the left.   NECK: no tenderness, no adenopathy, no asymmetry, no masses, no stiffness; thyroid- normal to palpation    Radiant Appointment on 09/21/2018   Component Date Value Ref Range Status     Radiologist flags 09/21/2018 Pelvic ultrasound.   Final         Assessment and Plan     Janet was seen today for recheck.    Diagnoses and all orders for this visit:    History of elevated antinuclear antibody (AUGUSTIN) - has possible rheum disorder with mildly abnl AUGUSTIN, flushing rash over elbows and ant chest, and joint arthralgia. Is aware that her right hip has labrum tear, so different cause and will be doing PT.   -     RHEUMATOLOGY REFERRAL - EXTERNAL    Rash  -     RHEUMATOLOGY REFERRAL - EXTERNAL    Pain in joint, multiple sites  -     RHEUMATOLOGY REFERRAL - EXTERNAL    Tear of right acetabular labrum, initial encounter - PT and OKed swimming and water aerobics. For the quad tear, to use roller and pool.     Trigeminal neuralgia of left side  of face - possible. Has associated left blurry vision at times, but no rhinnorhea or conjuntivitis. She doesn't fully endorse sinus symptoms although notes some of these as well. She now notes difficulty with word recall but not sure how much of this is her anxiety, Will continue with the wellbutrin for now, reassess and then stop if no improvement in anxiety and change to Cymbalta to also help manage her pain. Also consider gabapentin post MRI  -     MR Brain w/o & w Contrast; Future    Total time: 30  minutes with more than half spent counseling on all the above.     There are no discontinued medications.    Options for treatment and follow-up care were reviewed with the patient . Janet Ryder  engaged in the decision making process and verbalized understanding of the options discussed and agreed with the final plan.    Leatha Robles MD

## 2018-09-27 NOTE — MR AVS SNAPSHOT
After Visit Summary   9/27/2018    Janet Ryder    MRN: [id number]           Patient Information     Date Of Birth          1980        Visit Information        Provider Department      9/27/2018 11:00 AM Leatha Robles MD [facility]        Today's Diagnoses     History of elevated antinuclear antibody (AUGUSTIN)    -  1    Rash        Pain in joint, multiple sites          Care Instructions    [facility]  [address]  [address]  [facility] [address]  [phone]  10/29/18 @ 10:30a.m with     -----------------------------------------------  Here is the plan from today's visit    1. History of elevated antinuclear antibody (AUGUSTIN) - I will get back to you if we can get you into the U  - RHEUMATOLOGY REFERRAL - EXTERNAL    2. Rash  - RHEUMATOLOGY REFERRAL - EXTERNAL    3. Pain in joint, multiple sites    - RHEUMATOLOGY REFERRAL - EXTERNAL    I will get back to you on the option of an MRI    Please call or return to clinic if your symptoms don't go away.    Follow up plan    Thank you for coming to [facility] today.  Lab Testing:  **If you had lab testing today and your results are reassuring or normal they will be mailed to you or sent through [employer] within 7 days.   **If the lab tests need quick action we will call you with the results.  The phone number we will call with results is # [phone] (home) . If this is not the best number please call our clinic and change the number.  Medication Refills:  If you need any refills please call your pharmacy and they will contact us.   If you need to  your refill at a new pharmacy, please contact the new pharmacy directly. The new pharmacy will help you get your medications transferred faster.   Scheduling:  If you have any concerns about today's visit or wish to schedule another appointment please call our office during normal business hours [phone] (8-5:00 M-F)  If a referral  was made to a [facility] and you don't get a call from central scheduling please call [phone].  If a Mammogram was ordered for you at [facility] call [phone] to schedule or change your appointment.  If you had an XRay/CT/Ultrasound/MRI ordered the number is [phone] to schedule or change your radiology appointment.   Medical Concerns:  If you have urgent medical concerns please call [phone] at any time of the day.  If you have a medical emergency please call [phone].              Follow-ups after your visit        Additional Services     RHEUMATOLOGY REFERRAL - EXTERNAL       Patient requests assistance from the Care Coordinator to schedule this appointment    Patient with longstanding arthralgias, some warmth on exam but no xray changes or bogginess/swelling who has fleeting rash (blushing around her elbows, over her anterior chest and forehead that can be painful) and ongoing fatigue.  Seeing Sports med for right hip labial tear but needs to be ruled out for rheumatologic disorder. AUGUSTIN 1:40 and speckled but otherwise routine inflammatory labs negative, basic CBC/CMP and UA neg. Did not do other rheum labs.       At this location [place]                  Your next 10 appointments already scheduled     Oct 04, 2018  8:45 AM CDT   (Arrive by 8:30 AM)   Cystoscopy with Emily Snell MD   [facility] ([facility] [place])    [address] [place] [address]  4th Floor  [facility] [address] [phone]            Nov 26, 2018  2:15 PM CST   (Arrive by 2:00 PM)   New Allergy with Christiano Zurita MD   [facility] ([facility] [place])    [address] [place] [address]  [address] [place] [address] [phone]           Do not take anti-histamines or Zantac for seven day prior to your appointment.              Who to contact     Please call your clinic  at [phone] to:    Ask questions about your health    Make or cancel appointments    Discuss your medicines    Learn about your test results    Speak to your doctor            Additional Information About Your Visit        4th aspectharRxRevu Information     PHARMAJET gives you secure access to your electronic health record. If you see a primary care provider, you can also send messages to your care team and make appointments. If you have questions, please call your primary care clinic.  If you do not have a primary care provider, please call [phone] and they will assist you.      PHARMAJET is an electronic gateway that provides easy, online access to your medical records. With PHARMAJET, you can request a clinic appointment, read your test results, renew a prescription or communicate with your care team.     To access your existing account, please contact your [facility] or call [phone] for assistance.        Care EveryWhere ID     This is your Care EveryWhere ID. This could be used by other organizations to access your [facility] medical records  [id number]        Your Vitals Were     Pulse Temperature Respirations Last Period Pulse Oximetry Breastfeeding?    67 98.6  F (37  C) (Oral) 16 09/24/2018 99% No    BMI (Body Mass Index)                   28.33 kg/m2            Blood Pressure from Last 3 Encounters:   09/27/18 105/69   09/18/18 114/70   09/04/18 123/86    Weight from Last 3 Encounters:   09/27/18 179 lb 6.4 oz (81.4 kg)   09/26/18 177 lb (80.3 kg)   09/18/18 177 lb 6.4 oz (80.5 kg)              We Performed the Following     RHEUMATOLOGY REFERRAL - EXTERNAL        Primary Care Provider Office Phone # Fax #    Leatha Robles [phone]       [address] [address]        Equal Access to Services     JOE FORREST : Luis Enrique Alfred, karley mcgrath, emiliano cantu. So  [facility] [phone].    ATENCIÓN: Si ginette feng, tiene a hernandez disposición servicios gratuitos de asistencia lingüística. Melodie carpio [phone].    We comply with applicable federal civil rights laws and [place] laws. We do not discriminate on the basis of race, color, national origin, age, disability, sex, sexual orientation, or gender identity.            Thank you!     Thank you for choosing \[facility]\"" [facility]  for your care. Our goal is always to provide you with excellent care. Hearing back from our patients is one way we can continue to improve our services. Please take a few minutes to complete the written survey that you may receive in the mail after your visit with us. Thank you!             Your Updated Medication List - Protect others around you: Learn how to safely use, store and throw away your medicines at www.disposemymeds.org.          This list is accurate as of 9/27/18 11:54 AM.  Always use your most recent med list.                   Brand Name Dispense Instructions for use Diagnosis    buPROPion 75 MG tablet    WELLBUTRIN    90 tablet    Start with one in the am, and if you tolerate, increase to 1 twice a day.    Adjustment disorder with depressed mood       DAILY MULTIVITAMIN PO      Take  by mouth.        FISH OIL PO      Take  by mouth.        fluticasone 50 MCG/ACT spray    FLONASE    3 Package    Spray 1 spray into both nostrils daily.    Seasonal allergies       triamcinolone 0.1 % cream    KENALOG     CHANELLE EXT AA BID        VITAMIN D PO      Take 5,000 Units by mouth.

## 2018-09-27 NOTE — PATIENT INSTRUCTIONS
[facility]  [address]  [address]  [place] [address]  [phone]  10/29/18 @ 10:30a.m with     -----------------------------------------------  Here is the plan from today's visit    1. History of elevated antinuclear antibody (AUGUSTIN) - I will get back to you if we can get you into the U  - RHEUMATOLOGY REFERRAL - EXTERNAL    2. Rash  - RHEUMATOLOGY REFERRAL - EXTERNAL    3. Pain in joint, multiple sites    - RHEUMATOLOGY REFERRAL - EXTERNAL    I will get back to you on the option of an MRI    Please call or return to clinic if your symptoms don't go away.    Follow up plan    Thank you for coming to [facility] today.  Lab Testing:  **If you had lab testing today and your results are reassuring or normal they will be mailed to you or sent through [employer] within 7 days.   **If the lab tests need quick action we will call you with the results.  The phone number we will call with results is # [phone] (home) . If this is not the best number please call our clinic and change the number.  Medication Refills:  If you need any refills please call your pharmacy and they will contact us.   If you need to  your refill at a new pharmacy, please contact the new pharmacy directly. The new pharmacy will help you get your medications transferred faster.   Scheduling:  If you have any concerns about today's visit or wish to schedule another appointment please call our office during normal business hours [phone] (8-5:00 M-F)  If a referral was made to a [facility] and you don't get a call from central scheduling please call [phone].  If a Mammogram was ordered for you at [facility] call [phone] to schedule or change your appointment.  If you had an XRay/CT/Ultrasound/MRI ordered the number is [phone] to schedule or change your radiology appointment.   Medical Concerns:  If you have urgent medical concerns please call  [phone] at any time of the day.  If you have a medical emergency please call 911.

## 2018-09-28 ENCOUNTER — PRE VISIT (OUTPATIENT)
Dept: UROLOGY | Facility: CLINIC | Age: 38
End: 2018-09-28

## 2018-09-28 NOTE — TELEPHONE ENCOUNTER
Reason for visit: repeat cystoscopy     Relevant information: small hypervascular lesions noted on the posterior wall of the bladder     Records/imaging/labs: all records available    Pt called: no need for a call    Rooming: regular

## 2018-10-04 ENCOUNTER — OFFICE VISIT (OUTPATIENT)
Dept: UROLOGY | Facility: CLINIC | Age: 38
End: 2018-10-04
Payer: COMMERCIAL

## 2018-10-04 VITALS
HEART RATE: 68 BPM | WEIGHT: 178 LBS | SYSTOLIC BLOOD PRESSURE: 108 MMHG | DIASTOLIC BLOOD PRESSURE: 75 MMHG | HEIGHT: 66 IN | BODY MASS INDEX: 28.61 KG/M2

## 2018-10-04 DIAGNOSIS — M62.89 PELVIC FLOOR DYSFUNCTION: ICD-10-CM

## 2018-10-04 DIAGNOSIS — M79.18 MYALGIA OF PELVIC FLOOR: ICD-10-CM

## 2018-10-04 DIAGNOSIS — N32.9 LESION OF BLADDER: Primary | ICD-10-CM

## 2018-10-04 DIAGNOSIS — R39.89 BLADDER PAIN: ICD-10-CM

## 2018-10-04 LAB
APPEARANCE UR: CLEAR
BILIRUB UR QL: NORMAL
COLOR UR: YELLOW
GLUCOSE URINE: NORMAL MG/DL
HGB UR QL: NORMAL
KETONES UR QL: NORMAL MG/DL
LEUKOCYTE ESTERASE URINE: NORMAL
NITRITE UR QL STRIP: NORMAL
PH UR STRIP: 6.5 PH (ref 5–7)
PROTEIN ALBUMIN URINE: NORMAL MG/DL
SOURCE: NORMAL
SP GR UR STRIP: 1.02 (ref 1–1.03)
UROBILINOGEN UR QL STRIP: 0.2 EU/DL (ref 0.2–1)

## 2018-10-04 ASSESSMENT — PAIN SCALES - GENERAL: PAINLEVEL: SEVERE PAIN (6)

## 2018-10-04 NOTE — NURSING NOTE
Chief Complaint   Patient presents with     Cystoscopy     small hypervascular lesions noted on the posterior wall of the bladder        Barbara Watkins MA

## 2018-10-04 NOTE — PROGRESS NOTES
"October 4, 2018    Return visit    Patient returns today for follow up for cystoscopy to assess for stability of a hypervascular lesion noted on her last cystoscopy in June.  She denies any changes in her health since last visit.    /75  Pulse 68  Ht 1.676 m (5' 6\")  Wt 80.7 kg (178 lb)  LMP 09/24/2018  BMI 28.73 kg/m2  She is comfortable, in no distress, non-labored breathing.  Abdomen is soft, non-tender, non-distended.  Normal external female genitalia.  Negative CST.  Pelvic exam is remarkable for myofascial tenderness of the pelvic floor.    Cystoscopy Note: After informed consent was obtained patient was prepped and draped in the standard fashion.  The flexible cystoscope was inserted into a normal appearing urethral meatus.  The urothelium was carefully examined and there were no obvious tumors, masses, stones, foreign bodies, or other urothelial abnormalities noted.  Bilateral ureteral orifices were noted in the normal orthotopic position and both effluxed clear urine.  The cystoscope was retroflexed and the bladder neck was unremarkable.  The urethra was carefully examined upon removing the cystoscope and was unremarkable.  Patient tolerated the procedure without complications noted.      A/P: 38 year old F with history of hip dysplasia and right knee pain with urinary urgency frequency, bladder pain, dyspareunia, myofascial tenderness of the pelvic floor, pelvic floor dysfunction    Continue pelvic floor therapy    Trial of vaginal valium    RTC 3-4 months, sooner if needed    Emily Snell MD MPH   of Urology    CC  Patient Care Team:  Leatha Robles MD as PCP - General (Family Practice)  Emily Snell MD as MD (Urology)  Abbey Watt, RN as Registered Nurse (Urology)  REEMA LÓPEZ                  "

## 2018-10-04 NOTE — NURSING NOTE
Invasive Procedure Safety Checklist:    Procedure: cysto     Action: Complete sections and checkboxes as appropriate.    Pre-procedure:  1. Patient ID Verified with 2 identifiers (Nida and  or MRN) : YES    2. Procedure and site verified with patient/designee (when able) : YES    3. Accurate consent documentation in medical record : YES    4. H&P (or appropriate assessment) documented in medical record : NO  H&P must be up to 30 days prior to procedure an updated within 24 hours of                 Procedure as applicable.     5. Relevant diagnostic and radiology test results appropriately labeled and displayed as applicable : NO    6. Blood products, implants, devices, and/or special equipment available for the procedure as applicable : NO    7. Procedure site(s) marked with provider initials [Exclusions: none] : NO    8. Marking not required. Reason : Yes  Procedure does not require site marking    Time Out:     Time-Out performed immediately prior to starting procedure, including verbal and active participation of all team members addressing: YES    1. Correct patient identity.  2. Confirmed that the correct side and site are marked.  3. An accurate procedure to be done.  4. Agreement on the procedure to be done.  5. Correct patient position.  6. Relevant images and results are properly labeled and appropriately displayed.  7. The need to administer antibiotics or fluids for irrigation purposes during the procedure as applicable.  8. Safety precautions based on patient history or medication use.    During Procedure: Verification of correct person, site, and procedure occurs any time the responsibility for care of the patient is transferred to another member of the care team.

## 2018-10-04 NOTE — NURSING NOTE
The following medication was given:     MEDICATION:  Lidocaine   ROUTE: topical   SITE: urethra   DOSE:20mg  LOT #:  [id number]  : limited   EXPIRATION DATE: 5/20  NDC#: [id number]   Was there drug waste? Tracy Watkins CMA  October 4, 2018

## 2018-10-04 NOTE — MR AVS SNAPSHOT
"              After Visit Summary   10/4/2018    Janet Ryder    MRN: [id number]           Patient Information     Date Of Birth          1980        Visit Information        Provider Department      10/4/2018 8:45 AM Emily Snell MD [facility] and [facility] for Prostate and Urologic Cancers        Today's Diagnoses     Lesion of bladder    -  1    Bladder pain        Myalgia of pelvic floor        Pelvic floor dysfunction          Care Instructions    Please use the vaginal valium as directed    Please continue pelvic floor therapy    Please return to see us in 3-4 months, sooner if needed    It was a pleasure meeting with you today.  Thank you for allowing me and my team the privilege of caring for you today.  YOU are the reason we are here, and I truly hope we provided you with the excellent service you deserve.  Please let us know if there is anything else we can do for you so that we can be sure you are leaving completely satisfied with your care experience.    AFTER YOUR CYSTOSCOPY        You have just completed a cystoscopy, or \"cysto\", which allowed your physician to learn more about your bladder (or to remove a stent placed after surgery). We suggest that you continue to avoid caffeine, fruit juice, and alcohol for the next 24 hours, however, you are encouraged to return to your normal activities.         A few things that are considered normal after your cystoscopy:     * Small amount of bleeding (or spotting) that clears within the next 24 hours     * Slight burning sensation with urination     * Sensation to of needing to avoid more frequently     * The feeling of \"air\" in your urine     * Mild discomfort that is relieved with Tylenol        Please contact our office promptly if you:     * Develop a fever above 101 degrees     * Are unable to urinate     * Develop bright red blood that does not stop     * Severe pain or swelling         Please contact our office with any concerns or " "questions @[phone]          Follow-ups after your visit        Your next 10 appointments already scheduled     Nov 26, 2018  2:15 PM CST   (Arrive by 2:00 PM)   New Allergy with Christiano Zurita MD   [facility] for Lung Science and Health ([facility] [place])    [address] [address] [phone]           Do not take anti-histamines or Zantac for seven day prior to your appointment.              Who to contact     Please call your clinic at [phone] to:    Ask questions about your health    Make or cancel appointments    Discuss your medicines    Learn about your test results    Speak to your doctor            Additional Information About Your Visit        [employer] Information     [employer] gives you secure access to your electronic health record. If you see a primary care provider, you can also send messages to your care team and make appointments. If you have questions, please call your primary care clinic.  If you do not have a primary care provider, please call [phone] and they will assist you.      [employer] is an electronic gateway that provides easy, online access to your medical records. With [employer], you can request a clinic appointment, read your test results, renew a prescription or communicate with your care team.     To access your existing account, please contact your [facility] or call [phone] for assistance.        Care EveryWhere ID     This is your Care EveryWhere ID. This could be used by other organizations to access your [place] medical records  [id number]        Your Vitals Were     Pulse Height Last Period BMI (Body Mass Index)          68 1.676 m (5' 6\") 09/24/2018 28.73 kg/m2         Blood Pressure from Last 3 Encounters:   10/04/18 108/75   09/27/18 105/69   09/18/18 114/70    Weight from Last 3 Encounters:   10/04/18 80.7 kg (178 lb)   09/27/18 81.4 kg (179 lb 6.4 oz) "   09/26/18 80.3 kg (177 lb)              We Performed the Following     CYSTOURETHROSCOPY     Urinalysis chemical screen POCT          Today's Medication Changes          These changes are accurate as of 10/4/18  9:29 AM.  If you have any questions, ask your nurse or doctor.               Start taking these medicines.        Dose/Directions    COMPOUND CONTAINING CONTROLLED SUBSTANCE - PHARMACY TO MIX COMPOUNDED MEDICATION   Commonly known as:  CMPD RX   Used for:  Myalgia of pelvic floor, Pelvic floor dysfunction, Bladder pain   Started by:  Emily Snell MD        VAGINAL VALIUM SUPPOSITORY 5MG AT NIGHT AND PRIOR TO THERAPY AS NEEDED   Quantity:  40 suppository   Refills:  3            Where to get your medicines      Some of these will need a paper prescription and others can be bought over the counter.  Ask your nurse if you have questions.     Bring a paper prescription for each of these medications     COMPOUND CONTAINING CONTROLLED SUBSTANCE - PHARMACY TO MIX COMPOUNDED MEDICATION                Primary Care Provider Office Phone # Fax #    Leatha Robles [phone]       [address] [address]        Equal Access to Services     [facility]: Hadii wayne ku hadasho Soomaali, waaxda luqadaha, qaybta kaalmada adeegyada, waxay josé manuel schwartz . So [facility] [phone].    ATENCIÓN: Si habla español, tiene a hernandez disposición servicios gratuitos de asistencia lingüística. Llame al [phone].    We comply with applicable federal civil rights laws and [place] laws. We do not discriminate on the basis of race, color, national origin, age, disability, sex, sexual orientation, or gender identity.            Thank you!     Thank you for choosing [facility] UROLOGY AND [facility] FOR PROSTATE AND UROLOGIC CANCERS  for your care. Our goal is always to provide you with excellent care. Hearing back from our patients is one way we can continue to improve our  services. Please take a few minutes to complete the written survey that you may receive in the mail after your visit with us. Thank you!             Your Updated Medication List - Protect others around you: Learn how to safely use, store and throw away your medicines at www.disposemymeds.org.          This list is accurate as of 10/4/18  9:29 AM.  Always use your most recent med list.                   Brand Name Dispense Instructions for use Diagnosis    buPROPion 75 MG tablet    WELLBUTRIN    90 tablet    Start with one in the am, and if you tolerate, increase to 1 twice a day.    Adjustment disorder with depressed mood       COMPOUND CONTAINING CONTROLLED SUBSTANCE - PHARMACY TO MIX COMPOUNDED MEDICATION    CMPD RX    40 suppository    VAGINAL VALIUM SUPPOSITORY 5MG AT NIGHT AND PRIOR TO THERAPY AS NEEDED    Myalgia of pelvic floor, Pelvic floor dysfunction, Bladder pain       DAILY MULTIVITAMIN PO      Take  by mouth.        FISH OIL PO      Take  by mouth.        fluticasone 50 MCG/ACT spray    FLONASE    3 Package    Spray 1 spray into both nostrils daily.    Seasonal allergies       triamcinolone 0.1 % cream    KENALOG     CHANELLE EXT AA BID        VITAMIN D PO      Take 5,000 Units by mouth.

## 2018-10-04 NOTE — LETTER
"10/4/2018       RE: Janet Ryder  [address]  [facility] [address]     Dear Colleague,    Thank you for referring your patient, Janet Ryder, to the [facility] UROLOGY AND INST FOR PROSTATE AND UROLOGIC CANCERS at [facility]. Please see a copy of my visit note below.    October 4, 2018    Return visit    Patient returns today for follow up for cystoscopy to assess for stability of a hypervascular lesion noted on her last cystoscopy in June.  She denies any changes in her health since last visit.    /75  Pulse 68  Ht 1.676 m (5' 6\")  Wt 80.7 kg (178 lb)  LMP 09/24/2018  BMI 28.73 kg/m2  She is comfortable, in no distress, non-labored breathing.  Abdomen is soft, non-tender, non-distended.  Normal external female genitalia.  Negative CST.  Pelvic exam is remarkable for myofascial tenderness of the pelvic floor.    Cystoscopy Note: After informed consent was obtained patient was prepped and draped in the standard fashion.  The flexible cystoscope was inserted into a normal appearing urethral meatus.  The urothelium was carefully examined and there were no obvious tumors, masses, stones, foreign bodies, or other urothelial abnormalities noted.  Bilateral ureteral orifices were noted in the normal orthotopic position and both effluxed clear urine.  The cystoscope was retroflexed and the bladder neck was unremarkable.  The urethra was carefully examined upon removing the cystoscope and was unremarkable.  Patient tolerated the procedure without complications noted.      A/P: 38 year old F with history of hip dysplasia and right knee pain with urinary urgency frequency, bladder pain, dyspareunia, myofascial tenderness of the pelvic floor, pelvic floor dysfunction    Continue pelvic floor therapy    Trial of vaginal valium    RTC 3-4 months, sooner if needed    Emily Snell MD MPH   of Urology    CC  Patient Care Team:  Leatha Robles MD as " PCP - General (Family Practice)  Emily Snell MD as MD (Urology)  Abbey Watt, RN as Registered Nurse (Urology)  REEMA LÓPEZ

## 2018-10-04 NOTE — PATIENT INSTRUCTIONS
"Please use the vaginal valium as directed    Please continue pelvic floor therapy    Please return to see us in 3-4 months, sooner if needed    It was a pleasure meeting with you today.  Thank you for allowing me and my team the privilege of caring for you today.  YOU are the reason we are here, and I truly hope we provided you with the excellent service you deserve.  Please let us know if there is anything else we can do for you so that we can be sure you are leaving completely satisfied with your care experience.    AFTER YOUR CYSTOSCOPY        You have just completed a cystoscopy, or \"cysto\", which allowed your physician to learn more about your bladder (or to remove a stent placed after surgery). We suggest that you continue to avoid caffeine, fruit juice, and alcohol for the next 24 hours, however, you are encouraged to return to your normal activities.         A few things that are considered normal after your cystoscopy:     * Small amount of bleeding (or spotting) that clears within the next 24 hours     * Slight burning sensation with urination     * Sensation to of needing to avoid more frequently     * The feeling of \"air\" in your urine     * Mild discomfort that is relieved with Tylenol        Please contact our office promptly if you:     * Develop a fever above 101 degrees     * Are unable to urinate     * Develop bright red blood that does not stop     * Severe pain or swelling         Please contact our office with any concerns or questions @[phone]  "

## 2018-10-11 DIAGNOSIS — F43.22 ADJUSTMENT DISORDER WITH ANXIOUS MOOD: Primary | ICD-10-CM

## 2018-10-11 RX ORDER — BUPROPION HYDROCHLORIDE 150 MG/1
150 TABLET ORAL EVERY MORNING
Qty: 30 TABLET | Refills: 1 | Status: SHIPPED | OUTPATIENT
Start: 2018-10-11 | End: 2018-11-30

## 2018-10-22 ENCOUNTER — THERAPY VISIT (OUTPATIENT)
Dept: PHYSICAL THERAPY | Facility: CLINIC | Age: 38
End: 2018-10-22
Payer: COMMERCIAL

## 2018-10-22 DIAGNOSIS — N94.10 DYSPAREUNIA IN FEMALE: Primary | ICD-10-CM

## 2018-10-22 DIAGNOSIS — M99.05 SOMATIC DYSFUNCTION OF PELVIC REGION: ICD-10-CM

## 2018-10-22 DIAGNOSIS — R39.89 BLADDER PAIN: ICD-10-CM

## 2018-10-22 DIAGNOSIS — N94.10 FEMALE DYSPAREUNIA: ICD-10-CM

## 2018-10-22 PROCEDURE — 97110 THERAPEUTIC EXERCISES: CPT | Mod: GP | Performed by: PHYSICAL THERAPIST

## 2018-10-22 PROCEDURE — 97530 THERAPEUTIC ACTIVITIES: CPT | Mod: GP | Performed by: PHYSICAL THERAPIST

## 2018-10-29 ENCOUNTER — TRANSFERRED RECORDS (OUTPATIENT)
Dept: HEALTH INFORMATION MANAGEMENT | Facility: CLINIC | Age: 38
End: 2018-10-29

## 2018-10-29 NOTE — TELEPHONE ENCOUNTER
Date of appointment:11/02/18    Diagnosis/reason for appointment:Fx Hx of malignant neoplasm of ovary  Referring provider/facility:Leatha Robles  Who called:    Recent Studies  Imaging:  Pathology:  Labs:  Previous chemo/radiation (if known):    Records requested from:  Records received from:    Additional information:Records in Epic

## 2018-10-31 ENCOUNTER — THERAPY VISIT (OUTPATIENT)
Dept: PHYSICAL THERAPY | Facility: CLINIC | Age: 38
End: 2018-10-31
Payer: COMMERCIAL

## 2018-10-31 DIAGNOSIS — N94.10 FEMALE DYSPAREUNIA: ICD-10-CM

## 2018-10-31 DIAGNOSIS — R39.89 BLADDER PAIN: ICD-10-CM

## 2018-10-31 DIAGNOSIS — M99.05 SOMATIC DYSFUNCTION OF PELVIC REGION: ICD-10-CM

## 2018-10-31 PROCEDURE — 97112 NEUROMUSCULAR REEDUCATION: CPT | Mod: GP | Performed by: PHYSICAL THERAPIST

## 2018-10-31 PROCEDURE — 97140 MANUAL THERAPY 1/> REGIONS: CPT | Mod: GP | Performed by: PHYSICAL THERAPIST

## 2018-10-31 NOTE — MR AVS SNAPSHOT
After Visit Summary   10/31/2018    Janet Ryder    MRN: [id number]           Patient Information     Date Of Birth          1980        Visit Information        Provider Department      10/31/2018 8:10 AM Susana Ng, PT [place] [facility] Physical Therapy        Today's Diagnoses     Somatic dysfunction of pelvic region        Female dyspareunia        Bladder pain           Follow-ups after your visit        Your next 10 appointments already scheduled     Nov 02, 2018  2:15 PM CDT   (Arrive by 2:00 PM)   NEW WITH ROOM with Stephanie Thompson GC,  2 119 CONSULT RM   [facility] ([facility])    [address]  [address]  [facility] [address]   [phone]            Nov 02, 2018  3:30 PM CDT   [facility] Lab Draw with [facility] LAB DRAW   [facility] Lab Draw ([facility])    [address]  [address]  [facility] [address]   [phone]            Nov 09, 2018  4:45 PM CST   MR BRAIN W/O & W CONTRAST with [address] MRI ([facility])    [address]  1st Floor  [facility] [address]   [phone]           How do I prepare for my exam? (Food and drink instructions) **If you will be receiving sedation or general anesthesia, please see special notes below.**  How do I prepare for my exam? (Other instructions) Take your medicines as usual, unless your doctor tells you not to. You may or may not receive intravenous (IV) contrast for this exam pending the discretion of the Radiologist.  You do not need to do anything special to prepare.  **If you will be receiving sedation or general anesthesia, please see special notes below.**  What should I wear: The MRI machine uses a strong magnet. Please wear clothes without metal (snaps, zippers). A sweatsuit works well, or we may give you a hospital gown. Please remove any body  piercings and hair extensions before you arrive. You will also remove watches, jewelry, hairpins, wallets, dentures, partial dental plates and hearing aids. You may wear contact lenses, and you may be able to wear your rings. We have a safe place to keep your personal items, but it is safer to leave them at home.  How long does the exam take: Most tests take 30 to 60 minutes.  HOWEVER, IF YOUR DOCTOR PRESCRIBES ANESTHESIA please plan on spending four to five hours in the recovery room.  What should I bring:  Bring a list of your current medicines to your exam (including vitamins, minerals and over-the-counter drugs).  Do I need a :  **If you will be receiving sedation or general anesthesia, please see special notes below.**  What should I do after the exam: No Restrictions, You may resume normal activities.  What is this test: MRI (magnetic resonance imaging) uses a strong magnet and radio waves to look inside the body. An MRA (magnetic resonance angiogram) does the same thing, but it lets us look at your blood vessels. A computer turns the radio waves into pictures showing cross sections of the body, much like slices of bread. This helps us see any problems more clearly. You may receive fluid (called  contrast ) before or during your scan. The fluid helps us see the pictures better. We give the fluid through an IV (small needle in your arm).  Who should I call with questions:  Please call the Imaging Department at your exam site with any questions. Directions, parking instructions, and other information is available on our website, [email or web address].  How do I prepare if I m having sedation or anesthesia? **IMPORTANT** THE INSTRUCTIONS BELOW ARE ONLY FOR THOSE PATIENTS WHO HAVE BEEN TOLD THEY WILL RECEIVE SEDATION OR GENERAL ANESTHESIA DURING THEIR MRI PROCEDURE:  IF YOU WILL RECEIVE SEDATION (take medicine to help you relax during your exam): You must get the medicine from your doctor before you arrive.  Bring the medicine to the exam. Do not take it at home. Arrive one hour early. Bring someone who can take you home after the test. Your medicine will make you sleepy. After the exam, you may not drive, take a bus or take a taxi by yourself. No eating 8 hours before your exam. You may have clear liquids up until 4 hours before your exam. (Clear liquids include water, clear tea, black coffee and fruit juice without pulp.)  IF YOU WILL RECEIVE ANESTHESIA (be asleep for your exam): Arrive 1 1/2 hours early. Bring someone who can take you home after the test. You may not drive, take a bus or take a taxi by yourself. No eating 8 hours before your exam. You may have clear liquids up until 4 hours before your exam. (Clear liquids include water, clear tea, black coffee and fruit juice without pulp.)            Nov 15, 2018  9:30 AM CST   DANIELLE For Women Only with Susana Ng PT   [place] for Athletic Medicine [facility] Physical Therapy ([facility]  )    [address] [place] [id number] [phone]            Nov 26, 2018  2:15 PM CST   (Arrive by 2:00 PM)   New Allergy with Christiano Zurita MD   [facility] ([facility])    [address] [place] [address]  [address] [place] [address] [phone]           Do not take anti-histamines or Zantac for seven day prior to your appointment.            Feb 07, 2019  8:30 AM CST   (Arrive by 8:15 AM)   Return Visit with Emily Snell MD   [facility] and [facility] for Prostate and Urologic Cancers ([facility] [place])    [address] [place] [address]  4th Floor  [facility] [address] [phone]              Who to contact     If you have questions or need follow up information about today's clinic visit or your schedule please contact [place] FOR [facility]IC [facility] PHYSICAL THERAPY directly at [phone].  Normal or non-critical lab and imaging results  will be communicated to you by MyChart, letter or phone within 4 business days after the clinic has received the results. If you do not hear from us within 7 days, please contact the clinic through [employer] or phone. If you have a critical or abnormal lab result, we will notify you by phone as soon as possible.  Submit refill requests through [employer] or call your pharmacy and they will forward the refill request to us. Please allow 3 business days for your refill to be completed.          Additional Information About Your Visit        [employer] Information     [employer] gives you secure access to your electronic health record. If you see a primary care provider, you can also send messages to your care team and make appointments. If you have questions, please call your primary care clinic.  If you do not have a primary care provider, please call [phone] and they will assist you.        Care EveryWhere ID     This is your Care EveryWhere ID. This could be used by other organizations to access your [place] medical records  [id number]         Blood Pressure from Last 3 Encounters:   10/04/18 108/75   09/27/18 105/69   09/18/18 114/70    Weight from Last 3 Encounters:   10/04/18 80.7 kg (178 lb)   09/27/18 81.4 kg (179 lb 6.4 oz)   09/26/18 80.3 kg (177 lb)              We Performed the Following     DANIELLE PROGRESS NOTES REPORT     MANUAL THER TECH,1+REGIONS,EA 15 MIN     NEUROMUSCULAR RE-EDUCATION        Primary Care Provider Office Phone # Fax #    Leatha Robles [phone]       [address] [address]        Equal Access to Services     [facility] : Hadii aad marianna hadasho Soomaali, waaxda luqadaha, qaybta kaalmada emiliano max . So [facility] [phone].    ATENCIÓN: Si habla español, tiene a hernandez disposición servicios gratuitos de asistencia lingüística. Llame al [phone].    We comply with applicable federal civil rights laws and  [place] laws. We do not discriminate on the basis of race, color, national origin, age, disability, sex, sexual orientation, or gender identity.            Thank you!     Thank you for choosing [place] FOR ATHLETIC MEDICINE [facility] PHYSICAL THERAPY  for your care. Our goal is always to provide you with excellent care. Hearing back from our patients is one way we can continue to improve our services. Please take a few minutes to complete the written survey that you may receive in the mail after your visit with us. Thank you!             Your Updated Medication List - Protect others around you: Learn how to safely use, store and throw away your medicines at www.disposemymeds.org.          This list is accurate as of 10/31/18  9:10 AM.  Always use your most recent med list.                   Brand Name Dispense Instructions for use Diagnosis    buPROPion 150 MG 24 hr tablet    WELLBUTRIN XL    30 tablet    Take 1 tablet (150 mg) by mouth every morning    Adjustment disorder with anxious mood       COMPOUND CONTAINING CONTROLLED SUBSTANCE - PHARMACY TO MIX COMPOUNDED MEDICATION    CMPD RX    40 suppository    VAGINAL VALIUM SUPPOSITORY 5MG AT NIGHT AND PRIOR TO THERAPY AS NEEDED    Myalgia of pelvic floor, Pelvic floor dysfunction, Bladder pain       DAILY MULTIVITAMIN PO      Take  by mouth.        FISH OIL PO      Take  by mouth.        fluticasone 50 MCG/ACT spray    FLONASE    3 Package    Spray 1 spray into both nostrils daily.    Seasonal allergies       triamcinolone 0.1 % cream    KENALOG     CHANELLE EXT AA BID        VITAMIN D PO      Take 5,000 Units by mouth.

## 2018-10-31 NOTE — PROGRESS NOTES
[facility]  Physical Exam  General   ROS    PROGRESS  REPORT    Progress reporting period is from 8/2/2018 to 10/31/2018.       SUBJECTIVE  Subjective: Pt reports that she did see RA and MD didn't feel RA was an issue but encouraged her to continue on with primary MD to figure out symptoms. She continues to have L sided numbness/incoordination issues. She hasn't had episodes of severe pelvic pain for at least 1 month. She feels urgency is overall improving        Initial Pain level: 8/10.   Changes in function:  Yes (See Goal flowsheet attached for changes in current functional level)  Adverse reaction to treatment or activity: None    OBJECTIVE  Changes noted in objective findings:  Yes,     10/31/2018  Baseline PF tone:hyper  PF Tone with cough: hyper  PF Response quality: normal   PF Power: Center: 2+-3/5  Endurance: Maximum contraction in seconds: Able to hold for 10 seconds   endurance contractions before fatigue: NT  Quick contraction repetitions prior to fatigue: 10 with verbal and manual cues  Specificity/accessory muscles: Improved isolation of PF    8/2/2018  Baseline PF tone:hyper  PF Tone with cough: hyper  PF Response quality: moderate  PF Power: Center: 2   Endurance: Maximum contraction in seconds: 8 decreased strength with increased time  # of endurance contractions before fatigue: NT  Quick contraction repetitions prior to fatigue: 9 with verbal and manual cues  Specificity/accessory muscles: Abdominals, glutes, neck          Objective: Good control of PF noted. Trial of E-stim for muscular relaxation; Reviewed stretching for home with dilator set. Improved tone at end of session      ASSESSMENT/PLAN  Updated problem list and treatment plan: Diagnosis 1:  Bladder pain, Pelvic Pain   Pain -  manual therapy, self management, education and home program  Decreased ROM/flexibility - manual therapy, therapeutic exercise, therapeutic activity and home program  Impaired muscle performance - biofeedback,  electric stimulation, neuro re-education and home program  STG/LTGs have been met or progress has been made towards goals:  Yes (See Goal flow sheet completed today.)  Assessment of Progress: The patient's condition is improving.  Self Management Plans:  Patient has been instructed in a home treatment program.  I have re-evaluated this patient and find that the nature, scope, duration and intensity of the therapy is appropriate for the medical condition of the patient.  Janet continues to require the following intervention to meet STG and LTG's:  PT    Recommendations:  This patient would benefit from continued therapy.     Frequency:  2 X a month, once daily  Duration:  for 2 months        Please refer to the daily flowsheet for treatment today, total treatment time and time spent performing 1:1 timed codes.

## 2018-11-02 ENCOUNTER — PRE VISIT (OUTPATIENT)
Dept: ONCOLOGY | Facility: CLINIC | Age: 38
End: 2018-11-02

## 2018-11-02 ENCOUNTER — OFFICE VISIT (OUTPATIENT)
Dept: ONCOLOGY | Facility: CLINIC | Age: 38
End: 2018-11-02
Attending: GENETIC COUNSELOR, MS
Payer: COMMERCIAL

## 2018-11-02 DIAGNOSIS — Z80.41 FAMILY HISTORY OF MALIGNANT NEOPLASM OF OVARY: Primary | ICD-10-CM

## 2018-11-02 DIAGNOSIS — Z80.0 FAMILY HISTORY OF COLON CANCER: ICD-10-CM

## 2018-11-02 PROCEDURE — 96040 ZZH GENETIC COUNSELING, EACH 30 MINUTES: CPT | Mod: ZF | Performed by: GENETIC COUNSELOR, MS

## 2018-11-02 NOTE — PROGRESS NOTES
11/2/2018    Referring Provider: Leatha Robles MD    Presenting Information:   I met with Janet Ryder today for genetic counseling at the Cancer Risk Management Program at the [facility] to discuss her family history of ovarian and other cancers.  She is here today to review this history, cancer screening recommendations, and available genetic testing options.    Personal History:  Janet is a 38 year old female.  She does not have any personal history of cancer.      She had her first menstrual period at age 14 and is premenopausal. She does not have children.  Janet has her ovaries, fallopian tubes and uterus in place. She had a transvaginal ultrasound in September of 2018, which was normal. This was performed as a follow up to the imaging of her right hip in which her right ovary looked relatively prominent and edematous.  She reports about 15 years of oral contraceptive use and no hormone replacement therapy.      Her most recent OB-GYN exam and Pap smear in June of this year were normal.  She had bilateral mammogram and breast ultrasound because of bilateral breast lumps in 2014, and the results were normal. She was recommended to begin annual mammography at 42. Janet has not had a colonoscopy. She has had keratosis twice in the past 3 years. She does not regularly do any other cancer screening at this time.  Janet reported no tobacco use, and occasional alcohol use.    Family History: (Please see scanned pedigree for detailed family history information) Per Janet, her family history is significant for the following:    Matthieus mother was diagnosed with ovarian cancer at age 67 and passed away at 67.    Janet's maternal grandfather was diagnosed with colon cancer in his late 60s and with melanoma in his early 70s. He also had pulmonary fibrosis. He passed away at 76.    Matthieus mother's maternal female first cousin (Janet's maternal grandmother's brother's daughter)  was diagnosed with breast cancer at age 45. She is now 58.     Her maternal ethnicity is . Her paternal ethnicity is White.  There is no known Ashkenazi [place] ancestry on either side of her family. There is no reported consanguinity.    Discussion:    Janet's family history of ovarian cancer is suggestive of a hereditary cancer syndrome.    We reviewed the features of sporadic, familial, and hereditary cancers.  In looking at Janet's family history, it is possible that a cancer susceptibility gene is present due to the ovarian cancer in Janet's mother and other cancers in Janet's maternal family.    We discussed the natural history and genetics of ovarian cancer. A detailed handout regarding genetics of gynecological cancers and the information we discussed was provided to Janet at the end of our appointment today and can be found in the after visit summary.  Topics included: inheritance pattern, cancer risks, cancer screening recommendations, and also risks, benefits and limitations of testing.    We first discussed about hereditary breast and ovarian cancer syndrome.    Hereditary Breast and Ovarian Cancer Syndrome (HBOC) is caused by a mutation in the BRCA1 or BRCA2 gene.  HBOC typically presents with multiple family members diagnosed with breast cancer before age 50 and/or ovarian cancer. Other cancer risks associated with HBOC include male breast cancer, prostate cancer, pancreatic cancer, and melanoma.     We also discussed about Field syndrome given the colon cancer history in Janet's maternal grandfather in addition to the ovarian cancer history in her mother.    Field syndrome can be caused by a mutation in one of five genes:  MLH1, MSH2, MSH6, PMS2, and EPCAM.  Field syndrome may present with polyps, but typically a small number.  The highest cancer risks associated with Field syndrome include colon cancer, endometrial/uterine cancer, gastric cancer, and ovarian cancer.    Based  on her personal and family history, Janet meets current National Comprehensive Cancer Network (NCCN) criteria for genetic testing of BRCA1/BRCA2.      We discussed that there are additional genes that could cause increased risk for cancers in Janet's family.  As many of these genes present with overlapping features in a family and accurate cancer risk cannot always be established based upon the pedigree analysis alone, it would be reasonable for Janet to consider panel genetic testing to analyze multiple genes at once.     We discussed multiple different testing options. Janet was interested in obtaining as much information as possible.   Genetic testing is available for 34 genes associated with hereditary gynecologic and colon cancers: GYN/COLON EXPANDED (APC, MARK, AXIN2, BARD1, BMPR1A, BRCA1, BRCA2, BRIP1, CDH1, CHEK2, DICER1, EPCAM, GREM1, MLH1, MRE11A, MSH2, MSH3, MSH6, MUTYH, NBN, NF1, NTHL1, PALB2, PMS2, POLD1, POLE, PTEN, RAD50, RAD51C, RAD51D, SMAD4, SMARCA4, STK11, TP53).  We discussed that some of the genes in the GYN/COLON Expanded panel are associated with specific hereditary cancer syndromes and have published management guidelines: Hereditary Breast and Ovarian Cancer syndrome (BRCA1, BRCA2), Field syndrome (MLH1, MSH2, MSH6, PMS2, EPCAM), Familial Adenomatous Polyposis (APC), Hereditary Diffuse Gastric Cancer (CDH1), Cowden syndrome (PTEN),  Juvenile Polyposis syndrome (BMPR1A, SMAD4), Li Fraumeni syndrome (TP53), Peutz-Jeghers syndrome (STK11), MUTYH Associated Polyposis syndrome (MUTYH), and Neurofibromatosis type 1 (NF1).    Risk-reducing salpingo-oophorectomy can be considered in women with mutations in BRIP1, RAD51C, or RAD51D.  Breast cancer risk management guidelines are available for MARK, CHEK2, PALB2, NF1, and NBN.  The AXIN2, CHEK2, GREM1, MSH3, NTHL1, POLD1, and POLE genes have also been associated with increased colon cancer risk and have published management guidelines.  The  remaining genes (BARD1, DICER1, MRE11A, RAD50, and SMARCA4) are associated with increased cancer risk and may allow us to make medical recommendations when mutations are identified.    Consent was obtained.    Medical Management: For Janet, we reviewed that the information from genetic testing may determine:    surgery to treat Janet's active cancer diagnosis (i.e. lumpectomy versus bilateral mastectomy, partial versus total colectomy, etc.),    additional cancer screening for which Janet may qualify (i.e. mammogram and breast MRI, more frequent colonoscopies, more frequent dermatologic exams, etc.),    options for risk reducing surgeries Janet could consider (i.e. bilateral mastectomy, surgery to remove the ovaries and/or uterus, etc.),      and targeted chemotherapies for Matthieus if she were to develop certain cancers in the future (i.e. immunotherapy for individuals with Field syndrome, PARP inhibitors, etc.).     These recommendations and possible targeted chemotherapies will be discussed in detail once genetic testing is completed.     We discussed about the Genetic Information Nondiscrimination Act of 2008 (RON), which is a federal law that protects individuals from genetic discrimination in health insurance and employment. We discussed that RON does not cover long-term care insurance life insurance, or disability insurance.     aJnet expressed that she would like to obtain life insurance before proceeding with genetic testing.    We discussed that we can submit preauthorization to her insurance to get approval for genetic testing and in the meantime Janet will get her life insurance. Janet agreed to this plan.     Plan:  1) Preauthorization will be submitted for the Gyn/Colon Expanded panel.   2) Results from the preauthorization should be available in about 3 weeks.   3) Janet will contact me once she is ready to proceed with genetic testing.     Face to face time: 45  darlene Thompson MS, [facility]  Licensed Genetic Counselor  T: [phone]  F: [phone]

## 2018-11-02 NOTE — LETTER
11/2/2018       RE: Janet Ryder  [address]  [facility] [address]     Dear Colleague,    Thank you for referring your patient, Janet Ryder, to the [facility]. Please see a copy of my visit note below.    11/2/2018    Referring Provider: Leatha Robles MD    Presenting Information:   I met with Janet Ryder today for genetic counseling at the Cancer Risk Management Program at the [facility] to discuss her family history of ovarian and other cancers.  She is here today to review this history, cancer screening recommendations, and available genetic testing options.    Personal History:  Janet is a 38 year old female.  She does not have any personal history of cancer.      She had her first menstrual period at age 14 and is premenopausal. She does not have children.  Janet has her ovaries, fallopian tubes and uterus in place. She had a transvaginal ultrasound in September of 2018, which was normal. This was performed as a follow up to the imaging of her right hip in which her right ovary looked relatively prominent and edematous.  She reports about 15 years of oral contraceptive use and no hormone replacement therapy.      Her most recent OB-GYN exam and Pap smear in June of this year were normal.  She had bilateral mammogram and breast ultrasound because of bilateral breast lumps in 2014, and the results were normal. She was recommended to begin annual mammography at 42. Janet has not had a colonoscopy. She has had keratosis twice in the past 3 years. She does not regularly do any other cancer screening at this time.  Janet reported no tobacco use, and occasional alcohol use.    Family History: (Please see scanned pedigree for detailed family history information) Per Janet, her family history is significant for the following:    Janet's mother was diagnosed with ovarian cancer at age 67 and passed away at 67.    Janet's maternal grandfather was  diagnosed with colon cancer in his late 60s and with melanoma in his early 70s. He also had pulmonary fibrosis. He passed away at 76.    Janet's mother's maternal female first cousin (Janet's maternal grandmother's brother's daughter) was diagnosed with breast cancer at age 45. She is now 58.     Her maternal ethnicity is . Her paternal ethnicity is White.  There is no known Ashkenazi [place] ancestry on either side of her family. There is no reported consanguinity.    Discussion:    Janet's family history of ovarian cancer is suggestive of a hereditary cancer syndrome.    We reviewed the features of sporadic, familial, and hereditary cancers.  In looking at Janet's family history, it is possible that a cancer susceptibility gene is present due to the ovarian cancer in Janet's mother and other cancers in Janet's maternal family.    We discussed the natural history and genetics of ovarian cancer. A detailed handout regarding genetics of gynecological cancers and the information we discussed was provided to Janet at the end of our appointment today and can be found in the after visit summary.  Topics included: inheritance pattern, cancer risks, cancer screening recommendations, and also risks, benefits and limitations of testing.    We first discussed about hereditary breast and ovarian cancer syndrome.    Hereditary Breast and Ovarian Cancer Syndrome (HBOC) is caused by a mutation in the BRCA1 or BRCA2 gene.  HBOC typically presents with multiple family members diagnosed with breast cancer before age 50 and/or ovarian cancer. Other cancer risks associated with HBOC include male breast cancer, prostate cancer, pancreatic cancer, and melanoma.     We also discussed about Field syndrome given the colon cancer history in Janet's maternal grandfather in addition to the ovarian cancer history in her mother.    Field syndrome can be caused by a mutation in one of five genes:  MLH1, MSH2, MSH6,  PMS2, and EPCAM.  Field syndrome may present with polyps, but typically a small number.  The highest cancer risks associated with Field syndrome include colon cancer, endometrial/uterine cancer, gastric cancer, and ovarian cancer.    Based on her personal and family history, Janet meets current National Comprehensive Cancer Network (NCCN) criteria for genetic testing of BRCA1/BRCA2.      We discussed that there are additional genes that could cause increased risk for cancers in Janet's family.  As many of these genes present with overlapping features in a family and accurate cancer risk cannot always be established based upon the pedigree analysis alone, it would be reasonable for Janet to consider panel genetic testing to analyze multiple genes at once.     We discussed multiple different testing options. Janet was interested in obtaining as much information as possible.   Genetic testing is available for 34 genes associated with hereditary gynecologic and colon cancers: GYN/COLON EXPANDED (APC, MARK, AXIN2, BARD1, BMPR1A, BRCA1, BRCA2, BRIP1, CDH1, CHEK2, DICER1, EPCAM, GREM1, MLH1, MRE11A, MSH2, MSH3, MSH6, MUTYH, NBN, NF1, NTHL1, PALB2, PMS2, POLD1, POLE, PTEN, RAD50, RAD51C, RAD51D, SMAD4, SMARCA4, STK11, TP53).  We discussed that some of the genes in the GYN/COLON Expanded panel are associated with specific hereditary cancer syndromes and have published management guidelines: Hereditary Breast and Ovarian Cancer syndrome (BRCA1, BRCA2), Field syndrome (MLH1, MSH2, MSH6, PMS2, EPCAM), Familial Adenomatous Polyposis (APC), Hereditary Diffuse Gastric Cancer (CDH1), Cowden syndrome (PTEN),  Juvenile Polyposis syndrome (BMPR1A, SMAD4), Li Fraumeni syndrome (TP53), Peutz-Jeghers syndrome (STK11), MUTYH Associated Polyposis syndrome (MUTYH), and Neurofibromatosis type 1 (NF1).    Risk-reducing salpingo-oophorectomy can be considered in women with mutations in BRIP1, RAD51C, or RAD51D.  Breast cancer risk  management guidelines are available for MARK, CHEK2, PALB2, NF1, and NBN.  The AXIN2, CHEK2, GREM1, MSH3, NTHL1, POLD1, and POLE genes have also been associated with increased colon cancer risk and have published management guidelines.  The remaining genes (BARD1, DICER1, MRE11A, RAD50, and SMARCA4) are associated with increased cancer risk and may allow us to make medical recommendations when mutations are identified.    Consent was obtained.    Medical Management: For Janet, we reviewed that the information from genetic testing may determine:    surgery to treat Janet's active cancer diagnosis (i.e. lumpectomy versus bilateral mastectomy, partial versus total colectomy, etc.),    additional cancer screening for which Janet may qualify (i.e. mammogram and breast MRI, more frequent colonoscopies, more frequent dermatologic exams, etc.),    options for risk reducing surgeries Janet could consider (i.e. bilateral mastectomy, surgery to remove the ovaries and/or uterus, etc.),      and targeted chemotherapies for Matthieus if she were to develop certain cancers in the future (i.e. immunotherapy for individuals with Field syndrome, PARP inhibitors, etc.).     These recommendations and possible targeted chemotherapies will be discussed in detail once genetic testing is completed.     We discussed about the Genetic Information Nondiscrimination Act of 2008 (RON), which is a federal law that protects individuals from genetic discrimination in health insurance and employment. We discussed that RON does not cover long-term care insurance life insurance, or disability insurance.     Janet expressed that she would like to obtain life insurance before proceeding with genetic testing.    We discussed that we can submit preauthorization to her insurance to get approval for genetic testing and in the meantime Janet will get her life insurance. Janet agreed to this plan.     Plan:  1) Preauthorization will be  submitted for the Gyn/Colon Expanded panel.   2) Results from the preauthorization should be available in about 3 weeks.   3) Janet will contact me once she is ready to proceed with genetic testing.     Face to face time: 45 minutes    Stephanie Thompson MS, [facility]  Licensed Genetic Counselor  T: [phone]  F: [phone]

## 2018-11-02 NOTE — MR AVS SNAPSHOT
After Visit Summary   11/2/2018    Janet Ryder    MRN: [id number]           Patient Information     Date Of Birth          1980        Visit Information        Provider Department      11/2/2018 2:15 PM Stephanie Thompson GC;  2 119 CONSULT [facility]        Today's Diagnoses     Family history of malignant neoplasm of ovary    -  1    Family history of colon cancer           Follow-ups after your visit        Your next 10 appointments already scheduled     Nov 09, 2018  4:45 PM CST   MR BRAIN W/O & W CONTRAST with [address] [facility])    [address] [place] [id number] [phone]           How do I prepare for my exam? (Food and drink instructions) **If you will be receiving sedation or general anesthesia, please see special notes below.**  How do I prepare for my exam? (Other instructions) Take your medicines as usual, unless your doctor tells you not to. You may or may not receive intravenous (IV) contrast for this exam pending the discretion of the Radiologist.  You do not need to do anything special to prepare.  **If you will be receiving sedation or general anesthesia, please see special notes below.**  What should I wear: The MRI machine uses a strong magnet. Please wear clothes without metal (snaps, zippers). A sweatsuit works well, or we may give you a hospital gown. Please remove any body piercings and hair extensions before you arrive. You will also remove watches, jewelry, hairpins, wallets, dentures, partial dental plates and hearing aids. You may wear contact lenses, and you may be able to wear your rings. We have a safe place to keep your personal items, but it is safer to leave them at home.  How long does the exam take: Most tests take 30 to 60 minutes.  HOWEVER, IF YOUR DOCTOR PRESCRIBES ANESTHESIA please plan on spending four to five hours in the recovery room.  What should  I bring:  Bring a list of your current medicines to your exam (including vitamins, minerals and over-the-counter drugs).  Do I need a :  **If you will be receiving sedation or general anesthesia, please see special notes below.**  What should I do after the exam: No Restrictions, You may resume normal activities.  What is this test: MRI (magnetic resonance imaging) uses a strong magnet and radio waves to look inside the body. An MRA (magnetic resonance angiogram) does the same thing, but it lets us look at your blood vessels. A computer turns the radio waves into pictures showing cross sections of the body, much like slices of bread. This helps us see any problems more clearly. You may receive fluid (called  contrast ) before or during your scan. The fluid helps us see the pictures better. We give the fluid through an IV (small needle in your arm).  Who should I call with questions:  Please call the Imaging Department at your exam site with any questions. Directions, parking instructions, and other information is available on our website, [employer].  How do I prepare if I m having sedation or anesthesia? **IMPORTANT** THE INSTRUCTIONS BELOW ARE ONLY FOR THOSE PATIENTS WHO HAVE BEEN TOLD THEY WILL RECEIVE SEDATION OR GENERAL ANESTHESIA DURING THEIR MRI PROCEDURE:  IF YOU WILL RECEIVE SEDATION (take medicine to help you relax during your exam): You must get the medicine from your doctor before you arrive. Bring the medicine to the exam. Do not take it at home. Arrive one hour early. Bring someone who can take you home after the test. Your medicine will make you sleepy. After the exam, you may not drive, take a bus or take a taxi by yourself. No eating 8 hours before your exam. You may have clear liquids up until 4 hours before your exam. (Clear liquids include water, clear tea, black coffee and fruit juice without pulp.)  IF YOU WILL RECEIVE ANESTHESIA (be asleep for your exam): Arrive 1 1/2 hours  early. Bring someone who can take you home after the test. You may not drive, take a bus or take a taxi by yourself. No eating 8 hours before your exam. You may have clear liquids up until 4 hours before your exam. (Clear liquids include water, clear tea, black coffee and fruit juice without pulp.)            Nov 15, 2018  9:30 AM CST   DANIELLE For Women Only with Susana Ng PT   [facility]  )    [address] [place] [id number] [phone]            Nov 26, 2018  2:15 PM CST   (Arrive by 2:00 PM)   New Allergy with Christiano Zurita MD   [facility] ([facility])    [address] [place] [address]  [address] [place] [id number] [phone]           Do not take anti-histamines or Zantac for seven day prior to your appointment.            Feb 07, 2019  8:30 AM CST   (Arrive by 8:15 AM)   Return Visit with Emily Snell MD   [facility] ([facility])    [address] [place] [address]  4th Floor  [facility] [address] [phone]              Who to contact     If you have questions or need follow up information about today's clinic visit or your schedule please contact [facility] directly at [phone].  Normal or non-critical lab and imaging results will be communicated to you by MyChart, letter or phone within 4 business days after the clinic has received the results. If you do not hear from us within 7 days, please contact the clinic through MyChart or phone. If you have a critical or abnormal lab result, we will notify you by phone as soon as possible.  Submit refill requests through [employer] or call your pharmacy and they will forward the refill request to us. Please allow 3 business days for your refill to be completed.          Additional Information About Your Visit         [employer] Information     [employer] gives you secure access to your electronic health record. If you see a primary care provider, you can also send messages to your care team and make appointments. If you have questions, please call your primary care clinic.  If you do not have a primary care provider, please call [phone] and they will assist you.        Care EveryWhere ID     This is your Care EveryWhere ID. This could be used by other organizations to access your [facility] medical records  [id number]         Blood Pressure from Last 3 Encounters:   10/04/18 108/75   09/27/18 105/69   09/18/18 114/70    Weight from Last 3 Encounters:   10/04/18 80.7 kg (178 lb)   09/27/18 81.4 kg (179 lb 6.4 oz)   09/26/18 80.3 kg (177 lb)              Today, you had the following     No orders found for display       Primary Care Provider Office Phone # Fax #    Leatha Robles [phone]       [address] [address]        Equal Access to Services     [facility]: Hadii wayne ku hadasho Soomaali, waaxda luqadaha, qaybta kaalmada adeegcam, emiliano schwartz . So [facility] [phone].    ATENCIÓN: Si habla español, tiene a hernandez disposición servicios gratuitos de asistencia lingüística. Melodie al [phone].    We comply with applicable federal civil rights laws and [place] laws. We do not discriminate on the basis of race, color, national origin, age, disability, sex, sexual orientation, or gender identity.            Thank you!     Thank you for choosing [facility]  for your care. Our goal is always to provide you with excellent care. Hearing back from our patients is one way we can continue to improve our services. Please take a few minutes to complete the written survey that you may receive in the mail after your visit with us. Thank you!             Your Updated Medication List - Protect others around you: Learn how to safely use, store  and throw away your medicines at www.disposemymeds.org.          This list is accurate as of 11/2/18 11:59 PM.  Always use your most recent med list.                   Brand Name Dispense Instructions for use Diagnosis    buPROPion 150 MG 24 hr tablet    WELLBUTRIN XL    30 tablet    Take 1 tablet (150 mg) by mouth every morning    Adjustment disorder with anxious mood       COMPOUND CONTAINING CONTROLLED SUBSTANCE - PHARMACY TO MIX COMPOUNDED MEDICATION    CMPD RX    40 suppository    VAGINAL VALIUM SUPPOSITORY 5MG AT NIGHT AND PRIOR TO THERAPY AS NEEDED    Myalgia of pelvic floor, Pelvic floor dysfunction, Bladder pain       DAILY MULTIVITAMIN PO      Take  by mouth.        FISH OIL PO      Take  by mouth.        fluticasone 50 MCG/ACT spray    FLONASE    3 Package    Spray 1 spray into both nostrils daily.    Seasonal allergies       triamcinolone 0.1 % cream    KENALOG     CHANELLE EXT AA BID        VITAMIN D PO      Take 5,000 Units by mouth.

## 2018-11-02 NOTE — LETTER
Cancer Risk Management  Program Locations    [facility]  [facility]  [facility]  [facility]  Mailing Address  Cancer Risk Management Program  [facility]  [address]  [place] [address]    New patient appointments  [phone]  November 5, 2018    Janet Ryder  [address]  [facility] [address]      Dear Janet,    It was a pleasure meeting with you at the [facility] on 11/2/2018.  Here is a copy of the progress note from your recent genetic counseling visit to the Cancer Risk Management Program.  If you have any additional questions, please feel free to call.      11/2/2018    Referring Provider: Leatha Robles MD    Presenting Information:   I met with Janet Aleksey today for genetic counseling at the Cancer Risk Management Program at the [facility] to discuss her family history of ovarian and other cancers.  She is here today to review this history, cancer screening recommendations, and available genetic testing options.    Personal History:  Janet is a 38 year old female.  She does not have any personal history of cancer.      She had her first menstrual period at age 14 and is premenopausal. She does not have children.  Janet has her ovaries, fallopian tubes and uterus in place. She had a transvaginal ultrasound in September of 2018, which was normal. This was performed as a follow up to the imaging of her right hip in which her right ovary looked relatively prominent and edematous.  She reports about 15 years of oral contraceptive use and no hormone replacement therapy.      Her most recent OB-GYN exam and Pap smear in June of this year were normal.  She had bilateral mammogram and breast ultrasound because of bilateral breast lumps in 2014, and the results were normal. She was  recommended to begin annual mammography at 42. Janet has not had a colonoscopy. She has had keratosis twice in the past 3 years. She does not regularly do any other cancer screening at this time.  Janet reported no tobacco use, and occasional alcohol use.    Family History: (Please see scanned pedigree for detailed family history information) Per Janet, her family history is significant for the following:    Matthieus mother was diagnosed with ovarian cancer at age 67 and passed away at 67.    Janet's maternal grandfather was diagnosed with colon cancer in his late 60s and with melanoma in his early 70s. He also had pulmonary fibrosis. He passed away at 76.    Janet's mother's maternal female first cousin (Janet's maternal grandmother's brother's daughter) was diagnosed with breast cancer at age 45. She is now 58.     Her maternal ethnicity is . Her paternal ethnicity is White.  There is no known Ashkenazi Sikh ancestry on either side of her family. There is no reported consanguinity.    Discussion:    Janet's family history of ovarian cancer is suggestive of a hereditary cancer syndrome.    We reviewed the features of sporadic, familial, and hereditary cancers.  In looking at Janet's family history, it is possible that a cancer susceptibility gene is present due to the ovarian cancer in Janet's mother and other cancers in Janet's maternal family.    We discussed the natural history and genetics of ovarian cancer. A detailed handout regarding genetics of gynecological cancers and the information we discussed was provided to Janet at the end of our appointment today and can be found in the after visit summary.  Topics included: inheritance pattern, cancer risks, cancer screening recommendations, and also risks, benefits and limitations of testing.    We first discussed about hereditary breast and ovarian cancer syndrome.    Hereditary Breast and Ovarian Cancer Syndrome  (HBOC) is caused by a mutation in the BRCA1 or BRCA2 gene.  HBOC typically presents with multiple family members diagnosed with breast cancer before age 50 and/or ovarian cancer. Other cancer risks associated with HBOC include male breast cancer, prostate cancer, pancreatic cancer, and melanoma.     We also discussed about Field syndrome given the colon cancer history in Janet's maternal grandfather in addition to the ovarian cancer history in her mother.    Field syndrome can be caused by a mutation in one of five genes:  MLH1, MSH2, MSH6, PMS2, and EPCAM.  Field syndrome may present with polyps, but typically a small number.  The highest cancer risks associated with Field syndrome include colon cancer, endometrial/uterine cancer, gastric cancer, and ovarian cancer.    Based on her personal and family history, Janet meets current National Comprehensive Cancer Network (NCCN) criteria for genetic testing of BRCA1/BRCA2.      We discussed that there are additional genes that could cause increased risk for cancers in Janet's family.  As many of these genes present with overlapping features in a family and accurate cancer risk cannot always be established based upon the pedigree analysis alone, it would be reasonable for Janet to consider panel genetic testing to analyze multiple genes at once.     We discussed multiple different testing options. Janet was interested in obtaining as much information as possible.   Genetic testing is available for 34 genes associated with hereditary gynecologic and colon cancers: GYN/COLON EXPANDED (APC, MARK, AXIN2, BARD1, BMPR1A, BRCA1, BRCA2, BRIP1, CDH1, CHEK2, DICER1, EPCAM, GREM1, MLH1, MRE11A, MSH2, MSH3, MSH6, MUTYH, NBN, NF1, NTHL1, PALB2, PMS2, POLD1, POLE, PTEN, RAD50, RAD51C, RAD51D, SMAD4, SMARCA4, STK11, TP53).  We discussed that some of the genes in the GYN/COLON Expanded panel are associated with specific hereditary cancer syndromes and have published  management guidelines: Hereditary Breast and Ovarian Cancer syndrome (BRCA1, BRCA2), Field syndrome (MLH1, MSH2, MSH6, PMS2, EPCAM), Familial Adenomatous Polyposis (APC), Hereditary Diffuse Gastric Cancer (CDH1), Cowden syndrome (PTEN),  Juvenile Polyposis syndrome (BMPR1A, SMAD4), Li Fraumeni syndrome (TP53), Peutz-Jeghers syndrome (STK11), MUTYH Associated Polyposis syndrome (MUTYH), and Neurofibromatosis type 1 (NF1).    Risk-reducing salpingo-oophorectomy can be considered in women with mutations in BRIP1, RAD51C, or RAD51D.  Breast cancer risk management guidelines are available for MARK, CHEK2, PALB2, NF1, and NBN.  The AXIN2, CHEK2, GREM1, MSH3, NTHL1, POLD1, and POLE genes have also been associated with increased colon cancer risk and have published management guidelines.  The remaining genes (BARD1, DICER1, MRE11A, RAD50, and SMARCA4) are associated with increased cancer risk and may allow us to make medical recommendations when mutations are identified.    Consent was obtained.    Medical Management: For Janet, we reviewed that the information from genetic testing may determine:    surgery to treat Janet's active cancer diagnosis (i.e. lumpectomy versus bilateral mastectomy, partial versus total colectomy, etc.),    additional cancer screening for which Janet may qualify (i.e. mammogram and breast MRI, more frequent colonoscopies, more frequent dermatologic exams, etc.),    options for risk reducing surgeries Janet could consider (i.e. bilateral mastectomy, surgery to remove the ovaries and/or uterus, etc.),      and targeted chemotherapies for Matthieus if she were to develop certain cancers in the future (i.e. immunotherapy for individuals with Field syndrome, PARP inhibitors, etc.).     These recommendations and possible targeted chemotherapies will be discussed in detail once genetic testing is completed.     We discussed about the Genetic Information Nondiscrimination Act of 2008 (RON),  which is a federal law that protects individuals from genetic discrimination in health insurance and employment. We discussed that RON does not cover long-term care insurance life insurance, or disability insurance.     Janet expressed that she would like to obtain life insurance before proceeding with genetic testing.    We discussed that we can submit preauthorization to her insurance to get approval for genetic testing and in the meantime Janet will get her life insurance. Janet agreed to this plan.     Plan:  1) Preauthorization will be submitted for the Gyn/Colon Expanded panel.   2) Results from the preauthorization should be available in about 3 weeks.   3) Janet will contact me once she is ready to proceed with genetic testing.     Face to face time: 45 minutes    Stephanie Thompson MS, [facility]  Licensed Genetic Counselor  T: [phone]  F: [phone]

## 2018-11-08 ENCOUNTER — TELEPHONE (OUTPATIENT)
Dept: ONCOLOGY | Facility: CLINIC | Age: 38
End: 2018-11-08

## 2018-11-08 NOTE — TELEPHONE ENCOUNTER
November 8, 2018    I spoke with Janet regarding preauthorization for genetic testing. Based on her insurance company's genetic testing criteria for some of the genes we requested approval for, it is likely that coverage for the GYN/COLON panel will be denied. The insurance company did indicate that since Janet meets the criteria for BRCA1 and BRCA2 that if we request approval for these two that it will most likely get approved.    Once I informed Janet of this information, we discussed a few options. We discussed that we can consider sending genetic testing to another laboratory that has financial assistance to see if the rest of the test cost will be covered. We also discussed that we can request approval for BRCA1 and BRCA2 and Janet will be responsible for the cost of the rest of the testing. The estimated OOP for Janet was provided with the caveat that we will not know exactly what the OOP cost would be. Janet expressed understanding.    She indicated that she would like to request approval for BRCA1 and BRCA2 and pay for the rest of the panel out-of-pocket. I communicated with Janet's insurance about this change in plan. Approval for BRCA1 and BRCA2 is pending and most likely we will receive final decision within the next 24-48 hours. Janet will be informed of the final decision by our .     Janet also asked if there are more comprehensive genetic testing options that can be considered as she is motivated to know and get as much information as possible. We plan on discussing other options and whether larger panels are indicated given Janet's family history once Janet is ready to proceed with genetic testing.     Janet will give me a call to schedule an appointment and blood draw once she is ready.     Stephanie Thompson MS, [facility]  Licensed Genetic Counselor  T: [phone]  F: [phone]

## 2018-11-09 NOTE — TELEPHONE ENCOUNTER
FUTURE VISIT INFORMATION      FUTURE VISIT INFORMATION:    Date: 11.26.18    Time: 2:15 PM    Location: [facility]  REFERRAL INFORMATION:    Referring provider:  Dr. Robles    [facility]    Reason for visit/diagnosis  Allergic disorder    RECORDS REQUESTED FROM:       Clinic name Comments Records Status Imaging Status   [facility]

## 2018-11-15 ENCOUNTER — THERAPY VISIT (OUTPATIENT)
Dept: PHYSICAL THERAPY | Facility: CLINIC | Age: 38
End: 2018-11-15
Payer: COMMERCIAL

## 2018-11-15 DIAGNOSIS — M25.561 ACUTE PAIN OF RIGHT KNEE: Primary | ICD-10-CM

## 2018-11-15 DIAGNOSIS — M99.05 SOMATIC DYSFUNCTION OF PELVIC REGION: ICD-10-CM

## 2018-11-15 DIAGNOSIS — R39.89 BLADDER PAIN: ICD-10-CM

## 2018-11-15 DIAGNOSIS — N94.10 FEMALE DYSPAREUNIA: ICD-10-CM

## 2018-11-15 PROCEDURE — 97110 THERAPEUTIC EXERCISES: CPT | Mod: GP | Performed by: PHYSICAL THERAPIST

## 2018-11-15 PROCEDURE — 97530 THERAPEUTIC ACTIVITIES: CPT | Mod: GP | Performed by: PHYSICAL THERAPIST

## 2018-11-15 NOTE — MR AVS SNAPSHOT
After Visit Summary   11/15/2018    Janet Ryder    MRN: [id number]           Patient Information     Date Of Birth          1980        Visit Information        Provider Department      11/15/2018 9:30 AM Susana Ng, PT [place] [facility]        Today's Diagnoses     Acute pain of right knee    -  1    Somatic dysfunction of pelvic region        Female dyspareunia        Bladder pain           Follow-ups after your visit        Your next 10 appointments already scheduled     Nov 26, 2018  2:15 PM CST   (Arrive by 2:00 PM)   New Allergy with Christiano Zurita MD   [facility] for [facility] and [facility] ([facility])    [address]  [address]  [place] [id number] [phone]           Do not take anti-histamines or Zantac for seven day prior to your appointment.            Dec 12, 2018  4:45 PM CST   MR BRAIN W/O & W CONTRAST with [address] MRI ([facility])    [address]  1st Floor  [place] [id number] [phone]           How do I prepare for my exam? (Food and drink instructions) **If you will be receiving sedation or general anesthesia, please see special notes below.**  How do I prepare for my exam? (Other instructions) Take your medicines as usual, unless your doctor tells you not to. You may or may not receive intravenous (IV) contrast for this exam pending the discretion of the Radiologist.  You do not need to do anything special to prepare.  **If you will be receiving sedation or general anesthesia, please see special notes below.**  What should I wear: The MRI machine uses a strong magnet. Please wear clothes without metal (snaps, zippers). A sweatsuit works well, or we may give you a hospital gown. Please remove any body piercings and hair extensions before you arrive. You will also remove watches, jewelry, hairpins,  wallets, dentures, partial dental plates and hearing aids. You may wear contact lenses, and you may be able to wear your rings. We have a safe place to keep your personal items, but it is safer to leave them at home.  How long does the exam take: Most tests take 30 to 60 minutes.  HOWEVER, IF YOUR DOCTOR PRESCRIBES ANESTHESIA please plan on spending four to five hours in the recovery room.  What should I bring:  Bring a list of your current medicines to your exam (including vitamins, minerals and over-the-counter drugs).  Do I need a :  **If you will be receiving sedation or general anesthesia, please see special notes below.**  What should I do after the exam: No Restrictions, You may resume normal activities.  What is this test: MRI (magnetic resonance imaging) uses a strong magnet and radio waves to look inside the body. An MRA (magnetic resonance angiogram) does the same thing, but it lets us look at your blood vessels. A computer turns the radio waves into pictures showing cross sections of the body, much like slices of bread. This helps us see any problems more clearly. You may receive fluid (called  contrast ) before or during your scan. The fluid helps us see the pictures better. We give the fluid through an IV (small needle in your arm).  Who should I call with questions:  Please call the Imaging Department at your exam site with any questions. Directions, parking instructions, and other information is available on our website, [email or web address].  How do I prepare if I m having sedation or anesthesia? **IMPORTANT** THE INSTRUCTIONS BELOW ARE ONLY FOR THOSE PATIENTS WHO HAVE BEEN TOLD THEY WILL RECEIVE SEDATION OR GENERAL ANESTHESIA DURING THEIR MRI PROCEDURE:  IF YOU WILL RECEIVE SEDATION (take medicine to help you relax during your exam): You must get the medicine from your doctor before you arrive. Bring the medicine to the exam. Do not take it at home. Arrive one hour early. Bring someone who  can take you home after the test. Your medicine will make you sleepy. After the exam, you may not drive, take a bus or take a taxi by yourself. No eating 8 hours before your exam. You may have clear liquids up until 4 hours before your exam. (Clear liquids include water, clear tea, black coffee and fruit juice without pulp.)  IF YOU WILL RECEIVE ANESTHESIA (be asleep for your exam): Arrive 1 1/2 hours early. Bring someone who can take you home after the test. You may not drive, take a bus or take a taxi by yourself. No eating 8 hours before your exam. You may have clear liquids up until 4 hours before your exam. (Clear liquids include water, clear tea, black coffee and fruit juice without pulp.)            Feb 07, 2019  8:30 AM CST   (Arrive by 8:15 AM)   Return Visit with Emily Snell MD   [facility]rolog[facility] ([facility])    [address] [address] [phone]              Who to contact     If you have questions or need follow up information about today's clinic visit or your schedule please contact [place] [facility] PH[facility]ICAL THERAPY directly at [phone].  Normal or non-critical lab and imaging results will be communicated to you by MyChart, letter or phone within 4 business days after the clinic has received the results. If you do not hear from us within 7 days, please contact the clinic through MyChart or phone. If you have a critical or abnormal lab result, we will notify you by phone as soon as possible.  Submit refill requests through [employer] or call your pharmacy and they will forward the refill request to us. Please allow 3 business days for your refill to be completed.          Additional Information About Your Visit        [employer] Information     [employer] gives you secure access to your electronic health record. If you see a primary care provider, you can also  send messages to your care team and make appointments. If you have questions, please call your primary care clinic.  If you do not have a primary care provider, please call [phone] and they will assist you.        Care EveryWhere ID     This is your Care EveryWhere ID. This could be used by other organizations to access your [facility] medical records  [id number]         Blood Pressure from Last 3 Encounters:   10/04/18 108/75   09/27/18 105/69   09/18/18 114/70    Weight from Last 3 Encounters:   10/04/18 80.7 kg (178 lb)   09/27/18 81.4 kg (179 lb 6.4 oz)   09/26/18 80.3 kg (177 lb)              We Performed the Following     THERAPEUTIC ACTIVITIES     THERAPEUTIC EXERCISES        Primary Care Provider Office Phone # Fax #    Leatha Robles [phone]       [address] [address]        Equal Access to Services     JOE FORREST : Hadii wayne barcenaso Sokristine, waaxda luqadaha, qaybta kaalmada adearoldoyajeff, emiliano schwartz . So [facility] [phone].    ATENCIÓN: Si habla español, tiene a hernandez disposición servicios gratuitos de asistencia lingüística. Melodie al [phone].    We comply with applicable federal civil rights laws and [place] laws. We do not discriminate on the basis of race, color, national origin, age, disability, sex, sexual orientation, or gender identity.            Thank you!     Thank you for choosing [facility]  for your care. Our goal is always to provide you with excellent care. Hearing back from our patients is one way we can continue to improve our services. Please take a few minutes to complete the written survey that you may receive in the mail after your visit with us. Thank you!             Your Updated Medication List - Protect others around you: Learn how to safely use, store and throw away your medicines at www.disposemymeds.org.          This list is accurate as  of 11/15/18 10:14 AM.  Always use your most recent med list.                   Brand Name Dispense Instructions for use Diagnosis    buPROPion 150 MG 24 hr tablet    WELLBUTRIN XL    30 tablet    Take 1 tablet (150 mg) by mouth every morning    Adjustment disorder with anxious mood       COMPOUND CONTAINING CONTROLLED SUBSTANCE - PHARMACY TO MIX COMPOUNDED MEDICATION    CMPD RX    40 suppository    VAGINAL VALIUM SUPPOSITORY 5MG AT NIGHT AND PRIOR TO THERAPY AS NEEDED    Myalgia of pelvic floor, Pelvic floor dysfunction, Bladder pain       DAILY MULTIVITAMIN PO      Take  by mouth.        FISH OIL PO      Take  by mouth.        fluticasone 50 MCG/ACT spray    FLONASE    3 Package    Spray 1 spray into both nostrils daily.    Seasonal allergies       triamcinolone 0.1 % cream    KENALOG     CHANELLE EXT AA BID        VITAMIN D PO      Take 5,000 Units by mouth.

## 2018-11-16 ENCOUNTER — TELEPHONE (OUTPATIENT)
Dept: CONSULT | Facility: CLINIC | Age: 38
End: 2018-11-16

## 2018-11-16 NOTE — TELEPHONE ENCOUNTER
Notified Janet that we received prior authorization approval for genetic testing for genes BRCA1 and BRCA2. Valid from 11/7/18 to 2/4/19. Authorization number is [id number].     Explained that insurance benefits may still apply, therefore, there could be an out of pocket cost. Janet expressed understanding and stated that she wants to hold testing until she sorts out her life insurance. She still plans to proceed with testing before the end of the year. I provided my direct phone number for her to call when she is ready to proceed. Zenia had no further questions.    Mari Alvarado    Division of Genetics  [facility]  P: [phone]

## 2018-11-26 ENCOUNTER — PRE VISIT (OUTPATIENT)
Dept: PULMONOLOGY | Facility: CLINIC | Age: 38
End: 2018-11-26

## 2018-11-26 ENCOUNTER — OFFICE VISIT (OUTPATIENT)
Dept: PULMONOLOGY | Facility: CLINIC | Age: 38
End: 2018-11-26
Attending: ALLERGY & IMMUNOLOGY
Payer: COMMERCIAL

## 2018-11-26 VITALS
BODY MASS INDEX: 28.61 KG/M2 | WEIGHT: 178 LBS | OXYGEN SATURATION: 100 % | HEART RATE: 65 BPM | SYSTOLIC BLOOD PRESSURE: 111 MMHG | RESPIRATION RATE: 17 BRPM | HEIGHT: 66 IN | DIASTOLIC BLOOD PRESSURE: 75 MMHG

## 2018-11-26 DIAGNOSIS — T78.40XA ALLERGIC DISORDER, INITIAL ENCOUNTER: ICD-10-CM

## 2018-11-26 DIAGNOSIS — J31.0 CHRONIC RHINITIS: Primary | ICD-10-CM

## 2018-11-26 DIAGNOSIS — K90.49 FOOD INTOLERANCE IN ADULT: ICD-10-CM

## 2018-11-26 PROCEDURE — G0463 HOSPITAL OUTPT CLINIC VISIT: HCPCS

## 2018-11-26 PROCEDURE — 95004 PERQ TESTS W/ALRGNC XTRCS: CPT | Performed by: ALLERGY & IMMUNOLOGY

## 2018-11-26 NOTE — PROGRESS NOTES
"Reason for Visit  Janet Ryder is a 38 year old female who is referred by Leatha Robles for potential allergic etiology for systemic symptoms or rash, GI discomfort, and arthralgias.  Allergy HPI  The patient reports that for almost 10 years she has been having various somatic complaints of unclear etiology.  Predominate symptoms have been GI discomfort, various skin rashes, and arthralgias/myalgias.  She also reports a warmth sensation of the skin at times.  Rashes have been on the anterior and posterior chest and then also on the flexor and extensor surfaces of the elbows.  At times thought consistent with eczema per notes.  Painful rash treated in Feb/March of this year with topical steroid.  Rash lasted 6-8 weeks before resolution.  Most recently, itchy rash over the chest with bumps of painful eruptions.  Duration 2 weeks but largely resolved now with application of lotion.      She does not appreciate any triggers to any of her symptoms.  She does have seasonal allergies and is \"sick\" frequently with URI sounding symptoms.  She has taken flonase for may years due to seasonal symptoms of congestion, rhinitis, etc.      She has shellfish listed as an allergy but continue to eat it and does not become symptomatic.  She also reports an intolerance to eggplant where she vomits after eating it.      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  Does not note any changes in symptoms at work, home, or during travel.     She has had a reasonably extensive workup for rheumatologic etiologies of symptoms.  Has a borderline positive AUGUSTIN, speckled pattern, negative ANCA, RF, anti-CCP, ESR, CRP.  Normal TSH.    On review of Care Everywhere, she had negative allergy testing on 4/25/18 at [facility].  At that time, AUGUSTIN was negative, IGE was normal, CRP was 0.86, RF was normal, and ESR was 9.     The patient was seen and examined by Christiano Estrada MD   Current Outpatient Prescriptions "   Medication     buPROPion (WELLBUTRIN XL) 150 MG 24 hr tablet     Cholecalciferol (VITAMIN D PO)     Multiple Vitamin (DAILY MULTIVITAMIN PO)     Omega-3 Fatty Acids (FISH OIL PO)     fluticasone (FLONASE) 50 MCG/ACT nasal spray     No current facility-administered medications for this visit.      Allergies   Allergen Reactions     Shellfish-Derived Products Hives     Amoxicillin      Erythromycin      No Clinical Screening - See Comments Nausea and Vomiting     Eggplant     Scopolamine      Redness and swelling of the face     Social History     Social History     Marital status: Single     Spouse name: N/A     Number of children: N/A     Years of education: N/A     Occupational History      None           Social History Main Topics     Smoking status: Never Smoker     Smokeless tobacco: Never Used     Alcohol use Yes      Comment: 3 or 4 drinks per week     Drug use: No     Sexual activity: Yes     Partners: Male     Birth control/ protection: None     Other Topics Concern     Parent/Sibling W/ Cabg, Mi Or Angioplasty Before 65f 55m? No     Social History Narrative    Caffeine intake/servings daily - 1-2    Calcium intake/servings daily - 2+    Exercise 3 times weekly - describe swim, wts, basketball    Sunscreen used - Yes    Seatbelts used - Yes    Guns stored in the home - No    Self Breast Exam - No    Pap test up to date -  Yes    Eye exam up to date -  Yes    Dental exam up to date -  Yes    DEXA scan up to date -  Not Applicable    Flex Sig/Colonoscopy up to date -  Not Applicable    Mammography up to date -  Not Applicable    Immunizations reviewed and up to date - Yes    Abuse: Current or Past (Physical, Sexual or Emotional) - No    Do you feel safe in your environment - Yes    Do you cope well with stress - No    Do you suffer from insomnia - Yes    Last updated by: CARLOS CROSS  5/11/2011                         Past Medical History:   Diagnosis Date     Atypical migraine   "    Past Surgical History:   Procedure Laterality Date     C NONSPECIFIC PROCEDURE  AGE 6, AGE 10    REMOVAL OF FACIAL SKIN CYSTS     C NONSPECIFIC PROCEDURE  AGE 22    SUTURES FOR FACIAL LACERATION     ORTHOPEDIC SURGERY       Family History   Problem Relation Age of Onset     Hypertension Mother      Lung Cancer Mother      Ovarian Cancer Mother      Alcohol/Drug Father      GASTROINTESTINAL DISEASE Maternal Grandmother      cerosis of liver     Cancer - colorectal Maternal Grandfather      age 75     Cancer Maternal Grandfather      melanoma     Respiratory Maternal Grandfather      Glaucoma Maternal Grandfather      Cerebrovascular Disease Paternal Grandfather      Aneurysm Maternal Uncle      brain         ROS   A complete ROS was otherwise negative except as noted in the HPI and the end of the note.  /75  Pulse 65  Resp 17  Ht 1.676 m (5' 6\")  Wt 80.7 kg (178 lb)  SpO2 100%  BMI 28.73 kg/m2  Exam:   GENERAL APPEARANCE: Well developed, well nourished, alert, and in no apparent distress.  EYES: PERRL, EOMI, conjunctiva clear non-injected  HENT: Nasal mucosa with no edema and no discharge. No nasal polyps.  No facial tenderness.  EARS: Canals clear, TMs normal  MOUTH: Oral mucosa is moist, without any lesions, no tonsillar enlargement, no oropharyngeal exudate.  NECK: Supple, no masses, no thyromegaly.  LYMPHATICS: No significant cervical, or supraclavicular nodes.  RESP: Good air flow throughout.  No crackles. No rhonchi. No wheezes.  CV: Normal S1, S2, regular rhythm, normal rate. No murmur.  No rub. No gallop. No LE edema.   MS: Extremities normal. No clubbing. No cyanosis.  SKIN: Few acne on posterior thorax, no visible rash on anterior chest  NEURO: Speech normal, normal strength and tone, normal gait and stance  PSYCH: Normal mentation, orientation to person, place, and time.  Results: 10 percutaneous environmental and food allergen (and 2 controls) extracts placed by MA and read by MD.  "     Reviewed.     Limited SPT - negative except for control    Assessment and plan:   # Concern for Allergic disorder  Patient with several somatic and dermatologic complaints.  There does not appear to be an allergic cause to these.  SPT was completed and negative for all tested allergen.  Rash is not currently present and does not appear to be allergic in etiology based on our testing.  Nummular eczema may appear on the chest but patients description is less consistent.  Joint symptoms have not been shown to be related to allergies.  GI symptoms may be 2/2 allergies in rare situations like food pollen syndrome but SPT for culprit allergens was negative.      At this time the patients symptoms do not appear related to allergies and investigation elsewhere is recommended.       Patient seen and discussed with Dr. Zurita who agrees with the plan.     Denny Saeed MD, MPH  Pulmonary & Critical Care, [facility]  [phone]       Physician Attestation   I, Christiano Zurita, saw this patient with the resident and agree with the resident/fellow's findings and plan of care as documented in the note.          Christiano Zurita MD  Date of Service (when I saw the patient): Nov 26, 2018

## 2018-11-26 NOTE — LETTER
"11/26/2018       RE: Janet Ryder  [address]  [place] [address]     Dear Colleague,    Thank you for referring your patient, Janet Ryder, to the [facility]ENCE [facility] at [facility]. Please see a copy of my visit note below.    Reason for Visit  Janet Ryder is a 38 year old female who is referred by Leatha Robles for potential allergic etiology for systemic symptoms or rash, GI discomfort, and arthralgias.  Allergy HPI  The patient reports that for almost 10 years she has been having various somatic complaints of unclear etiology.  Predominate symptoms have been GI discomfort, various skin rashes, and arthralgias/myalgias.  She also reports a warmth sensation of the skin at times.  Rashes have been on the anterior and posterior chest and then also on the flexor and extensor surfaces of the elbows.  At times thought consistent with eczema per notes.  Painful rash treated in Feb/March of this year with topical steroid.  Rash lasted 6-8 weeks before resolution.  Most recently, itchy rash over the chest with bumps of painful eruptions.  Duration 2 weeks but largely resolved now with application of lotion.      She does not appreciate any triggers to any of her symptoms.  She does have seasonal allergies and is \"sick\" frequently with URI sounding symptoms.  She has taken flonase for may years due to seasonal symptoms of congestion, rhinitis, etc.      She has shellfish listed as an allergy but continue to eat it and does not become symptomatic.  She also reports an intolerance to eggplant where she vomits after eating it.      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  Does not note any changes in symptoms at work, home, or during travel.     She has had a reasonably extensive workup for rheumatologic etiologies of symptoms.  Has a borderline positive AUGUSTIN, speckled pattern, negative ANCA, RF, anti-CCP, " ESR, CRP.  Normal TSH.    On review of Care Everywhere, she had negative allergy testing on 4/25/18 at [facility].  At that time, AUGUSTIN was negative, IGE was normal, CRP was 0.86, RF was normal, and ESR was 9.     The patient was seen and examined by Christiano Estrada MD   Current Outpatient Prescriptions   Medication     buPROPion (WELLBUTRIN XL) 150 MG 24 hr tablet     Cholecalciferol (VITAMIN D PO)     Multiple Vitamin (DAILY MULTIVITAMIN PO)     Omega-3 Fatty Acids (FISH OIL PO)     fluticasone (FLONASE) 50 MCG/ACT nasal spray     No current facility-administered medications for this visit.      Allergies   Allergen Reactions     Shellfish-Derived Products Hives     Amoxicillin      Erythromycin      No Clinical Screening - See Comments Nausea and Vomiting     Eggplant     Scopolamine      Redness and swelling of the face     Social History     Social History     Marital status: Single     Spouse name: N/A     Number of children: N/A     Years of education: N/A     Occupational History      None           Social History Main Topics     Smoking status: Never Smoker     Smokeless tobacco: Never Used     Alcohol use Yes      Comment: 3 or 4 drinks per week     Drug use: No     Sexual activity: Yes     Partners: Male     Birth control/ protection: None     Other Topics Concern     Parent/Sibling W/ Cabg, Mi Or Angioplasty Before 65f 55m? No     Social History Narrative    Caffeine intake/servings daily - 1-2    Calcium intake/servings daily - 2+    Exercise 3 times weekly - describe swim, wts, basketball    Sunscreen used - Yes    Seatbelts used - Yes    Guns stored in the home - No    Self Breast Exam - No    Pap test up to date -  Yes    Eye exam up to date -  Yes    Dental exam up to date -  Yes    DEXA scan up to date -  Not Applicable    Flex Sig/Colonoscopy up to date -  Not Applicable    Mammography up to date -  Not Applicable    Immunizations reviewed and up to date - Yes    Abuse: Current  "or Past (Physical, Sexual or Emotional) - No    Do you feel safe in your environment - Yes    Do you cope well with stress - No    Do you suffer from insomnia - Yes    Last updated by: CARLOS CROSS  5/11/2011                         Past Medical History:   Diagnosis Date     Atypical migraine      Past Surgical History:   Procedure Laterality Date     C NONSPECIFIC PROCEDURE  AGE 6, AGE 10    REMOVAL OF FACIAL SKIN CYSTS     C NONSPECIFIC PROCEDURE  AGE 22    SUTURES FOR FACIAL LACERATION     ORTHOPEDIC SURGERY       Family History   Problem Relation Age of Onset     Hypertension Mother      Lung Cancer Mother      Ovarian Cancer Mother      Alcohol/Drug Father      GASTROINTESTINAL DISEASE Maternal Grandmother      cerosis of liver     Cancer - colorectal Maternal Grandfather      age 75     Cancer Maternal Grandfather      melanoma     Respiratory Maternal Grandfather      Glaucoma Maternal Grandfather      Cerebrovascular Disease Paternal Grandfather      Aneurysm Maternal Uncle      brain         ROS   A complete ROS was otherwise negative except as noted in the HPI and the end of the note.  /75  Pulse 65  Resp 17  Ht 1.676 m (5' 6\")  Wt 80.7 kg (178 lb)  SpO2 100%  BMI 28.73 kg/m2  Exam:   GENERAL APPEARANCE: Well developed, well nourished, alert, and in no apparent distress.  EYES: PERRL, EOMI, conjunctiva clear non-injected  HENT: Nasal mucosa with no edema and no discharge. No nasal polyps.  No facial tenderness.  EARS: Canals clear, TMs normal  MOUTH: Oral mucosa is moist, without any lesions, no tonsillar enlargement, no oropharyngeal exudate.  NECK: Supple, no masses, no thyromegaly.  LYMPHATICS: No significant cervical, or supraclavicular nodes.  RESP: Good air flow throughout.  No crackles. No rhonchi. No wheezes.  CV: Normal S1, S2, regular rhythm, normal rate. No murmur.  No rub. No gallop. No LE edema.   MS: Extremities normal. No clubbing. No cyanosis.  SKIN: Few acne on posterior " thorax, no visible rash on anterior chest  NEURO: Speech normal, normal strength and tone, normal gait and stance  PSYCH: Normal mentation, orientation to person, place, and time.  Results: 10 percutaneous environmental and food allergen (and 2 controls) extracts placed by MA and read by MD.      Reviewed.     Limited SPT - negative except for control    Assessment and plan:   # Concern for Allergic disorder  Patient with several somatic and dermatologic complaints.  There does not appear to be an allergic cause to these.  SPT was completed and negative for all tested allergen.  Rash is not currently present and does not appear to be allergic in etiology based on our testing.  Nummular eczema may appear on the chest but patients description is less consistent.  Joint symptoms have not been shown to be related to allergies.  GI symptoms may be 2/2 allergies in rare situations like food pollen syndrome but SPT for culprit allergens was negative.      At this time the patients symptoms do not appear related to allergies and investigation elsewhere is recommended.       Patient seen and discussed with Dr. Zurita who agrees with the plan.     Denny Saeed MD, MPH  Pulmonary & Critical Care, [facility]  [phone]       Physician Attestation   I, Christiano Zurita, saw this patient with the resident and agree with the resident/fellow's findings and plan of care as documented in the note.          Christiano Zurita MD  Date of Service (when I saw the patient): Nov 26, 2018

## 2018-11-26 NOTE — MR AVS SNAPSHOT
After Visit Summary   11/26/2018    Janet Ryder    MRN: [id number]           Patient Information     Date Of Birth          1980        Visit Information        Provider Department      11/26/2018 2:15 PM Christiano Zurita MD [facility]        Today's Diagnoses     Chronic rhinitis    -  1    Allergic disorder, initial encounter        Food intolerance in adult           Follow-ups after your visit        Your next 10 appointments already scheduled     Dec 12, 2018  4:45 PM CST   MR BRAIN W/O & W CONTRAST with [facility] [facility] ([facility])    [address] [place] [address] [phone]           How do I prepare for my exam? (Food and drink instructions) **If you will be receiving sedation or general anesthesia, please see special notes below.**  How do I prepare for my exam? (Other instructions) Take your medicines as usual, unless your doctor tells you not to. You may or may not receive intravenous (IV) contrast for this exam pending the discretion of the Radiologist.  You do not need to do anything special to prepare.  **If you will be receiving sedation or general anesthesia, please see special notes below.**  What should I wear: The MRI machine uses a strong magnet. Please wear clothes without metal (snaps, zippers). A sweatsuit works well, or we may give you a hospital gown. Please remove any body piercings and hair extensions before you arrive. You will also remove watches, jewelry, hairpins, wallets, dentures, partial dental plates and hearing aids. You may wear contact lenses, and you may be able to wear your rings. We have a safe place to keep your personal items, but it is safer to leave them at home.  How long does the exam take: Most tests take 30 to 60 minutes.  HOWEVER, IF YOUR DOCTOR PRESCRIBES ANESTHESIA please plan on spending four to five hours in the recovery  room.  What should I bring:  Bring a list of your current medicines to your exam (including vitamins, minerals and over-the-counter drugs).  Do I need a :  **If you will be receiving sedation or general anesthesia, please see special notes below.**  What should I do after the exam: No Restrictions, You may resume normal activities.  What is this test: MRI (magnetic resonance imaging) uses a strong magnet and radio waves to look inside the body. An MRA (magnetic resonance angiogram) does the same thing, but it lets us look at your blood vessels. A computer turns the radio waves into pictures showing cross sections of the body, much like slices of bread. This helps us see any problems more clearly. You may receive fluid (called  contrast ) before or during your scan. The fluid helps us see the pictures better. We give the fluid through an IV (small needle in your arm).  Who should I call with questions:  Please call the Imaging Department at your exam site with any questions. Directions, parking instructions, and other information is available on our website, "[employer]"/imaging.  How do I prepare if I m having sedation or anesthesia? **IMPORTANT** THE INSTRUCTIONS BELOW ARE ONLY FOR THOSE PATIENTS WHO HAVE BEEN TOLD THEY WILL RECEIVE SEDATION OR GENERAL ANESTHESIA DURING THEIR MRI PROCEDURE:  IF YOU WILL RECEIVE SEDATION (take medicine to help you relax during your exam): You must get the medicine from your doctor before you arrive. Bring the medicine to the exam. Do not take it at home. Arrive one hour early. Bring someone who can take you home after the test. Your medicine will make you sleepy. After the exam, you may not drive, take a bus or take a taxi by yourself. No eating 8 hours before your exam. You may have clear liquids up until 4 hours before your exam. (Clear liquids include water, clear tea, black coffee and fruit juice without pulp.)  IF YOU WILL RECEIVE ANESTHESIA (be asleep for your exam):  Arrive 1 1/2 hours early. Bring someone who can take you home after the test. You may not drive, take a bus or take a taxi by yourself. No eating 8 hours before your exam. You may have clear liquids up until 4 hours before your exam. (Clear liquids include water, clear tea, black coffee and fruit juice without pulp.)            Feb 07, 2019  8:30 AM CST   (Arrive by 8:15 AM)   Return Visit with Emily Snell MD   [facility] ([facility])    [address] [facility]  [address] [facility] [address] [phone]              Who to contact     If you have questions or need follow up information about today's clinic visit or your schedule please contact [facility] directly at [phone].  Normal or non-critical lab and imaging results will be communicated to you by MyChart, letter or phone within 4 business days after the clinic has received the results. If you do not hear from us within 7 days, please contact the clinic through [employer] or phone. If you have a critical or abnormal lab result, we will notify you by phone as soon as possible.  Submit refill requests through [employer] or call your pharmacy and they will forward the refill request to us. Please allow 3 business days for your refill to be completed.          Additional Information About Your Visit        MyChart Information     [employer] gives you secure access to your electronic health record. If you see a primary care provider, you can also send messages to your care team and make appointments. If you have questions, please call your primary care clinic.  If you do not have a primary care provider, please call [phone] and they will assist you.        Care EveryWhere ID     This is your Care EveryWhere ID. This could be used by other organizations to access your [place] medical records  [id number]        Your Vitals Were     Pulse  "Respirations Height Pulse Oximetry BMI (Body Mass Index)       65 17 1.676 m (5' 6\") 100% 28.73 kg/m2        Blood Pressure from Last 3 Encounters:   11/30/18 115/74   11/26/18 111/75   10/04/18 108/75    Weight from Last 3 Encounters:   11/30/18 82.3 kg (181 lb 6.4 oz)   11/26/18 80.7 kg (178 lb)   10/04/18 80.7 kg (178 lb)              We Performed the Following     Percutaneous test with allergic extract with number of test to be specified          Today's Medication Changes          These changes are accurate as of 11/26/18 11:59 PM.  If you have any questions, ask your nurse or doctor.               Stop taking these medicines if you haven't already. Please contact your care team if you have questions.     COMPOUND CONTAINING CONTROLLED SUBSTANCE - PHARMACY TO MIX COMPOUNDED MEDICATION   Commonly known as:  CMPD RX   Stopped by:  Christiano Zurita MD           triamcinolone 0.1 % external cream   Commonly known as:  KENALOG   Stopped by:  Christiano Zurita MD                    Primary Care Provider Office Phone # Fax #    Leatha Robles [phone]       [address] [address]        Equal Access to Services     JOE FORREST AH: Luis Enrique barcenaso Sokristine, waaxda luqadaha, qaybta kaalmada adeegyada, emiliano gracia. So [facility] [phone].    ATENCIÓN: Si habla español, tiene a hernandez disposición servicios gratuitos de asistencia lingüística. Llame al [phone].    We comply with applicable federal civil rights laws and [place] laws. We do not discriminate on the basis of race, color, national origin, age, disability, sex, sexual orientation, or gender identity.            Thank you!     Thank you for choosing [facility]  for your care. Our goal is always to provide you with excellent care. Hearing back from our patients is one way we can continue to improve our services. Please take a few " minutes to complete the written survey that you may receive in the mail after your visit with us. Thank you!             Your Updated Medication List - Protect others around you: Learn how to safely use, store and throw away your medicines at www.disposemymeds.org.          This list is accurate as of 11/26/18 11:59 PM.  Always use your most recent med list.                   Brand Name Dispense Instructions for use Diagnosis    buPROPion 150 MG 24 hr tablet    WELLBUTRIN XL    30 tablet    Take 1 tablet (150 mg) by mouth every morning    Adjustment disorder with anxious mood       DAILY MULTIVITAMIN PO      Take  by mouth.        FISH OIL PO      Take  by mouth.        fluticasone 50 MCG/ACT nasal spray    FLONASE    3 Package    Spray 1 spray into both nostrils daily.    Seasonal allergies       VITAMIN D PO      Take 5,000 Units by mouth.

## 2018-11-30 ENCOUNTER — OFFICE VISIT (OUTPATIENT)
Dept: FAMILY MEDICINE | Facility: CLINIC | Age: 38
End: 2018-11-30
Payer: COMMERCIAL

## 2018-11-30 VITALS
OXYGEN SATURATION: 100 % | RESPIRATION RATE: 12 BRPM | BODY MASS INDEX: 29.28 KG/M2 | SYSTOLIC BLOOD PRESSURE: 115 MMHG | DIASTOLIC BLOOD PRESSURE: 74 MMHG | HEART RATE: 61 BPM | WEIGHT: 181.4 LBS

## 2018-11-30 DIAGNOSIS — N63.20 LEFT BREAST LUMP: ICD-10-CM

## 2018-11-30 DIAGNOSIS — M79.10 MYALGIA: ICD-10-CM

## 2018-11-30 DIAGNOSIS — R21 RASH: ICD-10-CM

## 2018-11-30 DIAGNOSIS — F33.41 RECURRENT MAJOR DEPRESSIVE DISORDER, IN PARTIAL REMISSION (H): Primary | ICD-10-CM

## 2018-11-30 RX ORDER — BUPROPION HYDROCHLORIDE 150 MG/1
150 TABLET, EXTENDED RELEASE ORAL DAILY
Qty: 180 TABLET | Refills: 3 | Status: SHIPPED | OUTPATIENT
Start: 2018-11-30 | End: 2019-05-30

## 2018-11-30 NOTE — Clinical Note
Cameron Pena - can you please track her mammogram and US to make sure that she is getting this done. Mary

## 2018-11-30 NOTE — PATIENT INSTRUCTIONS
Here is the plan from today's visit    1. Recurrent major depressive disorder, in partial remission (H)  Switched to the SR - start with 150 mg in the am.    - buPROPion (WELLBUTRIN SR) 150 MG 12 hr tablet; Take 1 tablet (150 mg) by mouth daily  Dispense: 180 tablet; Refill: 3    2. Myalgia      3. Rash - upload your pictures and I will send to our dermatologist     4. I will find out the referral to Elimination Diet.     5. Breast referral for US.       Please call or return to clinic if your symptoms don't go away.    Follow up plan      Thank you for coming to [facility] today.  Lab Testing:  **If you had lab testing today and your results are reassuring or normal they will be mailed to you or sent through [employer] within 7 days.   **If the lab tests need quick action we will call you with the results.  The phone number we will call with results is # [phone] (home) . If this is not the best number please call our clinic and change the number.  Medication Refills:  If you need any refills please call your pharmacy and they will contact us.   If you need to  your refill at a new pharmacy, please contact the new pharmacy directly. The new pharmacy will help you get your medications transferred faster.   Scheduling:  If you have any concerns about today's visit or wish to schedule another appointment please call our office during normal business hours [phone] (8-5:00 M-F)  If a referral was made to a [facility] and you don't get a call from central scheduling please call [phone].  If a Mammogram was ordered for you at [facility] call [phone] to schedule or change your appointment.  If you had an XRay/CT/Ultrasound/MRI ordered the number is [phone] to schedule or change your radiology appointment.   Medical Concerns:  If you have urgent medical concerns please call [phone] at any time of the day.  If you have a medical emergency please call [phone].

## 2018-11-30 NOTE — PROGRESS NOTES
CHARO       Janet is a 38 year old  female  who presents:    Chief Complaint   Patient presents with     Mass     lump in left breast     RECHECK     anti depression medication   Breast- lesion       Antidepressant - feels like she is drinking too much coffee.  Is on the 150 mg daily XL. ? If 12 hour one is better. Rather have just the once a day. Does not feel less depressed but has more energy.      Continues to feel systemically poor when menstruates - first day has marked nausea. Takes NSAID and helps.     Saw rheum and told her not rheumatologic.   Told that symptoms are not due to allergy. Her rash does not typically itch but burns.  Had recurrence of a chest rash that burned.     Saw a dermatologist when had a rash and told that it was eczema. Not excited to be seen again by a provider who tells her she is fine. Continues with a sense of short episodes pain over her left forehead that is accompanied by skin changes.      Always feeling cold - and skin is warm. No fever or sweats and hasn't lost weight.     Bladder - is doing better. Gets bladder spasms that are less frequent.     Has had 2 months of a left breast tender mass that she is pretty sure is a cyst. Has had one drained before. Known fibrocystic breast disease.  Was seen by genetics who recommended she be tested for BRCA1/2.        Family History   Problem Relation Age of Onset     Hypertension Mother      Lung Cancer Mother      Ovarian Cancer Mother      Alcohol/Drug Father      GASTROINTESTINAL DISEASE Maternal Grandmother      cerosis of liver     Cancer - colorectal Maternal Grandfather      age 75     Cancer Maternal Grandfather      melanoma     Respiratory Maternal Grandfather      Glaucoma Maternal Grandfather      Cerebrovascular Disease Paternal Grandfather      Aneurysm Maternal Uncle      brain       Patient Active Problem List   Diagnosis     [facility] SPRAIN SACROILIAC     [facility] SPRAIN THORACIC REGION     Lumbosacral ligament sprain      [facility] OTHER BACK SYMPTOMS     Contraception     CARDIOVASCULAR SCREENING; LDL GOAL LESS THAN 160     Abdominal pain     Seasonal allergies     Atypical migraine     Rosacea     Iron deficiency     Interstitial cystitis     Family history of malignant neoplasm of ovary     Acute pain of right knee     Bladder pain     Urgency-frequency syndrome     Dyspareunia in female     Osteoarthritis resulting from right hip dysplasia     Somatic dysfunction of pelvic region     Female dyspareunia     Tear of right acetabular labrum, initial encounter       Current Outpatient Prescriptions   Medication Sig Dispense Refill     buPROPion (WELLBUTRIN XL) 150 MG 24 hr tablet Take 1 tablet (150 mg) by mouth every morning 30 tablet 1     Cholecalciferol (VITAMIN D PO) Take 5,000 Units by mouth.       Multiple Vitamin (DAILY MULTIVITAMIN PO) Take  by mouth.       Omega-3 Fatty Acids (FISH OIL PO) Take  by mouth.       fluticasone (FLONASE) 50 MCG/ACT nasal spray Spray 1 spray into both nostrils daily. (Patient not taking: Reported on 11/26/2018) 3 Package 3          Allergies   Allergen Reactions     Shellfish-Derived Products Hives     Amoxicillin      Erythromycin      No Clinical Screening - See Comments Nausea and Vomiting     Eggplant     Scopolamine      Redness and swelling of the face                Review of Systems:               Physical Exam:     Vitals:    11/30/18 1501   BP: 115/74   Pulse: 61   Resp: 12   SpO2: 100%   Weight: 181 lb 6.4 oz (82.3 kg)     Body mass index is 29.28 kg/(m^2).  Vitals were reviewed and were normal  GENERAL: healthy, alert, well nourished, well hydrated, no distress  NECK: no tenderness, no adenopathy, no asymmetry, no masses, no stiffness; thyroid- normal to palpation  BREAST: tender, smooth, tense about 4 cm mass in left breast at around 10:00, no nipple discharge, no palpable axillary masses or adenopathy  CV: regular rates and rhythm, normal S1 S2, no S3 or S4 and no murmur, no click or  rub -  ABDOMEN: soft, no tenderness, no  hepatosplenomegaly, no masses, normal bowel sounds  MS: extremities- no gross deformities noted, no edema    Office Visit on 10/04/2018   Component Date Value Ref Range Status     Color Urine 10/04/2018 Yellow   Final     Appearance Urine 10/04/2018 Clear   Final     Glucose Urine 10/04/2018 Neg  neg mg/dL Final     Bilirubin Urine 10/04/2018 Neg  neg Final     Ketones Urine 10/04/2018 Neg  neg mg/dL Final     Specific Gravity Urine 10/04/2018 1.025  1.003 - 1.035 Final     Blood Urine 10/04/2018 Mod  neg Final     pH Urine 10/04/2018 6.5  5.0 - 7.0 pH Final     Protein Albumin Urine 10/04/2018 Neg  neg mg/dL Final     Urobilinogen Urine 10/04/2018 0.2  0.2 - 1.0 EU/dL Final     Nitrite Urine 10/04/2018 Neg  NEG Final     Leukocyte Esterase Urine 10/04/2018 Neg  NEG Final     Source 10/04/2018 [place]   Final         Assessment and Plan     Janet was seen today for mass and recheck.    Diagnoses and all orders for this visit:    Recurrent major depressive disorder, in partial remission (H) - agree with her that would do better on the SR. Changed. She is not ready to increase. Missed doing PHQ9 so will do at next visit.  Is sensitive to hormonal changes and so floated idea of OCP to use continuously to see if that helps; will think about it.   -     buPROPion (WELLBUTRIN SR) 150 MG 12 hr tablet; Take 1 tablet (150 mg) by mouth daily    Myalgia with Rash - she is pretty frustrated. Reviewed that she meets criteria more for fibromyalgia - which she doesn't really agree with. Willing to explore. Recommended that she upload the pictures she has of her rash - which to me looked more like flushing, vascular instability.  I could ask one of our derm colleagues to look at the pictures.      Left breast lump - needs eval and drain.  Referral made.   -     US Breast Left Limited 1-3 Quadrants; Future  -     Diagnostic Mammogram Digital Bilateral; Future        Medications  Discontinued During This Encounter   Medication Reason     buPROPion (WELLBUTRIN XL) 150 MG 24 hr tablet        Options for treatment and follow-up care were reviewed with the patient . Janet Ryder  engaged in the decision making process and verbalized understanding of the options discussed and agreed with the final plan.    Leatha Robles MD

## 2018-11-30 NOTE — MR AVS SNAPSHOT
After Visit Summary   11/30/2018    Janet Ryder    MRN: [id number]           Patient Information     Date Of Birth          1980        Visit Information        Provider Department      11/30/2018 2:40 PM Leatha Robles MD [facility]        Today's Diagnoses     Recurrent major depressive disorder, in partial remission (H)    -  1    Myalgia        Rash        Left breast lump          Care Instructions    Here is the plan from today's visit    1. Recurrent major depressive disorder, in partial remission (H)  Switched to the SR - start with 150 mg in the am.    - buPROPion (WELLBUTRIN SR) 150 MG 12 hr tablet; Take 1 tablet (150 mg) by mouth daily  Dispense: 180 tablet; Refill: 3    2. Myalgia      3. Rash - upload your pictures and I will send to our dermatologist     4. I will find out the referral to [facility]lim[facility].     5. Breast referral for US.       Please call or return to clinic if your symptoms don't go away.    Follow up plan      Thank you for coming to [facility] today.  Lab Testing:  **If you had lab testing today and your results are reassuring or normal they will be mailed to you or sent through [facility] within 7 days.   **If the lab tests need quick action we will call you with the results.  The phone number we will call with results is # [phone] (home) . If this is not the best number please call our clinic and change the number.  Medication Refills:  If you need any refills please call your pharmacy and they will contact us.   If you need to  your refill at a new pharmacy, please contact the new pharmacy directly. The new pharmacy will help you get your medications transferred faster.   Scheduling:  If you have any concerns about today's visit or wish to schedule another appointment please call our office during normal business hours [phone] (8-5:00 M-F)  If a referral was made to a [facility] and you don't  get a call from central scheduling please call [phone].  If a Mammogram was ordered for you at [facility] call [phone] to schedule or change your appointment.  If you had an XRay/CT/Ultrasound/MRI ordered the number is [phone] to schedule or change your radiology appointment.   Medical Concerns:  If you have urgent medical concerns please call [phone] at any time of the day.  If you have a medical emergency please call 911.          Follow-ups after your visit        Your next 10 appointments already scheduled     Dec 12, 2018  4:45 PM CST   MR BRAIN W/O & W CONTRAST with [address] MRI ([facility])    [address]  1st Floor  [facility] [address] [phone]           How do I prepare for my exam? (Food and drink instructions) **If you will be receiving sedation or general anesthesia, please see special notes below.**  How do I prepare for my exam? (Other instructions) Take your medicines as usual, unless your doctor tells you not to. You may or may not receive intravenous (IV) contrast for this exam pending the discretion of the Radiologist.  You do not need to do anything special to prepare.  **If you will be receiving sedation or general anesthesia, please see special notes below.**  What should I wear: The MRI machine uses a strong magnet. Please wear clothes without metal (snaps, zippers). A sweatsuit works well, or we may give you a hospital gown. Please remove any body piercings and hair extensions before you arrive. You will also remove watches, jewelry, hairpins, wallets, dentures, partial dental plates and hearing aids. You may wear contact lenses, and you may be able to wear your rings. We have a safe place to keep your personal items, but it is safer to leave them at home.  How long does the exam take: Most tests take 30 to 60 minutes.  HOWEVER, IF YOUR DOCTOR PRESCRIBES ANESTHESIA please plan on spending four to  five hours in the recovery room.  What should I bring:  Bring a list of your current medicines to your exam (including vitamins, minerals and over-the-counter drugs).  Do I need a :  **If you will be receiving sedation or general anesthesia, please see special notes below.**  What should I do after the exam: No Restrictions, You may resume normal activities.  What is this test: MRI (magnetic resonance imaging) uses a strong magnet and radio waves to look inside the body. An MRA (magnetic resonance angiogram) does the same thing, but it lets us look at your blood vessels. A computer turns the radio waves into pictures showing cross sections of the body, much like slices of bread. This helps us see any problems more clearly. You may receive fluid (called  contrast ) before or during your scan. The fluid helps us see the pictures better. We give the fluid through an IV (small needle in your arm).  Who should I call with questions:  Please call the Imaging Department at your exam site with any questions. Directions, parking instructions, and other information is available on our website, [email or web address].  How do I prepare if I m having sedation or anesthesia? **IMPORTANT** THE INSTRUCTIONS BELOW ARE ONLY FOR THOSE PATIENTS WHO HAVE BEEN TOLD THEY WILL RECEIVE SEDATION OR GENERAL ANESTHESIA DURING THEIR MRI PROCEDURE:  IF YOU WILL RECEIVE SEDATION (take medicine to help you relax during your exam): You must get the medicine from your doctor before you arrive. Bring the medicine to the exam. Do not take it at home. Arrive one hour early. Bring someone who can take you home after the test. Your medicine will make you sleepy. After the exam, you may not drive, take a bus or take a taxi by yourself. No eating 8 hours before your exam. You may have clear liquids up until 4 hours before your exam. (Clear liquids include water, clear tea, black coffee and fruit juice without pulp.)  IF YOU WILL RECEIVE ANESTHESIA (be  asleep for your exam): Arrive 1 1/2 hours early. Bring someone who can take you home after the test. You may not drive, take a bus or take a taxi by yourself. No eating 8 hours before your exam. You may have clear liquids up until 4 hours before your exam. (Clear liquids include water, clear tea, black coffee and fruit juice without pulp.)            Feb 07, 2019  8:30 AM CST   (Arrive by 8:15 AM)   Return Visit with Emily Snell MD   [facility] ([facility])    [address]  4th Floor  [facility] [address] [phone]              Future tests that were ordered for you today     Open Future Orders        Priority Expected Expires Ordered    US Breast Left Limited 1-3 Quadrants Routine  11/30/2019 11/30/2018    Diagnostic Mammogram Digital Bilateral Routine  11/30/2019 11/30/2018            Who to contact     Please call your clinic at [phone] to:    Ask questions about your health    Make or cancel appointments    Discuss your medicines    Learn about your test results    Speak to your doctor            Additional Information About Your Visit        REscour Information     [employer]sc[employer] gives you secure access to your electronic health record. If you see a primary care provider, you can also send messages to your care team and make appointments. If you have questions, please call your primary care clinic.  If you do not have a primary care provider, please call [phone] and they will assist you.      REscour is an electronic gateway that provides easy, online access to your medical records. With REscour, you can request a clinic appointment, read your test results, renew a prescription or communicate with your care team.     To access your existing account, please contact your [facility] or call [phone] for assistance.        Care EveryWhere ID     This is your Care EveryWhere ID.  This could be used by other organizations to access your [place] medical records  [id number]        Your Vitals Were     Pulse Respirations Pulse Oximetry BMI (Body Mass Index)          61 12 100% 29.28 kg/m2         Blood Pressure from Last 3 Encounters:   11/30/18 115/74   11/26/18 111/75   10/04/18 108/75    Weight from Last 3 Encounters:   11/30/18 181 lb 6.4 oz (82.3 kg)   11/26/18 178 lb (80.7 kg)   10/04/18 178 lb (80.7 kg)                 Today's Medication Changes          These changes are accurate as of 11/30/18  3:34 PM.  If you have any questions, ask your nurse or doctor.               Start taking these medicines.        Dose/Directions    buPROPion 150 MG 12 hr tablet   Commonly known as:  WELLBUTRIN SR   Used for:  Recurrent major depressive disorder, in partial remission (H)   Replaces:  buPROPion 150 MG 24 hr tablet   Started by:  Leatha Robles MD        Dose:  150 mg   Take 1 tablet (150 mg) by mouth daily   Quantity:  180 tablet   Refills:  3         Stop taking these medicines if you haven't already. Please contact your care team if you have questions.     buPROPion 150 MG 24 hr tablet   Commonly known as:  WELLBUTRIN XL   Replaced by:  buPROPion 150 MG 12 hr tablet   Stopped by:  Leatha Robles MD                Where to get your medicines      These medications were sent to [facility] [address] [place] [address] AT [address] [address]     Phone:  [phone]     buPROPion 150 MG 12 hr tablet                Primary Care Provider Office Phone # Fax #    Leatha Robles [phone]       [address] [address]        Equal Access to Services     JOE FORREST AH: Luis Enrique Alfred, wawinterda lucarmelo, qaybta kaalmada winter, emiliano gracia. So [facility] [phone].    ATENCIÓN: Si habla español, tiene a hernandez disposición servicios  miguel de asistencia lingüística. Melodie carpio [phone].    We comply with applicable federal civil rights laws and [place] laws. We do not discriminate on the basis of race, color, national origin, age, disability, sex, sexual orientation, or gender identity.            Thank you!     Thank you for choosing [facility]  for your care. Our goal is always to provide you with excellent care. Hearing back from our patients is one way we can continue to improve our services. Please take a few minutes to complete the written survey that you may receive in the mail after your visit with us. Thank you!             Your Updated Medication List - Protect others around you: Learn how to safely use, store and throw away your medicines at www.disposemymeds.org.          This list is accurate as of 11/30/18  3:34 PM.  Always use your most recent med list.                   Brand Name Dispense Instructions for use Diagnosis    buPROPion 150 MG 12 hr tablet    WELLBUTRIN SR    180 tablet    Take 1 tablet (150 mg) by mouth daily    Recurrent major depressive disorder, in partial remission (H)       DAILY MULTIVITAMIN PO      Take  by mouth.        FISH OIL PO      Take  by mouth.        fluticasone 50 MCG/ACT nasal spray    FLONASE    3 Package    Spray 1 spray into both nostrils daily.    Seasonal allergies       VITAMIN D PO      Take 5,000 Units by mouth.

## 2018-12-12 ENCOUNTER — ANCILLARY PROCEDURE (OUTPATIENT)
Dept: MAMMOGRAPHY | Facility: CLINIC | Age: 38
End: 2018-12-12
Attending: FAMILY MEDICINE
Payer: COMMERCIAL

## 2018-12-12 ENCOUNTER — ANCILLARY PROCEDURE (OUTPATIENT)
Dept: MAMMOGRAPHY | Facility: CLINIC | Age: 38
End: 2018-12-12
Payer: COMMERCIAL

## 2018-12-12 DIAGNOSIS — N63.20 LEFT BREAST LUMP: ICD-10-CM

## 2018-12-12 RX ORDER — LIDOCAINE HYDROCHLORIDE 10 MG/ML
10 INJECTION, SOLUTION EPIDURAL; INFILTRATION; INTRACAUDAL; PERINEURAL ONCE
Status: COMPLETED | OUTPATIENT
Start: 2018-12-12 | End: 2018-12-12

## 2018-12-12 RX ADMIN — LIDOCAINE HYDROCHLORIDE 10 ML: 10 INJECTION, SOLUTION EPIDURAL; INFILTRATION; INTRACAUDAL; PERINEURAL at 15:38

## 2019-02-01 ENCOUNTER — PRE VISIT (OUTPATIENT)
Dept: UROLOGY | Facility: CLINIC | Age: 39
End: 2019-02-01

## 2019-02-01 NOTE — TELEPHONE ENCOUNTER
Reason for visit: medication follow up     Relevant information: vaginal valium for pelvic pain    Records/imaging/labs/orders: all records available    Pt called: no need for a call    At Rooming: regular

## 2019-05-23 ENCOUNTER — MYC MEDICAL ADVICE (OUTPATIENT)
Dept: FAMILY MEDICINE | Facility: CLINIC | Age: 39
End: 2019-05-23

## 2019-05-23 DIAGNOSIS — F39 MOOD DISORDER (H): Primary | ICD-10-CM

## 2019-05-24 RX ORDER — BUPROPION HYDROCHLORIDE 75 MG/1
75 TABLET ORAL 2 TIMES DAILY
Qty: 60 TABLET | Refills: 11 | Status: SHIPPED | OUTPATIENT
Start: 2019-05-24 | End: 2020-04-23

## 2019-07-16 ENCOUNTER — OFFICE VISIT (OUTPATIENT)
Dept: FAMILY MEDICINE | Facility: CLINIC | Age: 39
End: 2019-07-16
Payer: COMMERCIAL

## 2019-07-16 ENCOUNTER — TELEPHONE (OUTPATIENT)
Dept: FAMILY MEDICINE | Facility: CLINIC | Age: 39
End: 2019-07-16

## 2019-07-16 VITALS
TEMPERATURE: 98 F | HEART RATE: 76 BPM | HEIGHT: 67 IN | RESPIRATION RATE: 16 BRPM | OXYGEN SATURATION: 99 % | BODY MASS INDEX: 27.97 KG/M2 | DIASTOLIC BLOOD PRESSURE: 73 MMHG | SYSTOLIC BLOOD PRESSURE: 110 MMHG | WEIGHT: 178.2 LBS

## 2019-07-16 DIAGNOSIS — R21 LOCALIZED PAPULAR RASH: ICD-10-CM

## 2019-07-16 DIAGNOSIS — R51.9 HEADACHE ABOVE THE EYE REGION: Primary | ICD-10-CM

## 2019-07-16 RX ORDER — DESONIDE 0.5 MG/G
OINTMENT TOPICAL 2 TIMES DAILY
Qty: 15 G | Refills: 0 | Status: SHIPPED | OUTPATIENT
Start: 2019-07-16 | End: 2020-04-23

## 2019-07-16 RX ORDER — VALACYCLOVIR HYDROCHLORIDE 500 MG/1
500 TABLET, FILM COATED ORAL 3 TIMES DAILY
Qty: 9 TABLET | Refills: 3 | Status: SHIPPED | OUTPATIENT
Start: 2019-07-16 | End: 2020-11-04

## 2019-07-16 ASSESSMENT — ENCOUNTER SYMPTOMS
NECK PAIN: 0
MYALGIAS: 1
COUGH: 0
CONSTIPATION: 0
FEVER: 0
SINUS PRESSURE: 0
FATIGUE: 1
PHOTOPHOBIA: 0
TROUBLE SWALLOWING: 0
SINUS PAIN: 1
BACK PAIN: 0
CHILLS: 0
EYE REDNESS: 0
NAUSEA: 0
ARTHRALGIAS: 0
LIGHT-HEADEDNESS: 0
FACIAL SWELLING: 0
SORE THROAT: 1
HEADACHES: 1
SHORTNESS OF BREATH: 0
WHEEZING: 0
NUMBNESS: 0
VOMITING: 0
APPETITE CHANGE: 0
DIARRHEA: 0
ACTIVITY CHANGE: 0
ABDOMINAL PAIN: 0

## 2019-07-16 ASSESSMENT — MIFFLIN-ST. JEOR: SCORE: 1531.06

## 2019-07-16 NOTE — TELEPHONE ENCOUNTER
Prior Authorization Retail Medication Request    Medication/Dose: desonide (DESOWEN) 0.05 % external ointment  ICD code (if different than what is on RX):  Localized papular rash [R21]     Previously Tried and Failed:  See chart  Rationale:  See chart    Insurance Name:  [facility]  Insurance ID:  [id number]      Pharmacy Information (if different than what is on RX)  Name:  [facility]  Phone:  [phone]

## 2019-07-16 NOTE — PATIENT INSTRUCTIONS
1. I think we should try treating this as a herpes or shingles even though it is atypical. Take three pills a day for 3 days right at the start of sympptoms.     2. For your rash try the desonide steroid cream.     3. Use a Neti pot  3 times week and flonase daily.     4. Can consider ENT if the above is not working.     Thank you for coming to [facility].    **If you had lab testing today and your results are reassuring or normal they will be be mailed to you or sent to your MyChart and you will be notified by email within 7 days.   **If the lab tests need quick action we will call you with the results.  The phone number we will call with results is # [phone] (home)    (home) . If this is not the best number please call our clinic and change the number.  **If any referrals were ordered today you should be getting a call in the next week.  If you don't hear about this within a week please call one of the following numbers   If your referral is for [facility] please call [phone]  If your referral is to outside [facility] please call the clinic number ([phone]) and ask for your team care coordinator.  If you need any refills please call your pharmacy and they will contact us.  If you have any concerns about today's visit or wish to schedule another appointment  please call our office during normal business hours  [phone] (8-5:00 M-F)  If you have urgent medical concerns please call [phone] at any time of the day.  If you have a medical emergency please call [phone]    Again thank you for choosing [facility] and please let us know how we can best partner with you to improve your and your family's health.

## 2019-07-16 NOTE — PROGRESS NOTES
HPI       Janet Ryder is a 39 year old  who presents for   Chief Complaint   Patient presents with     Derm Problem     she noticed a rash behind her right ear last friday 7-12-19. Nothing is new in her life.     URI     She has been sick for a few months now, cough, body aches, whole right side of face hurts.     URI recurrent 2-3 weeks every couple of months. Cough ,myalgias, facial pain. No fevers, chills. Same as previous worked up presentations. Sometimes presents with rashes that typically are a faint papular rash no her left forehead and rashes around joints that sometimes aches. Extensive lab work revealed AUGUSTIN mildly positive but other wise negative rheum workup including inflammatory markers, normal TSH. Seen by dermatology diagnosed with eczema, rheumatology did not think there was autoimmune cause of conditions.    Derm  - her post auricular rash is on right ear and began 5 days ago. New location for her. Aches but not too painful to touch. No exposures, headgear, new chemicals,shampoos, soaps. Feels similar to rash she has had on other sides. Is same side as her facial pain.           Problem, Medication and Allergy Lists were      Patient Active Problem List    Diagnosis Date Noted     Tear of right acetabular labrum, initial encounter 09/27/2018     Priority: Medium     Somatic dysfunction of pelvic region 07/27/2018     Priority: Medium     Female dyspareunia 07/27/2018     Priority: Medium     Bladder pain 06/21/2018     Priority: Medium     Urgency-frequency syndrome 06/21/2018     Priority: Medium     Dyspareunia in female 06/21/2018     Priority: Medium     Osteoarthritis resulting from right hip dysplasia 06/21/2018     Priority: Medium     Acute pain of right knee 06/12/2018     Priority: Medium     Interstitial cystitis 05/14/2018     Priority: Medium     Family history of malignant neoplasm of ovary 05/14/2018     Priority: Medium     Rosacea 04/25/2018     Priority: Medium      Atypical migraine      Priority: Medium     Seasonal allergies 10/18/2012     Priority: Medium     Iron deficiency 12/09/2011     Priority: Medium     Overview:   12/11: Fe 87, , % sat 19, Ferritin 27, 2/12:  Fe 113, , Sat 27, Ferritin 30       Abdominal pain 10/12/2011     Priority: Medium     CARDIOVASCULAR SCREENING; LDL GOAL LESS THAN 160 10/31/2010     Priority: Medium     Contraception 12/05/2008     Priority: Medium     Lumbosacral ligament sprain 05/24/2006     Priority: Medium     Problem list name updated by automated process. Provider to review       [facility] OTHER BACK SYMPTOMS 05/24/2006     Priority: Medium     [facility] SPRAIN THORACIC REGION 03/01/2006     Priority: Medium     [facility] SPRAIN SACROILIAC 09/19/2005     Priority: Medium   ,     Current Outpatient Medications   Medication Sig Dispense Refill     buPROPion (WELLBUTRIN) 75 MG tablet Take 1 tablet (75 mg) by mouth 2 times daily 180 tablet 1     desonide (DESOWEN) 0.05 % external ointment Apply topically 2 times daily 15 g 0     fluticasone (FLONASE) 50 MCG/ACT nasal spray Spray 1 spray into both nostrils daily. 3 Package 3     valACYclovir (VALTREX) 500 MG tablet Take 1 tablet (500 mg) by mouth 3 times daily for 3 days 9 tablet 3     buPROPion (WELLBUTRIN) 75 MG tablet Take 1 tablet (75 mg) by mouth 2 times daily (Patient not taking: Reported on 7/16/2019) 60 tablet 11     Cholecalciferol (VITAMIN D PO) Take 5,000 Units by mouth.       Multiple Vitamin (DAILY MULTIVITAMIN PO) Take  by mouth.       Omega-3 Fatty Acids (FISH OIL PO) Take  by mouth.     ,     Allergies   Allergen Reactions     Shellfish-Derived Products Hives     Amoxicillin      Erythromycin      No Clinical Screening - See Comments Nausea and Vomiting     Eggplant     Scopolamine      Redness and swelling of the face   .    Patient is an established patient of this clinic..         Review of Systems:   Review of Systems   Constitutional: Positive for fatigue. Negative for  "activity change, appetite change, chills and fever.   HENT: Positive for congestion, ear pain, sinus pain and sore throat. Negative for facial swelling, mouth sores, postnasal drip, sinus pressure, tinnitus and trouble swallowing.    Eyes: Negative for photophobia, redness and visual disturbance.   Respiratory: Negative for cough, shortness of breath and wheezing.    Cardiovascular: Negative for chest pain and leg swelling.   Gastrointestinal: Negative for abdominal pain, constipation, diarrhea, nausea and vomiting.   Musculoskeletal: Positive for myalgias. Negative for arthralgias, back pain and neck pain.   Skin: Positive for rash.   Neurological: Positive for headaches. Negative for light-headedness and numbness.            Physical Exam:     Vitals:    07/16/19 0940   BP: 110/73   BP Location: Right arm   Patient Position: Sitting   Cuff Size: Adult Regular   Pulse: 76   Resp: 16   Temp: 98  F (36.7  C)   TempSrc: Oral   SpO2: 99%   Weight: 80.8 kg (178 lb 3.2 oz)   Height: 1.71 m (5' 7.32\")     Body mass index is 27.64 kg/m .  Vitals were reviewed and were normal     Physical Exam   Constitutional: She is oriented to person, place, and time. She appears well-developed and well-nourished. No distress.   HENT:   Head: Normocephalic and atraumatic.   Right Ear: External ear normal.   Left Ear: External ear normal.   Clear TM bilaterally. External ear canals normal. Post auricular rash following curvature of ear not involving hair line. Pinpoint papules with normal appearing skin underlying. Approx 1inch of rash total. No pustules or vesicles    Neck: No JVD present.   Cardiovascular: Normal rate and regular rhythm.   No murmur heard.  Pulmonary/Chest: Effort normal. No respiratory distress. She has no wheezes.   Abdominal: Soft. Bowel sounds are normal. She exhibits no mass.   Musculoskeletal: Normal range of motion. She exhibits no edema.   Lymphadenopathy:     She has no cervical adenopathy.   Neurological: She " is alert and oriented to person, place, and time. No cranial nerve deficit.   Skin: Skin is warm. Capillary refill takes less than 2 seconds. Rash noted.   Psychiatric:   milldy anxious         Results:   No testing ordered today    Assessment and Plan        1. Headache above the eye region    2. Localized papular rash  Hx of recurrent URI/sinus symptoms that don't appear bacterial on chart review. Typically giadnosed as URI. Often have associated rashes. Certaintly could be viral pro drome or flare of eczema with Uri type symptoms. 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 Suggest netti pot and daily flonase with Follow-up in 1 month. No signs of bacterial sinusitis that would warrant abx treatment. Has had prior rheum workup as well.     One etiology that could explain painful rash that always appears in fore head and may have a prodromal pain which she endorses is a typical shnigles or herpes. Given valcyclovir low risk will give patient 500 TID x3 day dosing to start at onset of rash pain to see if this reduces syptoms (usually around 1 week).     Patient likely to have simple allergic triad with dermatitis and URI/allergic symptoms. Reinforced this as possible diagnosis given negative workup for past year. Will re examine if above work up doesnt't improve symptoms. Could consider ENT referral but unsure what they would be able to add other than visualisation of sinuses.    for current rash will try low potency steroid.   - valACYclovir (VALTREX) 500 MG tablet; Take 1 tablet (500 mg) by mouth 3 times daily for 3 days  Dispense: 9 tablet; Refill: 3  - desonide (DESOWEN) 0.05 % external ointment; Apply topically 2 times daily  Dispense: 15 g; Refill: 0    1. I think we should try treating this as a herpes or shingles even though it is atypical. Take three pills a day for 3 days right at the  start of sympptoms.     2. For your rash try the desonide steroid cream.     3. Use a Neti pot  3 times week and flonase daily.     4. Can consider ENT if the above is not working.      There are no discontinued medications.    Options for treatment and follow-up care were reviewed with the patient. Janet Ryder  engaged in the decision making process and verbalized understanding of the options discussed and agreed with the final plan.    Pal Hopkins MD

## 2019-07-16 NOTE — PROGRESS NOTES
Recurring URI symptoms- warmth, headache, sore throat, sinuses are congested     Rash posterior to right ear extending down the neck - last friday  Itchy, slightly painful  New and different rash      Usually gets rashes on chest and extensor surfaces of arms when sick     Rashes and URI symptoms intermittently for the last year   Usually occur concurrently   2-3 month cycle of being sick for 2 weeks      Face rashes occur more infrequently, last one is 4 months ago  Usually on the forehead on the left side- looks identical and in same location every time it recurs        No sinus pain  cear lungs  Clear earsRecurring URI symptoms- warmth, headache, sore throat, sinuses are congested     Rash posterior to right ear extending down the neck - last friday  Itchy, slightly painful  New and different rash      Usually gets rashes on chest and extensor surfaces of arms when sick     Rashes and URI symptoms intermittently for the last year   Usually occur concurrently   2-3 month cycle of being sick for 2 weeks      Face rashes occur more infrequently, last one is 4 months ago  Usually on the forehead on the left side- looks identical and in same location every time it recurs        No sinus pain  cear lungs  Clear ears  Clear nose  Clear oropharynx     Office work, only gets cleaned once a week     Flonase helps with congestions         :: netipot to decrease allergen load, flonase daily for symptoms  :: steroid cream  Clear nose  Clear oropharynx     Office work, only gets cleaned once a week     Flonase helps with congestions         :: netipot to decrease allergen load, flonase daily for symptoms  :: steroid cream

## 2019-07-16 NOTE — PROGRESS NOTES
Recurring URI symptoms- warmth, headache, sore throat, sinuses are congested    Rash posterior to right ear extending down the neck - last friday  Itchy, slightly painful  New and different rash     Usually gets rashes on chest and extensor surfaces of arms when sick    Rashes and URI symptoms intermittently for the last year   Usually occur concurrently   2-3 month cycle of being sick for 2 weeks     Face rashes occur more infrequently, last one is 4 months ago  Usually on the forehead on the left side- looks identical and in same location every time it recurs      No sinus pain  cear lungs  Clear ears  Clear nose  Clear oropharynx    Office work, only gets cleaned once a week    Flonase helps with congestions       :: netipot to decrease allergen load, flonase daily for symptoms  :: steroid cream

## 2019-07-17 NOTE — PROGRESS NOTES
Preceptor Attestation:   Patient seen, evaluated and discussed with the resident. I have verified the content of the note, which accurately reflects my assessment of the patient and the plan of care.   Supervising Physician:  Eliud Chou MD

## 2019-07-23 NOTE — TELEPHONE ENCOUNTER
PA Initiation    Medication: desonide (DESOWEN) 0.05 % external ointment -   Insurance Company:    Pharmacy Filling the Rx: [facility] [id number] [place] [address] [address] AT [address]  Filling Pharmacy Phone: [phone]  Filling Pharmacy Fax: [phone]  Start Date: 7/23/2019

## 2019-07-23 NOTE — TELEPHONE ENCOUNTER
Prior Authorization Approval    Medication: desonide (DESOWEN) 0.05 % external ointment - APPROVED was approved on 7/23/2019  Effective:   to    Reference #: PA Case [id number]  Approved Dose/Quantity:   Insurance Company:    Expected CoPay:    Pharmacy Filling the Rx: [facility] [id number] [place], [place] [id number] AT [address]  Pharmacy Notified: Yes  Patient Notified: Comment:  **Instructed pharmacy to notify patient when script is ready to /ship.**

## 2019-08-26 PROBLEM — M99.05 SOMATIC DYSFUNCTION OF PELVIC REGION: Status: RESOLVED | Noted: 2018-07-27 | Resolved: 2018-10-31

## 2019-08-26 PROBLEM — N94.10 FEMALE DYSPAREUNIA: Status: RESOLVED | Noted: 2018-07-27 | Resolved: 2018-10-31

## 2019-08-26 PROBLEM — M25.561 ACUTE PAIN OF RIGHT KNEE: Status: RESOLVED | Noted: 2018-06-12 | Resolved: 2018-10-31

## 2019-08-26 PROBLEM — R39.89 BLADDER PAIN: Status: RESOLVED | Noted: 2018-06-21 | Resolved: 2018-10-31

## 2019-08-26 NOTE — PROGRESS NOTES
Patient did not return for further treatment and no additional progress was noted.  Please refer to the progress note and goal flowsheet completed on 10/31/18 for discharge information.

## 2019-11-04 ENCOUNTER — ALLIED HEALTH/NURSE VISIT (OUTPATIENT)
Dept: NURSING | Facility: CLINIC | Age: 39
End: 2019-11-04
Payer: COMMERCIAL

## 2019-11-04 DIAGNOSIS — Z23 NEED FOR PROPHYLACTIC VACCINATION AND INOCULATION AGAINST INFLUENZA: Primary | ICD-10-CM

## 2019-11-04 PROCEDURE — 90471 IMMUNIZATION ADMIN: CPT | Performed by: FAMILY MEDICINE

## 2019-11-04 PROCEDURE — 90686 IIV4 VACC NO PRSV 0.5 ML IM: CPT | Performed by: FAMILY MEDICINE

## 2019-11-04 PROCEDURE — 99207 ZZC NO CHARGE NURSE ONLY: CPT | Performed by: FAMILY MEDICINE

## 2019-11-06 ENCOUNTER — HEALTH MAINTENANCE LETTER (OUTPATIENT)
Age: 39
End: 2019-11-06

## 2020-03-24 ENCOUNTER — VIRTUAL VISIT (OUTPATIENT)
Dept: FAMILY MEDICINE | Facility: OTHER | Age: 40
End: 2020-03-24

## 2020-03-24 ENCOUNTER — NURSE TRIAGE (OUTPATIENT)
Dept: NURSING | Facility: CLINIC | Age: 40
End: 2020-03-24

## 2020-03-24 ENCOUNTER — TELEPHONE (OUTPATIENT)
Dept: FAMILY MEDICINE | Facility: CLINIC | Age: 40
End: 2020-03-24

## 2020-03-24 NOTE — PROGRESS NOTES
"Date: 2020 13:12:21  Clinician: Sue Verma  Clinician NPI: [id number]  Patient: Immanuel Ryder  Patient : 1980  Patient Address: [address] [place] [address]  Patient Phone: [phone]  Visit Protocol: URI  Patient Summary:  Immanuel is a 40 year old ( : 1980 ) female who initiated a Visit for COVID-19 (Coronavirus) evaluation and screening. When asked the question \"Please sign me up to receive news, health information and promotions from [employer].\", Immanuel responded \"No\".    Immanuel states her symptoms started gradually 10-13 days ago.   Her symptoms consist of enlarged lymph nodes, myalgia, a sore throat, a cough, malaise, chills, and a headache. She is experiencing mild difficulty breathing with activities but can speak normally in full sentences.   Symptom details     Cough: Immanuel coughs a few times an hour and her cough is not more bothersome at night. Phlegm does not come into her throat when she coughs. She does not believe her cough is caused by post-nasal drip.     Sore throat: Immanuel reports having moderate throat pain (4-6 on a 10 point pain scale), does not have exudate on her tonsils, and can swallow liquids. The lymph nodes in her neck are enlarged. A rash has not appeared on the skin since the sore throat started.     Headache: She states the headache is moderate (4-6 on a 10 point pain scale).      Immanuel denies having ear pain, rhinitis, facial pain or pressure, wheezing, nasal congestion, teeth pain, and fever. She also denies double sickening (worsening symptoms after initial improvement), taking antibiotic medication for the symptoms, and having recent facial or sinus surgery in the past 60 days.   Precipitating events  Within the past week, Immanuel has not been exposed to someone with strep throat. She has not recently been exposed to someone with influenza. Immanuel has not been in close contact with any high risk individuals.   Pertinent " COVID-19 (Coronavirus) information  Immanuel has not traveled internationally or to the areas where COVID-19 (Coronavirus) is widespread, including cruise ship travel in the last 14 days before the start of her symptoms.   Immanuel has not had a close contact with a laboratory-confirmed COVID-19 patient within 14 days of symptom onset. She has had a close contact with a suspected COVID-19 patient within 14 days of symptom onset. Additional information about contact with COVID-19 (Coronavirus) patient as reported by the patient (free text): They were tested but have not received results.   Immanuel is not a healthcare worker and does not work in a healthcare facility.   Pertinent medical history  Immanuel does not get yeast infections when she takes antibiotics.   Immanuel does not need a return to work/school note.   Weight: 185 lbs   Immanuel does not smoke or use smokeless tobacco.   She denies pregnancy and denies breastfeeding. She has menstruated in the past month.   Additional information as reported by the patient (free text): I have had pain in my lower right lung and back since onset.  Occasional pain in left lung. Shortness of breath has, aches and fatigue have persisted.  I get dizzy and feel like I'm about to pass out a few times a day.   Weight: 185 lbs    MEDICATIONS: bupropion HCl oral, ALLERGIES: azithromycin, scopolamine  Clinician Response:  Dear Immanuel,   Based on the information you have provided, you do have symptoms that are consistent with Coronavirus (COVID-19).  The coronavirus causes mild to severe respiratory illness with the most common symptoms including fever, cough and difficulty breathing. Unfortunately, many viruses cause similar symptoms and it can be difficult to distinguish between viruses, especially in mild cases, so we are presuming that anyone with cough or fever has coronavirus at this time.  Coronavirus/COVID-19 has reached the point of community spread in [place],  meaning that we are finding the virus in people with no known exposure risk for juani the virus. Given the increasing commonness of coronavirus in the community we are no longer testing patients who are not critically ill.  If you are a health care worker, you should refer to your employee health office for instructions about testing and returning to work.  For everyone else who has cough or fever, you should assume you are infected with coronavirus. Since you will not be tested but have symptoms that may be consistent with coronavirus, the CDC recommends you stay in self-isolation until these three things have happened:    You have had no fever for at least 72 hours (that is three full days of no fever without the use of medicine that reduces fevers)    AND   Other symptoms have improved (for example, when your cough or shortness of breath have improved)   AND   At least 7 days have passed since your symptoms first appeared.   How to Isolate:   Isolate yourself at home.  Do Not allow any visitors  Do Not go to work or school  Do Not go to Denominational,  centers, shopping, or other public places.  Do Not shake hands.  Avoid close contact with others (hugging, kissing).   Protect Others:   Cover Your Mouth and Nose with a mask, disposable tissue or wash cloth to avoid spreading germs to others.  Wash your hands and face frequently with soap and water.   We know it can be scary to hear that you might have COVID-19. Our team can help track your symptoms and make sure you are doing ok over the next two weeks using a program called [employer] to keep in touch. When you receive an email from [employer], please consider enrolling in our monitoring program. There is no cost to you for monitoring. Here is a URL where you can learn more: [email or web address]  Managing Symptoms:   At this time, we primarily recommend Tylenol (Acetaminophen) for fever or pain. If you have liver or kidney problems, contact  your primary care provider for instructions on use of tylenol. Adults can take 650 mg (two 325 mg pills) by mouth every 4-6 hours as needed OR 1,000 mg (two 500 mg pills) every 8 hours as needed. MAXIMUM DAILY DOSE: 3,000mg. For children, refer to dosing on bottle based on age or weight.   If you develop significant shortness of breath that prevents you from doing normal activities, please call 911 or proceed to the nearest emergency room and alert them immediately that you have been in self-isolation for possible coronavirus.  For more information about COVID19 and options for caring for yourself at home, please visit the CDC website at https://www.cdc.gov/coronavirus/2019-ncov/about/steps-when-sick.htmlFor more options for care at [facility], please visit our website at [email or web address]    Diagnosis: Cough  Diagnosis ICD: R05

## 2020-03-25 NOTE — TELEPHONE ENCOUNTER
Message Return    3/24/2020  8:50 PM    Message returned by Ceferino Walters MD    Patient: Janet Ryder   Phone number-  [phone] (home)       return their call  Phone conversation with: Patient    Situation: Janet Ryder  Is a 40 year old  female who is calling because of  Chest pain in the setting of COVID-like symptoms.    Background : Pt has had URI symptoms for 14 days. Not tested for COVID, but exposed to someone being tested. She has also had chest pain and mild shortness of breath along with those symptoms for the duration of her symptoms. The shortness of breath has somewhat worsened recently though and she has worsening right lung pain. Pain is constant and not associated with breathing. She is able to do her normal activities without significant shortness of breath currently, but gets shortness of breath when she walks fast or up stairs. She had fevers initially, but these are now resolved.     Assessment: Chest pain and shortness of breath associated with URI/possible COVID. Symptoms could be explained by pleuritic chest pain, but constant nature makes this less likely. No concerning symptoms for ACS/PE. COVID is not ruled out at this point, but she does not have significant shortness of breath, severe weakness, or inability to tolerate po so she does not meet criteria to go to the Emergency Room.     Recommendation/Plan: I recommended continued close monitoring of her symptoms, and use of tylenol and over the counter cough medicines. If she worsens to the point of any of the above criteria, I advised she go in to the ED to be seen. Otherwise if she continues to have symptoms I advised a telephone encounter at \[facility]\"" to consider abx or other therapies.    Rod Walters MD  [facility]  PGY2, [phone]

## 2020-03-25 NOTE — TELEPHONE ENCOUNTER
Patient calling - says she has had COVID 19 symptoms since 3/10/20.  Says she is worried that she is having pain in her right lung towards the bottom, side and back.  And occasional pain in left side.  Says she has had shortness of breath and other symptoms since 3/10/20 but this pain is new.    Says she already did a virtual visit and was told to treat her symptoms at home and to call back or call 911 if she has difficulty breathing.    Patient denies triage at this time.  Is requesting telephone visit. Patient warm transferred to scheduling to set up telephone visit.  Scheduling staff states patient will need to call her PCP's office.  Patient agrees.    Advised patient to call back if difficulty breathing occurs.    Angelic Finney, RN  Triage Nurse Advisor      COVID 19 Nurse Triage Plan    Please be aware that novel coronavirus (COVID-19) may be circulating in the community. If you develop symptoms such as fever, cough, or SOB or if you have concerns about the presence of another infection including coronavirus (COVID-19), please contact your health care provider or visit [email or web address].     Patient HAS known exposure, fever, cough or SOB in addition to reason for call.   Patient to be scheduled for a Telephone Visit (if [facility] unavailable or declined). **Follow System Ambulatory Workflow for COVID 19 and route message to appropriate pool location **    Instructions Given to Patient  It is recommended that you complete a telephone visit with one of our virtual providers.      The clinic will be contacting you to complete the telephone visit with a provider (if after hours, you will receive a call back within 24 hours). If you do not receive a phone call within 24 hours, please call us back.     Regardless of if you have been tested or not:  Patient who have symptoms (cough, fever, or shortness of breath), need to isolate for 7 days from when symptoms started AND 72 hours after fever resolves (without fever  reducing medications) AND improvement of respiratory symptoms (whichever is longer).      Isolate yourself at home (in own room/own bathroom if possible)    Do Not allow any visitors    Do Not go to work or school    Do Not go to Sikhism,  centers, shopping, or other public places.    Do Not shake hands.    Avoid close and intimate contact with others (hugging, kissing).    Follow CDC recommendations for household cleaning of frequently touched services.     After the initial 7 days, continue to isolate yourself from household members as much as possible. To continue decrease the risk of community spread and exposure, you and any members of your household should limit activities in public for 14 days after starting home isolation.     You can reference the following CDC link for helpful home isolation/care tips:  https://www.cdc.gov/coronavirus/2019-ncov/downloads/10Things.pdf    Protect Others:    Cover Your Mouth and Nose with a mask, disposable tissue or wash cloth to avoid spreading germs to others.    Wash your hands and face frequently with soap and water.    Fever Medicines:    For fever relief, take acetaminophen or ibuprofen.    Treat fevers above 101  F (38.3  C) to lower fevers and make you more comfortable.     Acetaminophen (e.g., Tylenol): Take 650 mg (two 325 mg pills) by mouth every 4-6 hours as needed of regular strength Tyleno or 1,000 mg (two 500 mg pills) every 8 hours as needed of Extra Strength Tylenol.     Ibuprofen (e.g., Motrin, Advil): Take 400 mg (two 200 mg pills) by mouth every 6 hours as needed.     Acetaminophen is thought to be safer than ibuprofen or naproxen for people over 65 years old. Acetaminophen is in many OTC and prescription medicines. It might be in more than one medicine that you are taking. You need to be careful and not take an overdose. Before taking any medicine, read all the instructions on the package.    Caution -NSAIDs (e.g., ibuprofen, naproxen): Do not  take nonsteroidal anti-inflammatory drugs (NSAIDs) if you have stomach problems, kidney disease, heart failure, or other contraindications to using this type of medicine. Do not take NSAID medicines for over 7 days without consulting your PCP. Do not take NSAID medicines if you are pregnant. Do not take NSAID medicines if you are also taking blood thinners.     Call Back If: Breathing difficulty develops or you become worse.    Thank you for limiting contact with others, wearing a simple mask to cover your cough, practice good hand hygiene habits and accessing our virtual services where possible to limit the spread of this virus.    For more information about COVID19 and options for caring for yourself at home, please visit the CDC website at https://www.cdc.gov/coronavirus/2019-ncov/about/steps-when-sick.html  For more options for care at [facility], please visit our website at [email or web address]

## 2020-03-30 DIAGNOSIS — F33.41 RECURRENT MAJOR DEPRESSIVE DISORDER, IN PARTIAL REMISSION (H): ICD-10-CM

## 2020-03-30 NOTE — TELEPHONE ENCOUNTER
Verify that the refill encounter hasn't been started Yes    [facility] phone call message- patient requesting a refill:    Full Medication Name: buPROPion (WELLBUTRIN) 75 MG tablet    Dose:      Pharmacy confirmed as   [facility]  [address]  [facility] [address]  Phone: [phone] Fax: [phone]    [id number] - [address] AT [address] [address]  Phone: [phone] Fax: [phone]    [facility] [id number] [place] [facility] [place], [place] - [address] [place] [address]  Phone: [phone] Fax: [phone]  : No:     Medication tab checked to see if medication has been sent  Yes    Additional Comments: pt request Rx refill pt request [facility] rx mailed    OK to leave a message on voice mail? Yes    Advised patient refill may take up to 2 business days? Yes    Primary language: English      needed? No    Call taken on March 30, 2020 at 4:30 PM by John Mckenna    Route to P [facility] MED REFILL

## 2020-03-31 RX ORDER — BUPROPION HYDROCHLORIDE 75 MG/1
75 TABLET ORAL 2 TIMES DAILY
Qty: 180 TABLET | Refills: 0 | Status: SHIPPED | OUTPATIENT
Start: 2020-03-31 | End: 2020-04-29

## 2020-03-31 NOTE — TELEPHONE ENCOUNTER
"Request for medication refill: buPROPion (WELLBUTRIN) 75 MG tablet     Providers if patient needs an appointment and you are willing to give a one month supply please refill for one month and  send a letter/MyChart using \".SMILLIMITEDREFILL\" .smillimited and route chart to \"[facility] \" (Giving one month refill in non controlled medications is strongly recommended before denial)    If refill has been denied, meaning absolutely no refills without visit, please complete the smart phrase \".smirxrefuse\" and route it to the \"[facility] MED REFILLS\"  pool to inform the patient and the pharmacy.    Rahul Thompson MA        "

## 2020-04-23 ENCOUNTER — VIRTUAL VISIT (OUTPATIENT)
Dept: FAMILY MEDICINE | Facility: CLINIC | Age: 40
End: 2020-04-23
Payer: COMMERCIAL

## 2020-04-23 DIAGNOSIS — R07.81 PLEURITIC CHEST PAIN: ICD-10-CM

## 2020-04-23 DIAGNOSIS — F39 MOOD DISORDER (H): ICD-10-CM

## 2020-04-23 DIAGNOSIS — U07.1 COVID-19: ICD-10-CM

## 2020-04-23 DIAGNOSIS — Z71.89 ADVICE GIVEN ABOUT COVID-19 VIRUS BY TELEPHONE: Primary | ICD-10-CM

## 2020-04-23 RX ORDER — BUPROPION HYDROCHLORIDE 75 MG/1
75 TABLET ORAL 2 TIMES DAILY
Qty: 180 TABLET | Refills: 0 | Status: SHIPPED | OUTPATIENT
Start: 2020-04-23 | End: 2020-04-29

## 2020-04-23 NOTE — PROGRESS NOTES
Preceptor Attestation:   Patient spoken to, evaluated and discussed with the resident. I have verified the content of the note, which accurately reflects my assessment of the patient and the plan of care.   Supervising Physician:  Alejandra Black MD

## 2020-04-23 NOTE — PROGRESS NOTES
"Family Medicine Telephone Visit Note           Telephone Visit Consent   Patient was verbally read the following and verbal consent was obtained.    \"This telephone visit will be conducted via a call between you and your physician/provider. We have found that certain health care needs can be provided without the need for a physical exam.  This service lets us provide the care you need with a short phone conversation.  If a prescription is necessary we can send it directly to your pharmacy.  If lab work is needed we can place an order for that and you can then stop by our lab to have the test done at a later time.    Telephone visits are billed at different rates depending on your insurance coverage. During this emergency period, for some insurers they may be billed the same as an in-person visit.  Please reach out to your insurance provider with any questions.    If during the course of the call the physician/provider feels a telephone visit is not appropriate, you will not be charged for this service.\"    Name person giving consent:  Patient   Date verbal consent given:  4/23/2020  Time verbal consent given:  8:28 AM       Chief Complaint   Patient presents with     Follow Up     Pt states to have done a e-visit with [facility], was not contacted so pt contacted the number provided and spoke with someone on how to take care of self.      Pain     x5-6weeks, in right region of back, pt states to have a consistant pain around the right lung area.     Medication Request     Wellbutrin            HPI   Patients name: Janet  Appointment start time:  8:42 AM      Concern for COVID 19  Patient is concerned about persistent symptoms with presumptive COVID 19 infection last month. Reports that she has been sick for quite some time.  At one point in time she had bilateral lung pain and the left side has not resolved.  Since then she continues to have pain in her right lung that was worse when she was at the height of her " sickness.  She states that the pain is at the bottom of her right lung.  She is on a lower than the ibuprofen that she was concerned that it may exacerbate her COVID infection.  She reports having mild and intermittent episodes of shortness of breath, but denies any fevers or chills over the past week or so.  Her first symptoms presented around March 12.  She reports exacerbation of her pain with deep breaths and feels like she cannot hold her breath for very long.  She wants to know about the potential long-term consequences secondary to COVID.  She denies any smoking history or use of e-cigarettes.  She has utilized Tylenol a few times with some improvement.  Reports pain overall is achy.  She does have a mother with a history of lung cancer at the age of 67 but that was secondary to extensive smoking history.  She reports that her  had similar symptoms but they have all self resolved.  She denies any phlegm.  Has not been coughing for over the past 1 to 2 weeks.  Patient is very concerned that she has an ongoing pneumonia.      Additionally was requesting a transfer of her Wellbutrin.  Wanted medication to be sent to a delivery pharmacy.  Reports that she is on the last couple tablets.  She does have a follow-up with her primary provider next week. Did confirm with prior pharmacy that she did  Wellbutrin last month.            Current Outpatient Medications   Medication Sig Dispense Refill     buPROPion (WELLBUTRIN) 75 MG tablet Take 1 tablet (75 mg) by mouth 2 times daily 180 tablet 0     buPROPion (WELLBUTRIN) 75 MG tablet Take 1 tablet (75 mg) by mouth 2 times daily 180 tablet 0     Cholecalciferol (VITAMIN D PO) Take 5,000 Units by mouth.       fluticasone (FLONASE) 50 MCG/ACT nasal spray Spray 1 spray into both nostrils daily. 3 Package 3     Multiple Vitamin (DAILY MULTIVITAMIN PO) Take  by mouth.       Omega-3 Fatty Acids (FISH OIL PO) Take  by mouth.       desonide (DESOWEN) 0.05 % external  ointment Apply topically 2 times daily (Patient not taking: Reported on 4/23/2020) 15 g 0     valACYclovir (VALTREX) 500 MG tablet Take 1 tablet (500 mg) by mouth 3 times daily for 3 days 9 tablet 3     Allergies   Allergen Reactions     Shellfish-Derived Products Hives     Amoxicillin      Erythromycin      No Clinical Screening - See Comments Nausea and Vomiting     Eggplant     Scopolamine      Redness and swelling of the face           Assessment and Plan   1. Mood disorder (H)  New prescription was sent to [facility] a pharmacy.  Patient declined phone number to pharmacy in case no medication was delivered in the next few days.  - buPROPion (WELLBUTRIN) 75 MG tablet; Take 1 tablet (75 mg) by mouth 2 times daily  Dispense: 180 tablet; Refill: 0    2. Advice Given About Covid-19 Virus by Telephone  3. COVID-19  4. Pleuritic chest pain  Extensive amount of time spent with patient discussing ongoing symptom.  Overall most symptoms have completely resolved or improved.  Discussed with patient most likely etiology of her pain is pleuritic due to inflammatory process of COVID-19.  Many questions of long-term prognosis of lung function secondary to the virus, but stated that we are still continuing to learn about this novel virus.  Encouraged patient to utilize ibuprofen or other NSAIDs to aid with inflammation.  Patient hesitant as mixed literature has been published on social media about it exacerbating COVID symptoms.  Discussed with patient as she is well over a month out from start of her symptoms and would less likely cause exacerbation as well as no definitive literature in regards to ibuprofen exacerbating symptoms.  Discussed with patient less likely ongoing pneumonia with her general presentation including no fever, no cough no sputum production, no ongoing shortness of breath inability to speak full sentences with provider. Patient quite focused on this pain, even though it has overall improved. No  other sick contacts beyond spouse. If continues to persist could consider in person exam with potential CXR. Other etiology to consider is PE, though again no SOB. Discussed after hours line for [facility].      After Visit Information:  Patient chose to view AVS via [employer]    Return if symptoms worsen or fail to improve.    Appointment end time: 9:03 AM  This is a telephone visit that took 21 minutes.      Clinician location:  [facility]    Michelle Sullivan MD  [facility] Resident PGY-3  [phone]    I precepted today with Dr. Alejandra Black.

## 2020-04-23 NOTE — PATIENT INSTRUCTIONS
Here is the plan from today's visit    1. Depression  A new prescription was sent to the [facility] pharmacy.   - buPROPion (WELLBUTRIN) 75 MG tablet; Take 1 tablet (75 mg) by mouth 2 times daily  Dispense: 180 tablet; Refill: 0    2. Advice Given About Covid-19 Virus by Telephone  3. COVID-19  4. Pleuritic chest pain  Please try ibuprofen, naproxen or other NSAIDs to aid with your ongoing right lung pain. If this does not improve, you can make an in clinic appointment where they can get a chest x-ray and/or labs.     Please call or return to clinic if your symptoms don't go away.      Thank you for getting your care through [facility] today.

## 2020-04-28 ENCOUNTER — TELEPHONE (OUTPATIENT)
Dept: FAMILY MEDICINE | Facility: CLINIC | Age: 40
End: 2020-04-28

## 2020-04-28 NOTE — TELEPHONE ENCOUNTER
LVM to pt about video visit and if they received video visit instruction via email or [employer]. Informed in message that if pt has any questions to contact the [facility].     Rahul Thompson MA

## 2020-04-29 ENCOUNTER — VIRTUAL VISIT (OUTPATIENT)
Dept: FAMILY MEDICINE | Facility: CLINIC | Age: 40
End: 2020-04-29
Payer: COMMERCIAL

## 2020-04-29 DIAGNOSIS — J98.4 COVID-19 WITH PULMONARY COMORBIDITY: ICD-10-CM

## 2020-04-29 DIAGNOSIS — F33.41 RECURRENT MAJOR DEPRESSIVE DISORDER, IN PARTIAL REMISSION (H): ICD-10-CM

## 2020-04-29 DIAGNOSIS — R07.9 RIGHT-SIDED CHEST PAIN: Primary | ICD-10-CM

## 2020-04-29 DIAGNOSIS — U07.1 COVID-19 WITH PULMONARY COMORBIDITY: ICD-10-CM

## 2020-04-29 DIAGNOSIS — F32.1 CURRENT MODERATE EPISODE OF MAJOR DEPRESSIVE DISORDER WITHOUT PRIOR EPISODE (H): ICD-10-CM

## 2020-04-29 RX ORDER — BUPROPION HYDROCHLORIDE 75 MG/1
TABLET ORAL
Qty: 180 TABLET | Refills: 1 | Status: SHIPPED | OUTPATIENT
Start: 2020-04-29 | End: 2020-08-17

## 2020-04-29 ASSESSMENT — PATIENT HEALTH QUESTIONNAIRE - PHQ9: SUM OF ALL RESPONSES TO PHQ QUESTIONS 1-9: 9

## 2020-04-29 NOTE — PROGRESS NOTES
"Family Medicine Video Visit Note  Janet is being evaluated via a billable video visit.               Video Visit Consent     Patient was verbally read the following and verbal consent was obtained.  \"This video visit will be conducted via a call between you and your physician/provider. We have found that certain health care needs can be provided without the need for an in-person physical exam.  This service lets us provide the care you need with a video conversation.  If a prescription is necessary we can send it directly to your pharmacy.  If lab work is needed we can place an order for that and you can then stop by our lab to have the test done at a later time.    If during the course of the call the physician/provider feels a video visit is not appropriate, you will not be charged for this service.\"     (Name person giving consent:  Patient   Date verbal consent given:  4/29/2020  Time verbal consent given:  1:24 PM)    Patient would like the video invitation sent by: Send to e-mail at: [email or web address]         Chief Complaint   Patient presents with     RECHECK     Covid 19 last month and has had pain in right lung since March 10th pain is always present     Current Outpatient Medications   Medication Sig Dispense Refill     buPROPion (WELLBUTRIN) 75 MG tablet Take 2 in the am and 1 in the pm 180 tablet 1     Cholecalciferol (VITAMIN D PO) Take 5,000 Units by mouth.       fluticasone (FLONASE) 50 MCG/ACT nasal spray Spray 1 spray into both nostrils daily. 3 Package 3     Multiple Vitamin (DAILY MULTIVITAMIN PO) Take  by mouth.       Omega-3 Fatty Acids (FISH OIL PO) Take  by mouth.       valACYclovir (VALTREX) 500 MG tablet Take 1 tablet (500 mg) by mouth 3 times daily for 3 days 9 tablet 3     Allergies   Allergen Reactions     Shellfish-Derived Products Hives     Amoxicillin      Erythromycin      No Clinical Screening - See Comments Nausea and Vomiting     Eggplant     Scopolamine      Redness and " swelling of the face            HPI     Phone visit: 2:12 PM    Janet presents to clinic today for the following health issues:    Main concern:  She thinks she had presumptive COVID 19, starting 3/11 and went through two phases where bad and then better, then bad again.  Now she has no symptoms beginning of April. But has been left with a pain in her right lung since the beginning. On the bottom and in her back. While she was most sick, she was having other lung pains (heavy, SOB, and left sided pains) that is now gone.  Then did only 3 stairs before had to stop due to SOB  Pain - sometimes it feels like it us up higher.  Feels like someone stuck a needle in her lung and left it there. Always there. Fluctuates in location. Can go for a walk but stops when SOB.  Thinks she can walk up 2 flights of stairs. No lung history. Non smoker. No vaping or other inhalants.   Antibody testing: is Ok not getting right now but would like to do so in the future.       Buproprion refill:  Is having a hard time completing her job and concentrating. Home.   Unable to get her work done.  Major. Never felt this bad before.   Fatigue and very scatter brained.   Appetite  Is at times speaking slowly. Mark notes the seems stoned - at times can't think of the right word to say (couple of years)  When was on the Buproprion XL she found her joints hurting and acting weird. Same dose on the rapid acting and not happening.     Feeling achey all the time and saw rheum and did not go well - so gave up.       PHQ-2 Score:     PHQ-2 ( 1999 Pfizer) 4/29/2020 7/16/2019   Q1: Little interest or pleasure in doing things 1 (No Data)   Q2: Feeling down, depressed or hopeless 2 (No Data)   PHQ-2 Score 3 -     PHQ 4/29/2020   PHQ-9 Total Score 9   Q9: Thoughts of better off dead/self-harm past 2 weeks Not at all                Review of Systems:            Physical Exam:     There were no vitals taken for this visit.  Estimated body mass index is  "27.64 kg/m  as calculated from the following:    Height as of 7/16/19: 1.71 m (5' 7.32\").    Weight as of 7/16/19: 80.8 kg (178 lb 3.2 oz).              Assessment and Plan   Janet was seen today for recheck.    Diagnoses and all orders for this visit:    Right-sided chest pain - likely residual from presumptive COVID. Will do CXRay since been a month and we are not really sure of the longer term sequalea  -     XR CHEST 2 VW; Future    Recurrent major depressive disorder, in partial remission (H) - likely worsened due to COVID and recent illness. Will increase her Wellbutrin in am and if no side effects, to increase to 150 BID in 1 week.  See me post that.   Recommended therapy.   -     buPROPion (WELLBUTRIN) 75 MG tablet; Take 2 in the am and 1 in the pm    Current moderate episode of major depressive disorder without prior episode (H)    COVID-19 with pulmonary comorbidity - add CBC since she is more fatigued.    -     CBC with Plt ([facility])        Refilled medications that would be required in the next 3 months.     After Visit Information:  Patient chose to view AVS via [employer]  Return in about 3 weeks (around 5/20/2020).      Type of service:  Phone (video did not work)_  Phone End Time (time video stopped): 2:47 PM    Originating Location (pt. Location): Home    Distant Location (provider location):  \[facility]\"" FAMILY MEDICINE CLINIC     Mode of Communication attempted:  Video Conference via NetMovie - did not work.     Total time - 2:12 to 2;47: 35 minutes with more than half spent counseling about COVID, the outcomes, option to do xray and plan on her MDD>     ELKE RAMÍREZ          "

## 2020-04-29 NOTE — PATIENT INSTRUCTIONS
Here is the plan from today's visit    1. Right-sided chest pain  Call the  to schedule. I will get back to you by [employer] on the results. I will call you if worrisome.    As we discussed, this is most likely some scarring which we might not see but we can make sure that it is not from a collection of air or fluid outside your lung.   - XR CHEST 2 VW; Future    2. Recurrent major depressive disorder, in partial remission (H) -   Increase to 150 mg in the am and if after 1 week you have no side effects and you are not better, increase to 150 mg twice a day.   Look for online therapy  Get back with me in 2 - 3 weeks to see how you are doing.   - buPROPion (WELLBUTRIN) 75 MG tablet; Take 2 in the am and 1 in the pm  Dispense: 180 tablet; Refill: 1    3. Your chronic symptoms.   I reviewed your chart and there were no abnormal tests. I also see that the allergist did not think your symptoms were related to allergies.  So - I would not do further lab testing at this point.  Once we are back to normal, we can regroup.  If you are still having ongoing daily headaches like you were 1 - 2 years ago, then you could consider being seen at the headache clinic.    Hang in there!        Please call or return to clinic if your symptoms don't go away.    Follow up plan  3 weeks to see how you are doing.     Thank you for coming to [facility] today.  Lab Testing:  **If you had lab testing today and your results are reassuring or normal they will be mailed to you or sent through [employer] within 7 days.   **If the lab tests need quick action we will call you with the results.  The phone number we will call with results is # [phone] (home) . If this is not the best number please call our clinic and change the number.  Medication Refills:  If you need any refills please call your pharmacy and they will contact us.   If you need to  your refill at a new pharmacy, please contact the new pharmacy directly. The new  pharmacy will help you get your medications transferred faster.   Scheduling:  If you have any concerns about today's visit or wish to schedule another appointment please call our office during normal business hours [phone] (8-5:00 M-F)  If a referral was made to a [facility] and you don't get a call from central scheduling please call [phone].  If a Mammogram was ordered for you at [facility] call [phone] to schedule or change your appointment.  If you had an XRay/CT/Ultrasound/MRI ordered the number is [phone] to schedule or change your radiology appointment.   Medical Concerns:  If you have urgent medical concerns please call [phone] at any time of the day.    Leatha Robles MD

## 2020-04-30 ENCOUNTER — HOSPITAL ENCOUNTER (OUTPATIENT)
Facility: CLINIC | Age: 40
Setting detail: SPECIMEN
Discharge: HOME | End: 2020-04-30
Admitting: FAMILY MEDICINE
Payer: COMMERCIAL

## 2020-04-30 ENCOUNTER — ANCILLARY PROCEDURE (OUTPATIENT)
Dept: GENERAL RADIOLOGY | Facility: CLINIC | Age: 40
End: 2020-04-30
Attending: FAMILY MEDICINE
Payer: COMMERCIAL

## 2020-04-30 DIAGNOSIS — R07.9 RIGHT-SIDED CHEST PAIN: ICD-10-CM

## 2020-04-30 LAB
BASOPHILS # BLD AUTO: 0 10E9/L (ref 0–0.2)
BASOPHILS NFR BLD AUTO: 0.9 %
DIFFERENTIAL METHOD BLD: NORMAL
EOSINOPHIL # BLD AUTO: 0.1 10E9/L (ref 0–0.7)
EOSINOPHIL NFR BLD AUTO: 2.4 %
ERYTHROCYTE [DISTWIDTH] IN BLOOD BY AUTOMATED COUNT: 12.1 % (ref 10–15)
HCT VFR BLD AUTO: 37.2 % (ref 35–47)
HGB BLD-MCNC: 12.2 G/DL (ref 11.7–15.7)
IMM GRANULOCYTES # BLD: 0 10E9/L (ref 0–0.4)
IMM GRANULOCYTES NFR BLD: 0.4 %
LYMPHOCYTES # BLD AUTO: 1.9 10E9/L (ref 0.8–5.3)
LYMPHOCYTES NFR BLD AUTO: 41 %
MCH RBC QN AUTO: 31 PG (ref 26.5–33)
MCHC RBC AUTO-ENTMCNC: 32.8 G/DL (ref 31.5–36.5)
MCV RBC AUTO: 94 FL (ref 78–100)
MONOCYTES # BLD AUTO: 0.6 10E9/L (ref 0–1.3)
MONOCYTES NFR BLD AUTO: 12.1 %
NEUTROPHILS # BLD AUTO: 2 10E9/L (ref 1.6–8.3)
NEUTROPHILS NFR BLD AUTO: 43.2 %
NRBC # BLD AUTO: 0 10*3/UL
NRBC BLD AUTO-RTO: 0 /100
PLATELET # BLD AUTO: 243 10E9/L (ref 150–450)
RBC # BLD AUTO: 3.94 10E12/L (ref 3.8–5.2)
WBC # BLD AUTO: 4.6 10E9/L (ref 4–11)

## 2020-04-30 PROCEDURE — 36415 COLL VENOUS BLD VENIPUNCTURE: CPT | Performed by: FAMILY MEDICINE

## 2020-04-30 PROCEDURE — 85025 COMPLETE CBC W/AUTO DIFF WBC: CPT | Performed by: FAMILY MEDICINE

## 2020-09-29 DIAGNOSIS — F33.41 RECURRENT MAJOR DEPRESSIVE DISORDER, IN PARTIAL REMISSION (H): ICD-10-CM

## 2020-09-29 RX ORDER — BUPROPION HYDROCHLORIDE 75 MG/1
TABLET ORAL
Qty: 90 TABLET | Refills: 0 | Status: SHIPPED | OUTPATIENT
Start: 2020-09-29 | End: 2020-10-28

## 2020-09-29 NOTE — TELEPHONE ENCOUNTER
Verify that the refill encounter hasn't been started Yes    [facility] Family Medicine phone call message- patient requesting a refill:    Full Medication Name: buPROPion (WELLBUTRIN) 75 MG tablet    Dose:      Pharmacy confirmed as   [facility]  [address]  [facility] [address]  Phone: [phone] Fax: [phone]    [id number] - [place], [place] - [address] AT [facility] & [address] [place] [address]  [address]  [facility] [address]  Phone: [phone] Fax: [phone]    [facility] [id number] - CLOSED - [place], [place] - [address]  [address] [facility] [address]  Phone: [phone] Fax: [phone]    [facility] - [place], [place] - [address]   [address] [facility] [address]  Phone: [phone] Fax: [phone]    [id number] - [place], [place] - [address] AT [address]  [address]  [facility] [address]  Phone: [phone] Fax: [phone]  : Yes    Medication tab checked to see if medication has been sent  No:    Additional Comments: pt request one year refill if possiple    OK to leave a message on voice mail? Yes    Advised patient refill may take up to 2 business days? Yes    Primary language: English      needed? No    Call taken on September 29, 2020 at 4:27 PM by John Mckenna    Route to P [facility] MED REFILL

## 2020-09-29 NOTE — TELEPHONE ENCOUNTER

## 2020-10-28 ENCOUNTER — MYC REFILL (OUTPATIENT)
Dept: FAMILY MEDICINE | Facility: CLINIC | Age: 40
End: 2020-10-28

## 2020-10-28 DIAGNOSIS — F33.41 RECURRENT MAJOR DEPRESSIVE DISORDER, IN PARTIAL REMISSION (H): ICD-10-CM

## 2020-10-29 RX ORDER — BUPROPION HYDROCHLORIDE 75 MG/1
TABLET ORAL
Qty: 90 TABLET | Refills: 3 | Status: SHIPPED | OUTPATIENT
Start: 2020-10-29 | End: 2020-10-30

## 2020-10-30 ENCOUNTER — VIRTUAL VISIT (OUTPATIENT)
Dept: FAMILY MEDICINE | Facility: CLINIC | Age: 40
End: 2020-10-30
Payer: COMMERCIAL

## 2020-10-30 DIAGNOSIS — F33.41 RECURRENT MAJOR DEPRESSIVE DISORDER, IN PARTIAL REMISSION (H): ICD-10-CM

## 2020-10-30 DIAGNOSIS — Z20.822 SUSPECTED COVID-19 VIRUS INFECTION: Primary | ICD-10-CM

## 2020-10-30 PROCEDURE — 99214 OFFICE O/P EST MOD 30 MIN: CPT | Mod: 95 | Performed by: FAMILY MEDICINE

## 2020-10-30 RX ORDER — BUPROPION HYDROCHLORIDE 75 MG/1
TABLET ORAL
Qty: 90 TABLET | Refills: 3 | Status: SHIPPED | OUTPATIENT
Start: 2020-10-30 | End: 2020-11-04

## 2020-10-30 ASSESSMENT — PATIENT HEALTH QUESTIONNAIRE - PHQ9: SUM OF ALL RESPONSES TO PHQ QUESTIONS 1-9: 11

## 2020-10-30 NOTE — PATIENT INSTRUCTIONS
Here is the plan from today's visit    1. Suspected COVID-19 virus infection - you can get this tested when you come for your annual exam.  We can do the blood test at [facility].  Or, you can go to a [facility] clinic and call there and ask to have the test done there.   - COVID-19 Virus (Coronavirus) Antibody & Titer Reflex; Future    2. Recurrent major depressive disorder, in partial remission (H)  Increase the Wellbutrin to 150 in the afternoon as well. This might help with energy.  When I see you back we will decide whether it makes sense to also add an selective serotonin reuptake inhibitor.          Follow up plan  Return in about 3 weeks (around 11/20/2020) for Routine preventive.  Thank you for coming to [facility] today.  Lab Testing:  **If you had lab testing today and your results are reassuring or normal they will be mailed to you or sent through [employer] within 7 days.   **If the lab tests need quick action we will call you with the results.  The phone number we will call with results is # [phone] (home) . If this is not the best number please call our clinic and change the number.  Medication Refills:  If you need any refills please call your pharmacy and they will contact us.   If you need to  your refill at a new pharmacy, please contact the new pharmacy directly. The new pharmacy will help you get your medications transferred faster.   Scheduling:  If you have any concerns about today's visit or wish to schedule another appointment please call our office during normal business hours [phone] (8-5:00 M-F)  If a referral was made to a [facility] and you don't get a call from central scheduling please call [phone].  If a Mammogram was ordered for you at [facility] call [phone] to schedule or change your appointment.  If you had an XRay/CT/Ultrasound/MRI ordered the number is [phone] to schedule or change your radiology appointment.    Medical Concerns:  If you have urgent medical concerns please call [phone] at any time of the day.    Leatha Robles MD

## 2020-10-30 NOTE — PROGRESS NOTES
"Family Medicine Video Visit Note  Janet is being evaluated via a billable video visit.               Video Visit Consent     Patient was verbally read the following and verbal consent was obtained.  \"Video visits are billed at different rates depending on your insurance coverage. During this emergency period, for some insurers they may be billed the same as an in-person visit.  Please reach out to your insurance provider with any questions.  If during the course of the call the physician/provider feels a video visit is not appropriate, you will not be charged for this service.\"     (Name person giving consent:  Patient   Date verbal consent given:  10/30/2020  Time verbal consent given:  3:09 PM)    Patient would like the video invitation sent by: Send to e-mail at: [email or web address]        Chief Complaint   Patient presents with     Depression     Having trouble getting things done      Fatigue     likely had COVID and not fully better                HPI     Video Start Time: 3:09 PM    Janet presents to clinic today for the following health issues:    Mental Health:  PHQ 4/29/2020 10/30/2020   PHQ-9 Total Score 9 11   Q9: Thoughts of better off dead/self-harm past 2 weeks Not at all Not at all   has been very isolated.  Is very worried about COVID and so is not getting out.  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  Finds it hard to complete projects.   Most bothered by not getting anything done.   Has some people she talks to but not really anyone she sees. Lives with her . Things going well with him. They are both home all the time.   Her  has not commented on her behavior.  She at times can't think of words.  She pauses and her  will finish her sentence.   Hard time going sleep. But then wants to keep sleeping.    Never been suicidal  Does feel like she is more depressed than in the past and has had depression before.   Never worked up for ADD.   Meds: only been on Wellbutrin.  When was on the " long acting ones, she found that her tendons were weird and gross.  It went away when switched to the short acting one.       Presumptive COVID - March 12. Was super sick and called a couple of times to the ED. Never was seen.  Sick for 5 weeks. Never was tested at the time.    Classic symptoms: could not breath like her lungs were heavy and that her right lung was worse than the other side. Did go in for an xray - 4/30 and was read as normal other than a bruised rib.    Still when takes a big breath still hurts on her right side. Felt like a belt wrapped around her chest. Could not do much and would feel like she was going to pass out walking up the steps to her house.   Did not notice smell changes. Did not eat a lot - limited   Extreme exhaustion and body aches that happened all over. Started to get better but then got sick again.   Temperature - never got high temperature. Her temp went low and bounced around. 96 - 100.   Never had a pulse ox at the time.    A bit of diarrhea in the beginning and then tapered away after the first two weeks.   Also had very dry throat but felt like it went all the way down her esophageus. Also felt like she had heart burn in her stomach.   No rash  Had really bad brain fog  Feels like she is 92% back.  Her breathing is what keeps her from 100% because can't take a full deep breath. And then will have intermittent days with body aches all over and tired. The last one she had was 5 weeks ago and lasted for 2 weeks.    also sick then but much less severe.         Current Outpatient Medications   Medication Sig Dispense Refill     buPROPion (WELLBUTRIN) 75 MG tablet Take 2 in the am and 2 in the pm 90 tablet 3     Cholecalciferol (VITAMIN D PO) Take 5,000 Units by mouth.       Multiple Vitamin (DAILY MULTIVITAMIN PO) Take  by mouth.       fluticasone (FLONASE) 50 MCG/ACT nasal spray Spray 1 spray into both nostrils daily. 3 Package 3     Omega-3 Fatty Acids (FISH OIL PO) Take   "by mouth.       valACYclovir (VALTREX) 500 MG tablet Take 1 tablet (500 mg) by mouth 3 times daily for 3 days 9 tablet 3     Allergies   Allergen Reactions     Shellfish-Derived Products Hives     Amoxicillin      Erythromycin      No Clinical Screening - See Comments Nausea and Vomiting     Eggplant     Scopolamine      Redness and swelling of the face              Review of Systems:              Physical Exam:     There were no vitals taken for this visit.  Estimated body mass index is 27.64 kg/m  as calculated from the following:    Height as of 7/16/19: 1.71 m (5' 7.32\").    Weight as of 7/16/19: 80.8 kg (178 lb 3.2 oz).    GENERAL: Healthy, alert and no distress  RESP:  No visible retractions or increased work of breathing.  Full sentences  PSYCH: Affect flat, speech slightly slowed.             Assessment and Plan   Janet was seen today for depression and fatigue.    Diagnoses and all orders for this visit:    Suspected COVID-19 virus infection- reviewed that ideally we should check antibodies at this time.Very consistent with COVID. Positive testing would confirm.  Negative testing would be less likely to rule this out.   Reviewed that [facility] has a rehab program. Due to her poor insurance (10,000$ deductible) she rather wait on exploring this but interested.   -     Cancel: PHYSIATRY REFERRAL ([facility])  -     COVID-19 Virus (Coronavirus) Antibody & Titer Reflex; Future    Recurrent major depressive disorder, in partial remission (H) - seems worse. Likely multifactorial with prolonged illness and isolation.  Could have ADD as well but should try to treat MDD first. Will increase the Wellbutrin to 150 mg in the am and again in the afternoon. Check back with me in 2 - 3 weeks to see how doing and then add selective serotonin reuptake inhibitor if need be.  Planning to come in for CPE.    -     buPROPion (WELLBUTRIN) 75 MG tablet; Take 2 in the am and 2 in the pm        Refilled medications that would be required " in the next 3 months.     After Visit Information:  Patient chose to view AVS via [email or web address]      Return in about 3 months (around 1/30/2021).      Video-Visit Details    Type of service:  Video Visit    Video End Time (time video stopped): 3:58 PM    Originating Location (pt. Location): Home    Distant Location (provider location):  [facility]     Platform used for Video Visit: Shirley Robles MD

## 2020-11-04 ENCOUNTER — MYC REFILL (OUTPATIENT)
Dept: FAMILY MEDICINE | Facility: CLINIC | Age: 40
End: 2020-11-04

## 2020-11-04 DIAGNOSIS — F33.41 RECURRENT MAJOR DEPRESSIVE DISORDER, IN PARTIAL REMISSION (H): ICD-10-CM

## 2020-11-04 RX ORDER — BUPROPION HYDROCHLORIDE 75 MG/1
TABLET ORAL
Qty: 360 TABLET | Refills: 3 | Status: SHIPPED | OUTPATIENT
Start: 2020-11-04 | End: 2020-11-23

## 2020-11-10 ENCOUNTER — TELEPHONE (OUTPATIENT)
Dept: FAMILY MEDICINE | Facility: CLINIC | Age: 40
End: 2020-11-10

## 2020-11-10 NOTE — TELEPHONE ENCOUNTER
Prior Authorization Retail Medication Request    Medication/Dose: buPROPion (WELLBUTRIN) 75 MG tablet  ICD code (if different than what is on RX):  Recurrent major depressive disorder, in partial remission (H) [F33.41]   Previously Tried and Failed:  See chart  Rationale:  See Chart    Insurance Name:  [employer]  Insurance ID:  [id number]       Pharmacy Information (if different than what is on RX)  Name:  [facility]  Phone:  [phone]

## 2020-11-11 NOTE — TELEPHONE ENCOUNTER
PA Initiation    Medication: buPROPion (WELLBUTRIN) 75 MG tablet  Insurance Company: Other (see comments)Comment:  [place] [facility]/ [employer] [place]  Pharmacy Filling the Rx: [facility] #[id number] - [place], [place] - [address] AT [facility] [place] [facility]  Filling Pharmacy Phone: [phone]  Filling Pharmacy Fax: [phone]  Start Date: 11/11/2020

## 2020-11-12 NOTE — TELEPHONE ENCOUNTER
Prior Authorization Approval    Authorization Effective Date: 11/11/2020  Authorization Expiration Date: 11/11/2021  Medication: buPROPion (WELLBUTRIN) 75 MG tablet  Approved Dose/Quantity:   Reference #: [id number]   Insurance Company: Other (see comments)Comment:  [place] [facility]/ [employer] [place]  Expected CoPay:       CoPay Card Available:      Foundation Assistance Needed:    Which Pharmacy is filling the prescription (Not needed for infusion/clinic administered): [id number] [address] AT [facility] [place] & [facility]  Pharmacy Notified: Yes  Patient Notified: Yes, **Instructed pharmacy to notify patient when script is ready to /ship.**

## 2020-11-29 ENCOUNTER — HEALTH MAINTENANCE LETTER (OUTPATIENT)
Age: 40
End: 2020-11-29

## 2021-03-01 DIAGNOSIS — F33.41 RECURRENT MAJOR DEPRESSIVE DISORDER, IN PARTIAL REMISSION (H): ICD-10-CM

## 2021-03-01 NOTE — TELEPHONE ENCOUNTER

## 2021-03-02 RX ORDER — BUPROPION HYDROCHLORIDE 75 MG/1
TABLET ORAL
Qty: 360 TABLET | Refills: 3 | Status: SHIPPED | OUTPATIENT
Start: 2021-03-02 | End: 2021-11-17

## 2021-05-29 ENCOUNTER — RECORDS - HEALTHEAST (OUTPATIENT)
Dept: ADMINISTRATIVE | Facility: CLINIC | Age: 41
End: 2021-05-29

## 2021-07-28 DIAGNOSIS — Z80.41 FAMILY HISTORY OF MALIGNANT NEOPLASM OF OVARY: Primary | ICD-10-CM

## 2021-07-29 ENCOUNTER — TRANSCRIBE ORDERS (OUTPATIENT)
Dept: OTHER | Age: 41
End: 2021-07-29

## 2021-07-29 DIAGNOSIS — N63.0 LUMP OR MASS IN BREAST: Primary | ICD-10-CM

## 2021-08-19 ENCOUNTER — TELEPHONE (OUTPATIENT)
Dept: FAMILY MEDICINE | Facility: CLINIC | Age: 41
End: 2021-08-19

## 2021-08-19 NOTE — TELEPHONE ENCOUNTER
RN extended appt to 40 min and left detailed message for patient informing her of this.   Andra Ventura, RN

## 2021-08-25 ENCOUNTER — OFFICE VISIT (OUTPATIENT)
Dept: SURGERY | Facility: CLINIC | Age: 41
End: 2021-08-25
Attending: PHYSICIAN ASSISTANT
Payer: COMMERCIAL

## 2021-08-25 ENCOUNTER — ANCILLARY PROCEDURE (OUTPATIENT)
Dept: MAMMOGRAPHY | Facility: CLINIC | Age: 41
End: 2021-08-25
Attending: PHYSICIAN ASSISTANT
Payer: COMMERCIAL

## 2021-08-25 VITALS
DIASTOLIC BLOOD PRESSURE: 68 MMHG | HEART RATE: 77 BPM | OXYGEN SATURATION: 98 % | SYSTOLIC BLOOD PRESSURE: 103 MMHG | WEIGHT: 192.9 LBS | BODY MASS INDEX: 31 KG/M2 | RESPIRATION RATE: 16 BRPM | TEMPERATURE: 98.4 F | HEIGHT: 66 IN

## 2021-08-25 DIAGNOSIS — N63.0 LUMP OR MASS IN BREAST: ICD-10-CM

## 2021-08-25 DIAGNOSIS — N63.0 BREAST MASS: ICD-10-CM

## 2021-08-25 PROCEDURE — G0279 TOMOSYNTHESIS, MAMMO: HCPCS | Performed by: RADIOLOGY

## 2021-08-25 PROCEDURE — 99214 OFFICE O/P EST MOD 30 MIN: CPT | Performed by: PHYSICIAN ASSISTANT

## 2021-08-25 PROCEDURE — G0463 HOSPITAL OUTPT CLINIC VISIT: HCPCS

## 2021-08-25 PROCEDURE — 77066 DX MAMMO INCL CAD BI: CPT | Performed by: RADIOLOGY

## 2021-08-25 PROCEDURE — 76642 ULTRASOUND BREAST LIMITED: CPT | Mod: LT | Performed by: RADIOLOGY

## 2021-08-25 ASSESSMENT — MIFFLIN-ST. JEOR: SCORE: 1566.74

## 2021-08-25 ASSESSMENT — PAIN SCALES - GENERAL: PAINLEVEL: NO PAIN (0)

## 2021-08-25 NOTE — NURSING NOTE
"Oncology Rooming Note    August 25, 2021 10:17 AM   Janet Ryder is a 41 year old female who presents for:    Chief Complaint   Patient presents with     Oncology Clinic Visit     LUMP OR MASS IN LEFT BREAST      Initial Vitals: /68   Pulse 77   Temp 98.4  F (36.9  C) (Oral)   Resp 16   Ht 1.676 m (5' 6\")   Wt 87.5 kg (192 lb 14.4 oz)   SpO2 98%   BMI 31.13 kg/m   Estimated body mass index is 31.13 kg/m  as calculated from the following:    Height as of this encounter: 1.676 m (5' 6\").    Weight as of this encounter: 87.5 kg (192 lb 14.4 oz). Body surface area is 2.02 meters squared.  No Pain (0) Comment: Data Unavailable   No LMP recorded.  Allergies reviewed: Yes  Medications reviewed: Yes    Medications: Medication refills not needed today.  Pharmacy name entered into [facility]:    [facility] #[id number] - [place], [place] - [address] AT [facility] [address] [facility] [address]  [id number] - [place], [place] - [address] [facility] - [place], [place] - [address] [facility] #[id number] - [place], [place] - [address] AT [facility] & [facility]    Clinical concerns: New pt      Mikki Joseph, KELSEA August 25, 2021  10:17 AM             "

## 2021-08-25 NOTE — PATIENT INSTRUCTIONS
Janet Ryder is a 41 year old woman with left breast mass, imaging today consistent with benign appearing cyst. She meets NCCN guidelines for high risk screening based on her family history of breast and ovarian cancer.     1) Left breast cyst, no concerning findings on imaging.   - Follow up with any worsening symptoms or increase in size.     2) Family history of breast cancer  Discussed she meets NCCN guidelines for high risk screening based on family history with lifetime risk for breast cancer of >20%. Screening recommendations based on NCCN guidelines. Clinical encounter every 6-12 months starting now (but not prior to age 21). Annual mammogram with jason starting 10 years prior to the youngest family member but not less than age 30 or age 40 ( whichever comes first). Annual breast MRI to begin 10 years prior to the youngest family member diagnosed but not less than 25 years old or age 40 ( whichever comes first).  Counseling was provided with available strategies including lifestyle modifications and risk reducing intervention.   - Follow up in 1 year for screening mammogram with clinic visit     3) Lifestyle Modifications were provided.   - Maintain a healthy weight (BMI 20-25). Higher body mass index (BMI) and adult weight gain is associated with increased risk for breast cancer. This increase in risk has been attributed to increase in circulating endogenous estrogen levels from fat tissue.   - Alcohol consumption, even at moderate levels (1-2 drinks per day), increases breast cancer risk and are best avoided. If you choose to drink alcohol limit alcohol consumption to less than 1 drink per day. (1 ounce of liquor, 6 ounces of wine, or 8 ounces of beer)  - Be active daily and void being sedentary. Take part in at least 150-300 minutes of moderate-intensity physical activity per week.

## 2021-08-25 NOTE — LETTER
8/25/2021         RE: Janet Ryder  [address]  [facility] [address]        Dear Colleague,    Thank you for referring your patient, Janet Ryder, to the [facility]. Please see a copy of my visit note below.    NEW CONSULTATION   Aug 25, 2021     Janet Ryder is a 41 year old woman who presents with a breast lump.     HPI:    Family history of mother who had ovarian cancer. Paternal aunt with history of breast cancer. She met with Genetic Counseling in 2018 but never ended up going through with the testing. She does have an appointment with Genetic Counseling coming up.     She presents today after she developed a left breast mass 1 month ago. The mass was large and visible. The mass was very painful. She denies any erythema but the image she showed me does demonstrate erythema. She denies fever. The mass is now much small and there is now pain. She denies this being associated with her period. She denies any change in her periods or change in medications. She denies any nipple inversion or nipple discharge. She had had a left breast cyst aspirated on 2018 and right breast cyst aspirated in 2014.     BREAST-SPECIFIC HISTORY:    Previous breast imaging: Yes   - 12/12/18 b/l Dmammo and left breast ultrasound for pain: benign appearing cyst 10:00 BI-RADS 2, aspiration     Prior breast biopsies/surgeries: No  - right (2014) and left (2018) breast cyst aspiration    Prior history of breast cancer or DCIS: No  Prior radiation history: No   Self breast exams: No  Breast density:     GYN HISTORY:  G0  Age at menarche: 14  Menopausal: premenopausal   Menopausal hormone replacement therapy: no    RISK ASSESSMENT: < 3% 5 year risk and > 20% lifetime risk   Patti:0.6% 5 year risk   KERLINE/Tyrer-Cuzick: 23.3% lifetime risk  Spencer: 9.4% lifetime risk    FAMILY HISTORY:  Breast ca: Yes  - paternal aunt, 60's  Ovarian ca: Yes   - mother, 67  Pancreatic ca: No  Prostate: No  Gastric  "ca: No  Melanoma: Yes   - maternal grandfather  Colon ca: Yes   - maternal grandfather  Other cancer: No  Other genetic, testing, syndromes, or clotting disorders: no     PAST MEDICAL HISTORY  Past Medical History:   Diagnosis Date     Atypical migraine      PAST SURGICAL HISTORY   Past Surgical History:   Procedure Laterality Date     ORTHOPEDIC SURGERY       RUST NONSPECIFIC PROCEDURE  AGE 6, AGE 10    REMOVAL OF FACIAL SKIN CYSTS     RUST NONSPECIFIC PROCEDURE  AGE 22    SUTURES FOR FACIAL LACERATION     MEDICATIONS  Current Outpatient Medications   Medication Sig Dispense Refill     buPROPion (WELLBUTRIN) 75 MG tablet Take 2 in the am and 2 in the pm 360 tablet 3     Cholecalciferol (VITAMIN D PO) Take 5,000 Units by mouth.       Multiple Vitamin (DAILY MULTIVITAMIN PO) Take  by mouth.       fluticasone (FLONASE) 50 MCG/ACT nasal spray Spray 1 spray into both nostrils daily. 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     ROS:  Change in vision No  Headaches: no  Respiratory: No shortness of breath, dyspnea on exertion, cough, or hemoptysis   Cardiovascular: negative   Gastrointestinal: negative Abdominal pain: no  Breast: left breast mass  Musculoskeletal: negative Joint pain No Back pain: no  Psychiatric: negative  Hematologic/Lymphatic/Immunologic: negative  Endocrine: negative    EXAM  /68   Pulse 77   Temp 98.4  F (36.9  C) (Oral)   Resp 16   Ht 1.676 m (5' 6\")   Wt 87.5 kg (192 lb 14.4 oz)   SpO2 98%   BMI 31.13 kg/m     PHYSICAL EXAM  Respiratory: breathing non labored.   Breasts: Examination was done in both the upright and " supine positions.  Breasts are symmetrical . No skin or nipple changes. No nipple discharge. Right breast with nodular dense breast tissue in upper outer breast, no mass. Left breast mass at 12:00-2:00 measuring 2 cm. Mass is smooth and mobile.   No clavicular, cervical, or axillary lymphadenopathy.     INVESTIGATIONS:    8/25/21 bilateral diagnostic mammogram and left breast ultrasound: benign appearing cyst per Radiology, final report pending.     ASSESSMENT/PLAN:    Janet Ryder is a 41 year old woman with left breast mass, imaging today consistent with benign appearing cyst. She meets NCCN guidelines for high risk screening based on her family history of breast and ovarian cancer.     1) Left breast cyst, no concerning findings on imaging.   - Follow up with any worsening symptoms or increase in size.     2) Family history of breast cancer  Discussed she meets NCCN guidelines for high risk screening based on family history with lifetime risk for breast cancer of >20%. Screening recommendations based on NCCN guidelines. Clinical encounter every 6-12 months starting now (but not prior to age 21). Annual mammogram with jason starting 10 years prior to the youngest family member but not less than age 30 or age 40 ( whichever comes first). Annual breast MRI to begin 10 years prior to the youngest family member diagnosed but not less than 25 years old or age 40 ( whichever comes first).  Counseling was provided with available strategies including lifestyle modifications and risk reducing intervention.   - Follow up in 1 year for screening mammogram with clinic visit     3) Lifestyle Modifications were provided.   - Maintain a healthy weight (BMI 20-25). Higher body mass index (BMI) and adult weight gain is associated with increased risk for breast cancer. This increase in risk has been attributed to increase in circulating endogenous estrogen levels from fat tissue.   - Alcohol consumption, even at moderate levels  (1-2 drinks per day), increases breast cancer risk and are best avoided. If you choose to drink alcohol limit alcohol consumption to less than 1 drink per day. (1 ounce of liquor, 6 ounces of wine, or 8 ounces of beer)  - Be active daily and void being sedentary. Take part in at least 150-300 minutes of moderate-intensity physical activity per week.     Candace Santos PA-C    30 minutes spent on the date of the encounter doing chart review, review of test results, interpretation of tests, patient visit and documentation.         Again, thank you for allowing me to participate in the care of your patient.        Sincerely,        Candace Santos PA-C

## 2021-09-14 ENCOUNTER — VIRTUAL VISIT (OUTPATIENT)
Dept: ONCOLOGY | Facility: CLINIC | Age: 41
End: 2021-09-14
Attending: FAMILY MEDICINE
Payer: COMMERCIAL

## 2021-09-14 DIAGNOSIS — Z80.3 FAMILY HISTORY OF MALIGNANT NEOPLASM OF BREAST: ICD-10-CM

## 2021-09-14 DIAGNOSIS — Z80.41 FAMILY HISTORY OF MALIGNANT NEOPLASM OF OVARY: Primary | ICD-10-CM

## 2021-09-14 PROCEDURE — 96040 HC GENETIC COUNSELING, EACH 30 MINUTES: CPT | Mod: 95 | Performed by: GENETIC COUNSELOR, MS

## 2021-09-14 NOTE — LETTER
Cancer Risk Management  Program Locations    [facility]  [facility]  [facility]  [facility]  [facility] [place] [facility]  Mailing Address  Cancer Risk Management Program  [facility]  [address]  [place] [address]    New patient appointments  [phone]  September 17, 2021    Immanuel Ryder  [address]  [facility] [address]      Dear Immanuel,    It was a pleasure speaking with you over video on 9-. Here is a copy of the progress note from our discussion. If you have any additional questions, please feel free to call.    Cancer Risk Management Program Genetic Counseling Notes    9/14/2021    Referring Provider:  Dr. Leatha Robles    Presenting Information:   I had a video visit with Immanuel Aleksey today for genetic counseling through the Cancer Risk Management Program, in order to discuss her family history of breast, ovarian, and other cancer.  She presents today to review this history, cancer screening recommendations, and available genetic testing options.  This consultation took place via a video visit due to current COVID-19 restrictions.    Personal History:  Immanuel is a 41 year old female. She does not have any personal history of cancer.  Last month, Immanuel presented for evaluation because of a left breast lump.  Subsequent imaging of that area was consistent with a breast cyst, and so no breast biopsy was recommended.  Immanuel does report that she has previously had another breast cyst aspirated in the past.  In addition. Immanuel states that she has had previous cysts removed from other parts of her body, including one from her back and 2 from above her eyebrow; she was not sure about what type of cysts those were thought to be.      As part of Immanuel's assessment for her most recent breast lump, Immanuel's provider had again brought  "up the possibility of genetic testing to provide more information to Immanuel about her potential cancer risks; Immanuel had previously had genetic counseling a few years ago, but she did not proceed with genetic testing at that time.  In other medical history, Immanuel reports that she takes medication for depression.  Other than this, she reports no significant health events or chronic medical issues.      With respect to her other cancer screening, Immanuel reports that she had a colonoscopy in her 20s because of some \"stomach issues.\"  She states that no colon polyps or other obvious abnormalities were noted in her colon.  She reports that a possible diagnosis of irritable bowel syndrome was discussed but that she has not had any formal treatment for that condition.  Immanuel does report having a previous atypical mole removed and also having a couple of areas \"frozen off\" her face; she states that she is due for dermatology follow-up.    Immanuel reports no personal history of smoking and no personal history of chronic, excessive alcohol use.  She does report that her mother smoked cigarettes, and so Immanuel had secondhand exposure to cigarette smoke as a child. Immanuel states that she is not aware of any other personal history of any specific, potentially concerning environmental exposures with respect to cancer risk.    Family History: Today, we updated the family history taken during her genetic counseling appointment a few years ago. (Please see scanned pedigree for detailed family history information)    Immanuel reports that her mother was diagnosed with ovarian cancer at the age of 67 and, unfortunately,  of complications of cancer within that same year.  Immanuel states that her mother was found to have cancer in her lung too, but it is not clear to Immanuel if this was a primary lung cancer or a metastasis from the ovarian cancer.    Immanuel states that her maternal grandfather was diagnosed " "with colon cancer in his early 60s and melanoma in his early 70s.  She thought he may have had a history of other skin cancers, as well.  She states that this grandfather  because of causes unrelated to cancer.    Immanuel reports that her mother has a maternal first-cousin who was diagnosed with breast cancer around the age of 45.    Immanuel states that her mother also had a maternal aunt that had a hysterectomy in her early 40s because of a \"growth the size of a softball\"; however, it is not known whether this was a cancerous or benign growth.    Immanuel reports that her mother's maternal grandmother  of some type of cancer at an older age, but the type of cancer is not known to the family    Immanuel states that she has a paternal aunt who was diagnosed with breast cancer in her late 60s.  She states that she thinks that this paternal aunt may have had some cancer-related genetic testing, but she does not know the details of this testing.    Immanuel reports that her paternal grandfather  of complications of lung cancer in his late 50s.    Immanuel does not know a lot of details about her ancestry, but she thinks that she is generally of  ancestry. There is no reported consanguinity.    Discussion:    We briefly reviewed the features of sporadic, familial, and hereditary cancers. In looking at Immanuel's family history, it is possible that a cancer susceptibility gene is present due to Immanuel's mother's history of ovarian cancer.  We talked about that it has been estimated that as many as 1 in 4 ovarian cancers is related to an identifiable mutation in a known hereditary cancer gene.  Because of this, it has been recommended that all individuals with ovarian cancer be offered hereditary cancer genetic testing; however, Immanuel's mother did not complete any cancer-related genetic testing, and so it is appropriate for Immanuel to consider that testing for herself.  We also talked about that " there are hereditary cancer syndromes that can link melanoma, colon cancer, and breast cancer to ovarian cancer in some families, and so it is possible that some of the other cancer family history could be related to Immanuel's mother's history of ovarian cancer.      We discussed the natural history and genetics of hereditary cancer syndromes associated with ovarian cancer. A detailed handout regarding some of these hereditary cancer syndromes and the information we discussed was provided to Immanuel at the end of our appointment today and can be found in the after visit summary. Topics included: inheritance pattern, cancer risks, cancer screening recommendations, and also risks, benefits and limitations of testing.      Based on her family history, Immanuel meets current National Comprehensive Cancer Network (NCCN) criteria for genetic testing of high-penetrance breast and/or ovarian cancer susceptibility genes (including BRCA1, BRCA2, CDH1, PALB2, PTEN, and TP53), because of her mother's personal history of ovarian cancer (and potentially supported by her mother's first cousin's history of early onset breast cancer).      We discussed that there are additional genes besides those listed above that could cause increased risk for ovarian (or other) cancer. As many of these genes present with overlapping features in a family and accurate cancer risk cannot always be established based upon the pedigree analysis alone, it would be reasonable for Immanuel to consider panel genetic testing to analyze multiple genes at once.      Immanuel stated that she was interested in pursuing genetic testing through a panel of genes associated with hereditary cancer syndromes, and so we reviewed the various panel options and their potential benefits and limitations.  After this conversation, Immanuel decided that she was interested in the Common Hereditary Cancers genetic testing panel through [facility].      The Common Hereditary  Cancers genetic testing panel through [facility] includes analysis for 47 genes associated with cancers of the breast, ovary, uterus, prostate and gastrointestinal system: APC, MARK, AXIN2, BARD1, BMPR1A, BRCA1, BRCA2, BRIP1, CDH1, CDK4, CDKN2A, CHEK2, CTNNA1, DICER1, EPCAM, GREM1, HOXB13, KIT, MEN1, MLH1, MSH2, MSH3, MSH6, MUTYH, NBN, NF1, NTHL1, PALB2, PDGFRA, PMS2, POLD1, POLE, PTEN,RAD50, RAD51C, RAD51D, SDHA, SDHB, SDHC, SDHD, SMAD4, SMARCA4, STK11, TP53,TSC1, TSC2, and VHL.        We discussed that many of these genes are associated with specific hereditary cancer syndromes and published management guidelines: Hereditary Breast and Ovarian Cancer syndrome (BRCA1, BRCA2), Field syndrome (MLH1, MSH2, MSH6, PMS2, EPCAM), Familial Adenomatous Polyposis (APC), Hereditary Diffuse Gastric Cancer (CDH1), Familial Atypical Multiple Mole Melanoma syndrome (CDK4, CDKN2A), Multiple Endocrine Neoplasia type 1 (MEN1), Juvenile Polyposis syndrome (BMPR1A, SMAD4), Cowden syndrome (PTEN), Li Fraumeni syndrome (TP53), Hereditary Paraganglioma and Pheochromocytoma syndrome (SDHA, SDHB, SDHC, SDHD), Peutz-Jeghers syndrome (STK11), MUTYH Associated Polyposis (MUTYH), Tuberous sclerosis complex (TSC1, TSC2), Von Hippel-Lindau disease (VHL), and Neurofibromatosis type 1 (NF1). The MARK, AXIN2, BRIP1, CHEK2, GREM1, MSH3, NBN, NTHL1, PALB2, POLD1, POLE, RAD51C, and RAD51D genes are associated with increased cancer risk and have published management guidelines for certain cancers. The remaining genes (BARD1, CTNNA1, DICER1, HOXB13, KIT, PDGFRA, RAD50, and SMARCA4) are associated with increased cancer risk and may allow us to make medical recommendations when mutations are identified.       Medical Management: For Immanuel, we reviewed that the information from genetic testing may determine:    additional cancer screening for which Immanuel may qualify (e.g. mammogram and breast MRI, more frequent colonoscopies, more frequent dermatologic  "exams, etc.),    options for risk-reducing surgeries Immanuel could consider (e.g. bilateral mastectomy, surgery to remove her ovaries and/or uterus, etc.),      and surgery or targeted therapies for Immanuel if she were to develop certain cancers in the future (e.g.lumpectomy versus mastectomy, immunotherapy for individuals with Field syndrome, PARP inhibitors for HBOC syndrome, etc.).       These recommendations and possible targeted therapies will be discussed in detail once genetic testing is completed.     Plan:  1) Today, Immanuel decided to proceed with the Common Hereditary Cancers genetic testing panel genetic testing panel through [facility].  Therefore, consent was reviewed and verbally obtained for this testing.    2) We reviewed the options for sample collection: a blood sample or a saliva kit. Immanuel states that she prefers to have her blood drawn for genetic testing, and she plans to do this at the time of her other laboratory testing being requested for her upcoming physical with her primary care provider . Test results are expected to be available within 4-6 weeks of that blood draw.  3) We talked about the process for \"benefits investigation\" through the [facility] medical genetic testing laboratory.  Immanuel was instructed to watch for email communications from [facility] regarding billing.  4) Immanuel will either have a telephone visit or video to discuss the results.  Additional recommendations about screening will be made at that time.    Kiara Ambrose MS, [facility]  Genetic Counselor  Ph: [phone]    Face to face time via video: 36 minutes      "

## 2021-09-14 NOTE — PROGRESS NOTES
"Cancer Risk Management Program Genetic Counseling Notes    9/14/2021    Referring Provider:  Dr. Leatha Robles    Presenting Information:   I had a video visit with Immanuel Ryder today for genetic counseling through the Cancer Risk Management Program, in order to discuss her family history of breast, ovarian, and other cancer.  She presents today to review this history, cancer screening recommendations, and available genetic testing options.  This consultation took place via a video visit due to current COVID-19 restrictions (see attestation below).    Personal History:  Immanuel is a 41 year old female. She does not have any personal history of cancer.  Last month, Immanuel presented for evaluation because of a left breast lump.  Subsequent imaging of that area was consistent with a breast cyst, and so no breast biopsy was recommended.  Immanuel does report that she has previously had another breast cyst aspirated in the past.  In addition. Immanuel states that she has had previous cysts removed from other parts of her body, including one from her back and 2 from above her eyebrow; she was not sure about what type of cysts those were thought to be.      As part of Immanuel's assessment for her most recent breast lump, Immanuel's provider had again brought up the possibility of genetic testing to provide more information to Immanuel about her potential cancer risks; Immanuel had previously had genetic counseling a few years ago, but she did not proceed with genetic testing at that time.  In other medical history, Immanuel reports that she takes medication for depression.  Other than this, she reports no significant health events or chronic medical issues.      With respect to her other cancer screening, Immanuel reports that she had a colonoscopy in her 20s because of some \"stomach issues.\"  She states that no colon polyps or other obvious abnormalities were noted in her colon.  She reports that a possible " "diagnosis of irritable bowel syndrome was discussed but that she has not had any formal treatment for that condition.  Immanuel does report having a previous atypical mole removed and also having a couple of areas \"frozen off\" her face; she states that she is due for dermatology follow-up.    Immanuel reports no personal history of smoking and no personal history of chronic, excessive alcohol use.  She does report that her mother smoked cigarettes, and so Immanuel had secondhand exposure to cigarette smoke as a child. Immanuel states that she is not aware of any other personal history of any specific, potentially concerning environmental exposures with respect to cancer risk.    Family History: Today, we updated the family history taken during her genetic counseling appointment a few years ago. (Please see scanned pedigree for detailed family history information)    Immanuel reports that her mother was diagnosed with ovarian cancer at the age of 67 and, unfortunately,  of complications of cancer within that same year.  Immanuel states that her mother was found to have cancer in her lung too, but it is not clear to Immanuel if this was a primary lung cancer or a metastasis from the ovarian cancer.    Immanuel states that her maternal grandfather was diagnosed with colon cancer in his early 60s and melanoma in his early 70s.  She thought he may have had a history of other skin cancers, as well.  She states that this grandfather  because of causes unrelated to cancer.    Immanuel reports that her mother has a maternal first-cousin who was diagnosed with breast cancer around the age of 45.    Immanuel states that her mother also had a maternal aunt that had a hysterectomy in her early 40s because of a \"growth the size of a softball\"; however, it is not known whether this was a cancerous or benign growth.    Immanuel reports that her mother's maternal grandmother  of some type of cancer at an older age, but " the type of cancer is not known to the family    Immanuel states that she has a paternal aunt who was diagnosed with breast cancer in her late 60s.  She states that she thinks that this paternal aunt may have had some cancer-related genetic testing, but she does not know the details of this testing.    Immanuel reports that her paternal grandfather  of complications of lung cancer in his late 50s.    Immanuel does not know a lot of details about her ancestry, but she thinks that she is generally of  ancestry. There is no reported consanguinity.    Discussion:    We briefly reviewed the features of sporadic, familial, and hereditary cancers. In looking at Immanuel's family history, it is possible that a cancer susceptibility gene is present due to Immanuel's mother's history of ovarian cancer.  We talked about that it has been estimated that as many as 1 in 4 ovarian cancers is related to an identifiable mutation in a known hereditary cancer gene.  Because of this, it has been recommended that all individuals with ovarian cancer be offered hereditary cancer genetic testing; however, Immanuel's mother did not complete any cancer-related genetic testing, and so it is appropriate for Immanuel to consider that testing for herself.  We also talked about that there are hereditary cancer syndromes that can link melanoma, colon cancer, and breast cancer to ovarian cancer in some families, and so it is possible that some of the other cancer family history could be related to Immanuel's mother's history of ovarian cancer.      We discussed the natural history and genetics of hereditary cancer syndromes associated with ovarian cancer. A detailed handout regarding some of these hereditary cancer syndromes and the information we discussed was provided to Immanuel at the end of our appointment today and can be found in the after visit summary. Topics included: inheritance pattern, cancer risks, cancer screening  recommendations, and also risks, benefits and limitations of testing.      Based on her family history, Immanuel meets current National Comprehensive Cancer Network (NCCN) criteria for genetic testing of high-penetrance breast and/or ovarian cancer susceptibility genes (including BRCA1, BRCA2, CDH1, PALB2, PTEN, and TP53), because of her mother's personal history of ovarian cancer (and potentially supported by her mother's first cousin's history of early onset breast cancer).      We discussed that there are additional genes besides those listed above that could cause increased risk for ovarian (or other) cancer. As many of these genes present with overlapping features in a family and accurate cancer risk cannot always be established based upon the pedigree analysis alone, it would be reasonable for Immanuel to consider panel genetic testing to analyze multiple genes at once.      Immanuel stated that she was interested in pursuing genetic testing through a panel of genes associated with hereditary cancer syndromes, and so we reviewed the various panel options and their potential benefits and limitations.  After this conversation, Immanuel decided that she was interested in the Common Hereditary Cancers genetic testing panel through [facility].      The Common Hereditary Cancers genetic testing panel through [facility] includes analysis for 47 genes associated with cancers of the breast, ovary, uterus, prostate and gastrointestinal system: APC, MARK, AXIN2, BARD1, BMPR1A, BRCA1, BRCA2, BRIP1, CDH1, CDK4, CDKN2A, CHEK2, CTNNA1, DICER1, EPCAM, GREM1, HOXB13, KIT, MEN1, MLH1, MSH2, MSH3, MSH6, MUTYH, NBN, NF1, NTHL1, PALB2, PDGFRA, PMS2, POLD1, POLE, PTEN,RAD50, RAD51C, RAD51D, SDHA, SDHB, SDHC, SDHD, SMAD4, SMARCA4, STK11, TP53,TSC1, TSC2, and VHL.        We discussed that many of these genes are associated with specific hereditary cancer syndromes and published management guidelines: Hereditary Breast and Ovarian Cancer  syndrome (BRCA1, BRCA2), Field syndrome (MLH1, MSH2, MSH6, PMS2, EPCAM), Familial Adenomatous Polyposis (APC), Hereditary Diffuse Gastric Cancer (CDH1), Familial Atypical Multiple Mole Melanoma syndrome (CDK4, CDKN2A), Multiple Endocrine Neoplasia type 1 (MEN1), Juvenile Polyposis syndrome (BMPR1A, SMAD4), Cowden syndrome (PTEN), Li Fraumeni syndrome (TP53), Hereditary Paraganglioma and Pheochromocytoma syndrome (SDHA, SDHB, SDHC, SDHD), Peutz-Jeghers syndrome (STK11), MUTYH Associated Polyposis (MUTYH), Tuberous sclerosis complex (TSC1, TSC2), Von Hippel-Lindau disease (VHL), and Neurofibromatosis type 1 (NF1). The MARK, AXIN2, BRIP1, CHEK2, GREM1, MSH3, NBN, NTHL1, PALB2, POLD1, POLE, RAD51C, and RAD51D genes are associated with increased cancer risk and have published management guidelines for certain cancers. The remaining genes (BARD1, CTNNA1, DICER1, HOXB13, KIT, PDGFRA, RAD50, and SMARCA4) are associated with increased cancer risk and may allow us to make medical recommendations when mutations are identified.       Medical Management: For Immanuel, we reviewed that the information from genetic testing may determine:    additional cancer screening for which Immanuel may qualify (e.g. mammogram and breast MRI, more frequent colonoscopies, more frequent dermatologic exams, etc.),    options for risk-reducing surgeries Immanuel could consider (e.g. bilateral mastectomy, surgery to remove her ovaries and/or uterus, etc.),      and surgery or targeted therapies for Immanuel if she were to develop certain cancers in the future (e.g.lumpectomy versus mastectomy, immunotherapy for individuals with Field syndrome, PARP inhibitors for HBOC syndrome, etc.).       These recommendations and possible targeted therapies will be discussed in detail once genetic testing is completed.     Plan:  1) Today, Immanuel decided to proceed with the Common Hereditary Cancers genetic testing panel genetic testing panel through [facility].   "Therefore, consent was reviewed and verbally obtained for this testing.    2) We reviewed the options for sample collection: a blood sample or a saliva kit. Immanuel states that she prefers to have her blood drawn for genetic testing, and she plans to do this at the time of her other laboratory testing being requested for her upcoming physical with her primary care provider . Test results are expected to be available within 4-6 weeks of that blood draw.  3) We talked about the process for \"benefits investigation\" through the [facility] medical genetic testing laboratory.  Immanuel was instructed to watch for email communications from [facility] regarding billing.  4) Immanuel will either have a telephone visit or video to discuss the results.  Additional recommendations about screening will be made at that time.    Kiara Ambrose MS, [facility]  Genetic Counselor  Ph: [phone]    Face to face time via video: 36 minutes    Immanuel is a 41 year old who is being evaluated via a billable video visit.      How would you like to obtain your AVS? mail  Will anyone else be joining your video visit? no    Video Start Time: 9:07AM    Video-Visit Details    Type of service:  Video Visit    Video End Time:9:44AM    Originating Location (pt. Location): Home    Distant Location (provider location):  [facility] CANCER [facility]     Platform used for Video Visit: Shirley  "

## 2021-09-14 NOTE — PATIENT INSTRUCTIONS
Assessing Cancer Risk  Only about 5-10% of cancers are thought to be due to an inherited cancer susceptibility gene.    These families often have:    Several people with the same or related types of cancer    Cancers diagnosed at a young age (before age 50)    Individuals with more than one primary cancer    Multiple generations of the family affected with cancer    Some people may be candidates for genetic testing of more than one gene.  For these families, genetic testing using a cancer panel may be offered.  These panels will test different genes known to increase the risk for breast, ovarian, uterine, and/or other cancers. All of the genes discussed below have published clinical management guidelines for individuals who are found to carry a mutation. The purpose of this handout is to serve as a brief summary of the genes analyzed by the panels used to inquire about hereditary breast and gynecologic cancer:  MARK, BRCA1, BRCA2, BRIP1, CDH1, CHEK2, MLH1, MSH2, MSH6, PMS2, EPCAM, PTEN, PALB2, RAD51C, RAD51D, and TP53.  ______________________________________________________________________________  Hereditary Breast and Ovarian Cancer Syndrome   (BRCA1 and BRCA2)  A single mutation in one of the copies of BRCA1 or BRCA2 increases the risk for breast and ovarian cancer, among others.  The risk for pancreatic cancer and melanoma may also be slightly increased in some families.  The chart below shows the chance that someone with a BRCA mutation would develop cancer in his or her lifetime1,2,3,4.        A person s ethnic background is also important to consider, as individuals of Ashkenazi Scientologist ancestry have a higher chance of having a BRCA gene mutation.  There are three BRCA mutations that occur more frequently in this population.    Field Syndrome   (MLH1, MSH2, MSH6, PMS2, and EPCAM)  Currently five genes are known to cause Field Syndrome: MLH1, MSH2, MSH6, PMS2, and EPCAM.  A single mutation in one of the  Field Syndrome genes increases the risk for colon, endometrial, ovarian, and stomach cancers.  Other cancers that occur less commonly in Field Syndrome include urinary tract, skin, and brain cancers.  The chart below shows the chance that a person with Field syndrome would develop cancer in his or her lifetime5.      *Cancer risk varies depending on Field syndrome gene found    Cowden Syndrome   (PTEN)  Cowden syndrome is a hereditary condition that increases the risk for breast, thyroid, endometrial, colon, and kidney cancer.  Cowden syndrome is caused by a mutation in the PTEN gene.  A single mutation in one of the copies of PTEN causes Cowden syndrome and increases cancer risk.  The chart below shows the chance that someone with a PTEN mutation would develop cancer in their lifetime6,7.  Other benign features seen in some individuals with Cowden syndrome include benign skin lesions (facial papules, keratoses, lipomas), learning disability, autism, thyroid nodules, colon polyps, and larger head size.      *One recent study found breast cancer risk to be increased to 85%    Li-Fraumeni Syndrome   (TP53)  Li-Fraumeni Syndrome (LFS) is a cancer predisposition syndrome caused by a mutation in the TP53 gene. A single mutation in one of the copies of TP53 increases the risk for multiple cancers. Individuals with LFS are at an increased risk for developing cancer at a young age. The lifetime risk for development of a LFS-associated cancer is 50% by age 30 and 90% by age 60.   Core Cancers: Sarcomas, Breast, Brain, Lung, Leukemias/Lymphomas, Adrenocortical carcinomas  Other Cancers: Gastrointestinal, Thyroid, Skin, Genitourinary    Hereditary Diffuse Gastric Cancer   (CDH1)  Currently, one gene is known to cause hereditary diffuse gastric cancer (HDGC): CDH1.  Individuals with HDGC are at increased risk for diffuse gastric cancer and lobular breast cancer. Of people diagnosed with HDGC, 30-50% have a mutation in the CDH1  gene.  This suggests there are likely other genes that may cause HDGC that have not been identified yet.      Lifetime Cancer Risks    General Population HDGC    Diffuse Gastric  <1% ~80%   Breast 12% 39-52%         Additional Genes  MARK  MAKR is a moderate-risk breast cancer gene. Women who have a mutation in MARK can have between a 2-4 fold increased risk for breast cancer compared to the general population8. MARK mutations have also been associated with increased risk for pancreatic cancer, however an estimate of this cancer risk is not well understood9. Individuals who inherit two MARK mutations have a condition called ataxia-telangiectasia (AT).  This rare autosomal recessive condition affects the nervous system and immune system, and is associated with progressive cerebellar ataxia beginning in childhood.  Individuals with ataxia-telangiectasia often have a weakened immune system and have an increased risk for childhood cancers.    PALB2  Mutations in PALB2 have been shown to increase the risk of breast cancer up to 33-58% in some families; where individuals fall within this risk range is dependent upon family [id number]. PALB2 mutations have also been associated with increased risk for pancreatic cancer, although this risk has not been quantified yet.  Individuals who inherit two PALB2 mutations--one from their mother and one from their father--have a condition called Fanconi Anemia.  This rare autosomal recessive condition is associated with short stature, developmental delay, bone marrow failure, and increased risk for childhood cancers.    CHEK2   CHEK2 is a moderate-risk breast cancer gene.  Women who have a mutation in CHEK2 have around a 2-fold increased risk for breast cancer compared to the general population, and this risk may be higher depending upon family history.11,12,13 Mutations in CHEK2 have also been shown to increase the risk of a number of other cancers, including colon and prostate, however  these cancer risks are currently not well understood.    BRIP1, RAD51C and RAD51D  Mutations in BRIP1, RAD51C, and RAD51D have been shown to increase the risk of ovarian cancer and possibly female breast cancer as well14,15 .       Lifetime Cancer Risk    General Population BRIP1 RAD51C RAD51D   Ovarian 1-2% ~5-8% ~5-9% ~7-15%           Inheritance  All of the cancer syndromes reviewed above are inherited in an autosomal dominant pattern.  This means that if a parent has a mutation, each of his or her children will have a 50% chance of inheriting that same mutation.  Therefore, each child--male or female--would have a 50% chance of being at increased risk for developing cancer.      Image obtained from Genetics Home Reference, 2013     Mutations in some genes can occur de natalio, which means that a person s mutation occurred for the first time in them and was not inherited from a parent.  Now that they have the mutation, however, it can be passed on to future generations.    Genetic Testing  Genetic testing involves a blood test and will look at the genetic information in the MARK, BRCA1, BRCA2, BRIP1, CDH1, CHEK2, MLH1, MSH2, MSH6, PMS2, EPCAM, PTEN, PALB2, RAD51C, RAD51D, and TP53 genes for any harmful mutations that are associated with increased cancer risk.  If possible, it is recommended that the person(s) who has had cancer be tested before other family members.  That person will give us the most useful information about whether or not a specific gene is associated with the cancer in the family.    Results  There are three possible results of genetic testing:    Positive--a harmful mutation was identified in one or more of the genes    Negative--no mutation was identified in any of the genes on this panel    Variant of unknown significance--a variation in one of the genes was identified, but it is unclear how this impacts cancer risk in the family    Advantages and Disadvantages   There are advantages and  disadvantages to genetic testing.    Advantages    May clarify your cancer risk    Can help you make medical decisions    May explain the cancers in your family    May give useful information to your family members (if you share your results)    Disadvantages    Possible negative emotional impact of learning about inherited cancer risk    Uncertainty in interpreting a negative test result in some situations    Possible genetic discrimination concerns (see below)    Genetic Information Nondiscrimination Act (RON)  RON is a federal law that protects individuals from health insurance or employment discrimination based on a genetic test result alone.  Although rare, there are currently no legal discrimination protections in terms of life insurance, long term care, or disability insurances.  Visit the [employer] website to learn more.    Reducing Cancer Risk  All of the genes described above have nationally recognized cancer screening guidelines that would be recommended for individuals who test positive.  In addition to increased cancer screening, surgeries may be offered or recommended to reduce cancer risk.  Recommendations are based upon an individual s genetic test result as well as their personal and family history of cancer.    Questions to Think About Regarding Genetic Testing:    What effect will the test result have on me and my relationship with my family members if I have an inherited gene mutation?  If I don t have a gene mutation?    Should I share my test results, and how will my family react to this news, which may also affect them?    Are my children ready to learn new information that may one day affect their own health?    Hereditary Cancer Resources    FORCE: Facing Our Risk of Cancer Empowered facingourrisk.org   Bright Pink bebrightpink.org   Li-Fraumeni Syndrome Association lfsassociation.org   PTEN World PTENworld.com   No stomach for cancer, Inc.  nostomachforcancer.org   Stomach cancer relief network Scrnet.org   Collaborative Group of the Americas on Inherited Colorectal Cancer (CGA) cgaicc.com    Cancer Care cancercare.org   American Cancer Society (ACS) cancer.org   National Cancer Frost (NCI) cancer.gov     Please call us if you have any questions or concerns.   Cancer Risk Management Program [phone]-CANCER ([phone])  ? Anton Jacobs, MS, [facility] [phone]   X   Kiara Ambrose, MS, [facility]  [phone]    [email or web address]   ? Niurka Rosatiffanie, MS, [facility]  [phone]  ? Angelina Chou, MS, [facility] [phone]  ? Yu Laury, MS, [facility] [phone]  ? Vianney Reagan, MS, [facility]  [phone]  ? Mari Alvarado, MS  [phone]    References  1. Klarissa MCKEON, Ernestina PDP, Leelee S, Manpreet MAGANA, Tyra JE, Paul JL, Darien N, Toño H, Clara O, Cleveland A, Niteshini B, Radigayathri P, Mananthony S, Marisol DM, Waqas N, Carin E, Juan H, Chidi E, Delfina J, Nino J, Tomas B, Rosangela H, Thorlacius S, Eerola H, Tierney H, Jos K, Shaun OP. Average risks of breast and ovarian cancer associated with BRCA1 or BRCA2 mutations detected in case series unselected for family history: a combined analysis of 222 studies. Am J Hum Nena. 2003;72:1117-30.  2. Mike N, Yoli M, Adamson G.  BRCA1 and BRCA2 Hereditary Breast and Ovarian Cancer. Gene Reviews online. 2013.  3. Edgardo YC, Ko S, Yosi G, Sheth S. Breast cancer risk among male BRCA1 and BRCA2 mutation carriers. J Natl Cancer Inst. 2007;99:1811-4.  4. Zeke FINNEGAN, Merlin I, Diego J, Chidi E, Vanessa ER, Cari F. Risk of breast cancer in male BRCA2 carriers. J Med Nena. 2010;47:710-1.  5. National Comprehensive Cancer Network. Clinical practice guidelines in oncology, colorectal cancer screening. Available online (registration required). 2015.  6. Taurus RUIZ, Gladys J, Becky J, Anali BLAND, Conner ORR, Renita C. Lifetime cancer risks in individuals with germline PTEN mutations. Clin Cancer Res.  2012;18:400-7.  7. Mandy SERRA. Cowden Syndrome: A Critical Review of the Clinical Literature. J Nena . 2009:18:13-27.  8. Shalonda MCKEON, Junior D, Corrie S, Melissa P, Joyce T, Kelsy M, Darinel B, Dandre H, Jennifer R, Andrei K, Tres L, Zeke DG, Marisol D, Fernando DF, Sandoval MR, The Breast Cancer Susceptibility Collaboration ([place]) & Casey LUZ. MARK mutations that cause ataxia-telangiectasia are breast cancer susceptibility alleles. Nature Genetics. 2006;38:873-875  9. Attila N , Amado Y, Huma J, Joelle L, Barbara GM , Juli ML, Gallinger S, Gutierrez AG, Syngal S, Derek ML, Sandra J , Vinay R, Jen SZ, Kaiser JR, Amy VE, Neil M, Vogelstein B, Lincoln N, Kayce RH, Michael KW, and Laure AP. MARK mutations in patients with hereditary pancreatic cancer. Cancer Discover. 2012;2:41-46  10. Klarissa BRICEÑO, et al. Breast-Cancer Risk in Families with Mutations in PALB2. NEJM. 2014; 371(6):497-506.  11. CHEK2 Breast Cancer Case-Control Consortium. CHEK2*1100delC and susceptibility to breast cancer: A collaborative analysis involving 10,860 breast cancer cases and 9,065 controls from 10 studies. Am J Hum Nena, 74 (2004), pp. 2839-5445  12. Chelsie T, Carson S, Nestor K, et al. Spectrum of Mutations in BRCA1, BRCA2, CHEK2, and TP53 in Families at High Risk of Breast Cancer. MANN. 2006;295(12):4308-4190.   13. Steven BANDA, Oanh GARDNER, Jorge Luis A, et al. Risk of breast cancer in women with a CHEK2 mutation with and without a family history of breast cancer. J Clin Oncol. 2011;29:6301-1411.  14. Quintin H, Porfirio E, Mega SJ, et al. Contribution of germline mutations in the RAD51B, RAD51C, and RAD51D genes to ovarian cancer in the population. J Clin Oncol. 2015;33(26):0535-0586. Doi:10.1200/JCO.2015.61.2408.  15. Lizeth T, Nhan FARIAS, Jewels P, et al. Mutations in BRIP1 confer high risk of ovarian cancer. Janell Nena. 2011;43(11):0203-7836. doi:10.1038/ng.955.

## 2021-09-14 NOTE — LETTER
9/14/2021         RE: Immanuel Ryder  [address]  [facility] [address]        Dear Colleague,    Thank you for referring your patient, Immanuel Ryder, to the [facility]. Please see a copy of my visit note below.    Cancer Risk Management Program Genetic Counseling Notes    9/14/2021    Referring Provider:  Dr. Leatha Robles    Presenting Information:   I had a video visit with Immanuel Ryder today for genetic counseling through the Cancer Risk Management Program, in order to discuss her family history of breast, ovarian, and other cancer.  She presents today to review this history, cancer screening recommendations, and available genetic testing options.  This consultation took place via a video visit due to current COVID-19 restrictions (see attestation below).    Personal History:  Immanuel is a 41 year old female. She does not have any personal history of cancer.  Last month, mImanuel presented for evaluation because of a left breast lump.  Subsequent imaging of that area was consistent with a breast cyst, and so no breast biopsy was recommended.  Immanuel does report that she has previously had another breast cyst aspirated in the past.  In addition. Immanuel states that she has had previous cysts removed from other parts of her body, including one from her back and 2 from above her eyebrow; she was not sure about what type of cysts those were thought to be.      As part of Immanuel's assessment for her most recent breast lump, Immanuel's provider had again brought up the possibility of genetic testing to provide more information to Immanuel about her potential cancer risks; Immanuel had previously had genetic counseling a few years ago, but she did not proceed with genetic testing at that time.  In other medical history, Immanuel reports that she takes medication for depression.  Other than this, she reports no significant health events or chronic medical issues.   "    With respect to her other cancer screening, Immanuel reports that she had a colonoscopy in her 20s because of some \"stomach issues.\"  She states that no colon polyps or other obvious abnormalities were noted in her colon.  She reports that a possible diagnosis of irritable bowel syndrome was discussed but that she has not had any formal treatment for that condition.  Immanuel does report having a previous atypical mole removed and also having a couple of areas \"frozen off\" her face; she states that she is due for dermatology follow-up.    Immanuel reports no personal history of smoking and no personal history of chronic, excessive alcohol use.  She does report that her mother smoked cigarettes, and so Immanuel had secondhand exposure to cigarette smoke as a child. Immanuel states that she is not aware of any other personal history of any specific, potentially concerning environmental exposures with respect to cancer risk.    Family History: Today, we updated the family history taken during her genetic counseling appointment a few years ago. (Please see scanned pedigree for detailed family history information)    Immanuel reports that her mother was diagnosed with ovarian cancer at the age of 67 and, unfortunately,  of complications of cancer within that same year.  Immanuel states that her mother was found to have cancer in her lung too, but it is not clear to Immanuel if this was a primary lung cancer or a metastasis from the ovarian cancer.    Immanuel states that her maternal grandfather was diagnosed with colon cancer in his early 60s and melanoma in his early 70s.  She thought he may have had a history of other skin cancers, as well.  She states that this grandfather  because of causes unrelated to cancer.    Immanuel reports that her mother has a maternal first-cousin who was diagnosed with breast cancer around the age of 45.    Immanuel states that her mother also had a maternal aunt that had a " "hysterectomy in her early 40s because of a \"growth the size of a softball\"; however, it is not known whether this was a cancerous or benign growth.    Immanuel reports that her mother's maternal grandmother  of some type of cancer at an older age, but the type of cancer is not known to the family    Immanuel states that she has a paternal aunt who was diagnosed with breast cancer in her late 60s.  She states that she thinks that this paternal aunt may have had some cancer-related genetic testing, but she does not know the details of this testing.    Immanuel reports that her paternal grandfather  of complications of lung cancer in his late 50s.    Immanuel does not know a lot of details about her ancestry, but she thinks that she is generally of  ancestry. There is no reported consanguinity.    Discussion:    We briefly reviewed the features of sporadic, familial, and hereditary cancers. In looking at Immanuel's family history, it is possible that a cancer susceptibility gene is present due to Immanuel's mother's history of ovarian cancer.  We talked about that it has been estimated that as many as 1 in 4 ovarian cancers is related to an identifiable mutation in a known hereditary cancer gene.  Because of this, it has been recommended that all individuals with ovarian cancer be offered hereditary cancer genetic testing; however, Immanuel's mother did not complete any cancer-related genetic testing, and so it is appropriate for Immanuel to consider that testing for herself.  We also talked about that there are hereditary cancer syndromes that can link melanoma, colon cancer, and breast cancer to ovarian cancer in some families, and so it is possible that some of the other cancer family history could be related to Immanuel's mother's history of ovarian cancer.      We discussed the natural history and genetics of hereditary cancer syndromes associated with ovarian cancer. A detailed handout regarding " some of these hereditary cancer syndromes and the information we discussed was provided to Immanuel at the end of our appointment today and can be found in the after visit summary. Topics included: inheritance pattern, cancer risks, cancer screening recommendations, and also risks, benefits and limitations of testing.      Based on her family history, Immanuel meets current National Comprehensive Cancer Network (NCCN) criteria for genetic testing of high-penetrance breast and/or ovarian cancer susceptibility genes (including BRCA1, BRCA2, CDH1, PALB2, PTEN, and TP53), because of her mother's personal history of ovarian cancer (and potentially supported by her mother's first cousin's history of early onset breast cancer).      We discussed that there are additional genes besides those listed above that could cause increased risk for ovarian (or other) cancer. As many of these genes present with overlapping features in a family and accurate cancer risk cannot always be established based upon the pedigree analysis alone, it would be reasonable for Immanuel to consider panel genetic testing to analyze multiple genes at once.      Imamnuel stated that she was interested in pursuing genetic testing through a panel of genes associated with hereditary cancer syndromes, and so we reviewed the various panel options and their potential benefits and limitations.  After this conversation, Immanuel decided that she was interested in the Common Hereditary Cancers genetic testing panel through [facility].      The Common Hereditary Cancers genetic testing panel through [facility] includes analysis for 47 genes associated with cancers of the breast, ovary, uterus, prostate and gastrointestinal system: APC, MARK, AXIN2, BARD1, BMPR1A, BRCA1, BRCA2, BRIP1, CDH1, CDK4, CDKN2A, CHEK2, CTNNA1, DICER1, EPCAM, GREM1, HOXB13, KIT, MEN1, MLH1, MSH2, MSH3, MSH6, MUTYH, NBN, NF1, NTHL1, PALB2, PDGFRA, PMS2, POLD1, POLE, PTEN,RAD50, RAD51C, RAD51D,  SDHA, SDHB, SDHC, SDHD, SMAD4, SMARCA4, STK11, TP53,TSC1, TSC2, and VHL.        We discussed that many of these genes are associated with specific hereditary cancer syndromes and published management guidelines: Hereditary Breast and Ovarian Cancer syndrome (BRCA1, BRCA2), Field syndrome (MLH1, MSH2, MSH6, PMS2, EPCAM), Familial Adenomatous Polyposis (APC), Hereditary Diffuse Gastric Cancer (CDH1), Familial Atypical Multiple Mole Melanoma syndrome (CDK4, CDKN2A), Multiple Endocrine Neoplasia type 1 (MEN1), Juvenile Polyposis syndrome (BMPR1A, SMAD4), Cowden syndrome (PTEN), Li Fraumeni syndrome (TP53), Hereditary Paraganglioma and Pheochromocytoma syndrome (SDHA, SDHB, SDHC, SDHD), Peutz-Jeghers syndrome (STK11), MUTYH Associated Polyposis (MUTYH), Tuberous sclerosis complex (TSC1, TSC2), Von Hippel-Lindau disease (VHL), and Neurofibromatosis type 1 (NF1). The MARK, AXIN2, BRIP1, CHEK2, GREM1, MSH3, NBN, NTHL1, PALB2, POLD1, POLE, RAD51C, and RAD51D genes are associated with increased cancer risk and have published management guidelines for certain cancers. The remaining genes (BARD1, CTNNA1, DICER1, HOXB13, KIT, PDGFRA, RAD50, and SMARCA4) are associated with increased cancer risk and may allow us to make medical recommendations when mutations are identified.       Medical Management: For Immanuel, we reviewed that the information from genetic testing may determine:    additional cancer screening for which Immanuel may qualify (e.g. mammogram and breast MRI, more frequent colonoscopies, more frequent dermatologic exams, etc.),    options for risk-reducing surgeries Immanuel could consider (e.g. bilateral mastectomy, surgery to remove her ovaries and/or uterus, etc.),      and surgery or targeted therapies for Immanuel if she were to develop certain cancers in the future (e.g.lumpectomy versus mastectomy, immunotherapy for individuals with Field syndrome, PARP inhibitors for HBOC syndrome, etc.).       These  "recommendations and possible targeted therapies will be discussed in detail once genetic testing is completed.     Plan:  1) Today, Immanuel decided to proceed with the Common Hereditary Cancers genetic testing panel genetic testing panel through [facility].  Therefore, consent was reviewed and verbally obtained for this testing.    2) We reviewed the options for sample collection: a blood sample or a saliva kit. Immanuel states that she prefers to have her blood drawn for genetic testing, and she plans to do this at the time of her other laboratory testing being requested for her upcoming physical with her primary care provider . Test results are expected to be available within 4-6 weeks of that blood draw.  3) We talked about the process for \"benefits investigation\" through the [facility] medical genetic testing laboratory.  Immanuel was instructed to watch for email communications from [facility] regarding billing.  4) Immanuel will either have a telephone visit or video to discuss the results.  Additional recommendations about screening will be made at that time.    Kiara Ambrose MS, [facility]  Genetic Counselor  Ph: [phone]    Face to face time via video: 36 minutes    Immanuel is a 41 year old who is being evaluated via a billable video visit.      How would you like to obtain your AVS? mail  Will anyone else be joining your video visit? no    Video Start Time: 9:07AM    Video-Visit Details    Type of service:  Video Visit    Video End Time:9:44AM    Originating Location (pt. Location): Home    Distant Location (provider location):  [facility] CANCER [facility]     Platform used for Video Visit: Well      Again, thank you for allowing me to participate in the care of your patient.        Sincerely,        Juana Ambrose GC    "

## 2021-09-19 ENCOUNTER — HEALTH MAINTENANCE LETTER (OUTPATIENT)
Age: 41
End: 2021-09-19

## 2021-09-29 ENCOUNTER — OFFICE VISIT (OUTPATIENT)
Dept: FAMILY MEDICINE | Facility: CLINIC | Age: 41
End: 2021-09-29
Payer: COMMERCIAL

## 2021-09-29 VITALS
TEMPERATURE: 98.2 F | BODY MASS INDEX: 30.07 KG/M2 | HEIGHT: 67 IN | SYSTOLIC BLOOD PRESSURE: 118 MMHG | DIASTOLIC BLOOD PRESSURE: 81 MMHG | OXYGEN SATURATION: 98 % | WEIGHT: 191.6 LBS | HEART RATE: 74 BPM

## 2021-09-29 DIAGNOSIS — Z00.00 PREVENTATIVE HEALTH CARE: Primary | ICD-10-CM

## 2021-09-29 DIAGNOSIS — R53.83 FATIGUE, UNSPECIFIED TYPE: ICD-10-CM

## 2021-09-29 DIAGNOSIS — L57.0 ACTINIC KERATOSIS: ICD-10-CM

## 2021-09-29 DIAGNOSIS — J02.9 ACUTE PHARYNGITIS, UNSPECIFIED ETIOLOGY: ICD-10-CM

## 2021-09-29 DIAGNOSIS — K59.00 CONSTIPATION, UNSPECIFIED CONSTIPATION TYPE: ICD-10-CM

## 2021-09-29 DIAGNOSIS — R23.3 BRUISES EASILY: ICD-10-CM

## 2021-09-29 LAB
DEPRECATED S PYO AG THROAT QL EIA: NEGATIVE
GROUP A STREP BY PCR: NOT DETECTED

## 2021-09-29 PROCEDURE — 99213 OFFICE O/P EST LOW 20 MIN: CPT | Mod: 25 | Performed by: FAMILY MEDICINE

## 2021-09-29 PROCEDURE — 87651 STREP A DNA AMP PROBE: CPT | Performed by: FAMILY MEDICINE

## 2021-09-29 PROCEDURE — 99395 PREV VISIT EST AGE 18-39: CPT | Performed by: FAMILY MEDICINE

## 2021-09-29 ASSESSMENT — MIFFLIN-ST. JEOR: SCORE: 1576.72

## 2021-09-29 NOTE — PROGRESS NOTES
"    Assessment & Plan     Preventative health care  Up to date on most care  Will start annual mammograms with clinical breast exam. Just did and negative  Not sure when/if she should get MRIs.  She will be getting gene testing    Fatigue, unspecified type  Longstanding with apple watch noting no deep sleep. Not sure of the accuracy of the watch but per her history of never feeling rested despite the hours she sleeps, full eval by sleep medicine is warranted. Will add some blood work as well.   - SLEEP EVALUATION & MANAGEMENT REFERRAL - ADULT -; Future  - Comprehensive metabolic panel; Future  - TSH; Future  - Vitamin D Level; Future  - Lipid panel; Future  -Ferritin    Actinic keratosis  - Adult Dermatology Referral; Future    Acute pharyngitis, unspecified etiology  Strep exposed.   - Streptococcus A Rapid Screen w/Reflex to PCR - Clinic Collect  - Group A Streptococcus PCR Throat Swab    Constipation, unspecified constipation type  Likely IBS and so reviewed that daily metamucil might help regulate her since on the more constipated side. Does drink liquids and eat a lot of veg/fruit so may have a more sluggish colon.  Aware may have more gas and if so, to drop the dose in half to see if tolerates and then increase    Bruises easily  - CBC with platelets; Future             BMI:   Estimated body mass index is 30.01 kg/m  as calculated from the following:    Height as of this encounter: 1.702 m (5' 7\").    Weight as of this encounter: 86.9 kg (191 lb 9.6 oz).           No follow-ups on file.    Leatha Robles MD  [facility] BHARGAVI Gambino is a 41 year old who presents for the following health issues     HPI   Preventive visit:  ? At what age should se start screening for breast cancer with mammo?  Likely now.     MDD - how doing?  Despite being on meds is still exhausted all the time. Lack of interest and not motivated to do anything. Not sure sure about sad - maybe. Did bump her " "wellbutrin without effect.     Sleep - has forever been exhausted and then got an Apple Watch and she hardly ever registers deep sleep. So worried about that. No matter how much sleep she gets she wakes up exhausted.  Wants to find out the primary reason why and not wanting sleeping pills. She does not snore.      10,000 dollar deductible and so wanting to do all this in January.     Breast lumps - gets them intermittently and very painful and just had them again - last time before was several years ago.   Is planning on doing the genetic testing.     Also has       and she eat the same and she and he have very different BMs.    She has BMs that change every few months.  Right now she is having BMs almost every day, more firm and more pasty. Will also have times where it is easier for her to have a BM and will be once to twice a day.  Periods of time where the smell is not there.    Also her BMs are very smelly.        Sore throat today and was around someone with strep.  Her tonsils are a bit big too and hurts to swallow.  COVID negative 2 days ago.     Pre-COVID she was always getting sick and now less although gets more achey than some. She feels cold but also feels like her skin is warm. Going on for ever.     Wt Readings from Last 4 Encounters:   09/29/21 86.9 kg (191 lb 9.6 oz)   08/25/21 87.5 kg (192 lb 14.4 oz)   07/16/19 80.8 kg (178 lb 3.2 oz)   11/30/18 82.3 kg (181 lb 6.4 oz)     Health habits:  Not exercising - was going to the gym to swim.   Stopped etoh during the pandemic  No other substances  Lots of fruits and vegetables             Review of Systems         Objective    /81   Pulse 74   Temp 98.2  F (36.8  C) (Oral)   Ht 1.702 m (5' 7\")   Wt 86.9 kg (191 lb 9.6 oz)   LMP 09/04/2021 (Exact Date)   SpO2 98%   Breastfeeding No   BMI 30.01 kg/m    Body mass index is 30.01 kg/m .  Physical Exam   GENERAL: healthy, alert and no distress  NECK: no adenopathy, no asymmetry, masses, or " scars and thyroid normal to palpation  RESP: lungs clear to auscultation - no rales, rhonchi or wheezes  CV: regular rate and rhythm, normal S1 S2, no S3 or S4, no murmur, click or rub, no peripheral edema and peripheral pulses strong  ABDOMEN: soft, nontender, no hepatosplenomegaly, no masses and bowel sounds normal  Nodes: none axillary, groin or neck  MS: no gross musculoskeletal defects noted, no edema

## 2021-09-29 NOTE — PATIENT INSTRUCTIONS
Patient Education   Here is the plan from today's visit    1. Preventative health care  Come back for the lab work, including the cholesterol. Everything else is up to date  Think about ways to increase activity/yoga while you can't go to the gym    2. Fatigue, unspecified type  - SLEEP EVALUATION & MANAGEMENT REFERRAL - ADULT -; Future  - Comprehensive metabolic panel; Future  - TSH; Future  - Vitamin D Level; Future  - Lipid panel; Future    3. Actinic keratosis  - Adult Dermatology Referral; Future    4. Acute pharyngitis, unspecified etiology  - Rapid Strep Screen Throat Swab    5. Constipation, unspecified constipation type  Continue to drink a lot of liquids  Add metamucil - one packet or scoop per day   Alternative is Miralax     6. Bruises easily  - CBC with platelets; Future      Please call or return to clinic if your symptoms don't go away.    Follow up plan  No follow-ups on file.    Thank you for coming to [facility] today.  COVID-19 Vaccine:  [facility] has walk-in appointments for COVID-19 vaccines. No appointment needed!   You also have the option of receiving Pfizer vaccine during your physician appointment. Please ask your care team for more information!  Lab Testing:  **If you had lab testing today and your results are reassuring or normal they will be mailed to you or sent through [facility] within 7 days.   **If the lab tests need quick action we will call you with the results.  **If you are having labs done on a different day, please call [phone] to schedule at [facility] or [phone] for other [facility] locations.   The phone number we will call with results is # [phone] (home) . If this is not the best number please call our clinic and change the number.  Medication Refills:  If you need any refills please call your pharmacy and they will contact us.   If you need to  your refill at a new pharmacy, please contact the new pharmacy directly.  The new pharmacy will help you get your medications transferred faster.   Scheduling:  If you have any concerns about today's visit or wish to schedule another appointment please call our office during normal business hours [phone] (8-5:00 M-F)  If a referral was made to a [facility] and you don't get a call from central scheduling please call [phone].  If a Mammogram was ordered for you at [facility] call [phone] to schedule or change your appointment.  If you had an EKG/XRay/CT/Ultrasound/MRI ordered the number is [phone] to schedule or change your radiology appointment.   Medical Concerns:  If you have urgent medical concerns please call [phone] at any time of the day.    Leatha Robles MD     Dermatology  [phone]  Anahi Kelsey Christie R Hi Christie!     I reached out to this patient and left her a voicemail as well as sending a [employer] message.   Thanks and happy Monday!     Anahi Field Messages       ----- Message -----   From: Sera Pena   Sent: 10/29/2021   9:38 AM CDT   To: Anahi Kelsey       ----- Message -----   From: Becky Rea   Sent: 10/29/2021   9:33 AM CDT   To: Adult Referral Specialist Team     Hello,     Could you please let me know status of Dermatology referral written on 9/29/21 for my provider, thank you so much.     Becky Ruiz

## 2021-11-03 ENCOUNTER — IMMUNIZATION (OUTPATIENT)
Dept: FAMILY MEDICINE | Facility: CLINIC | Age: 41
End: 2021-11-03
Payer: COMMERCIAL

## 2021-11-03 DIAGNOSIS — Z80.41 FAMILY HISTORY OF MALIGNANT NEOPLASM OF OVARY: ICD-10-CM

## 2021-11-03 DIAGNOSIS — Z23 NEED FOR PROPHYLACTIC VACCINATION AND INOCULATION AGAINST INFLUENZA: Primary | ICD-10-CM

## 2021-11-03 DIAGNOSIS — R53.83 FATIGUE, UNSPECIFIED TYPE: ICD-10-CM

## 2021-11-03 DIAGNOSIS — R23.3 BRUISES EASILY: ICD-10-CM

## 2021-11-03 DIAGNOSIS — Z80.3 FAMILY HISTORY OF MALIGNANT NEOPLASM OF BREAST: ICD-10-CM

## 2021-11-03 LAB
ALBUMIN SERPL-MCNC: 4.1 G/DL (ref 3.4–5)
ALP SERPL-CCNC: 70 U/L (ref 40–150)
ALT SERPL W P-5'-P-CCNC: 16 U/L (ref 0–50)
ANION GAP SERPL CALCULATED.3IONS-SCNC: 5 MMOL/L (ref 3–14)
AST SERPL W P-5'-P-CCNC: 10 U/L (ref 0–45)
BILIRUB SERPL-MCNC: 0.7 MG/DL (ref 0.2–1.3)
BUN SERPL-MCNC: 10 MG/DL (ref 7–30)
CALCIUM SERPL-MCNC: 9.1 MG/DL (ref 8.5–10.1)
CHLORIDE BLD-SCNC: 104 MMOL/L (ref 94–109)
CHOLEST SERPL-MCNC: 147 MG/DL
CO2 SERPL-SCNC: 27 MMOL/L (ref 20–32)
CREAT SERPL-MCNC: 0.78 MG/DL (ref 0.52–1.04)
ERYTHROCYTE [DISTWIDTH] IN BLOOD BY AUTOMATED COUNT: 12.1 % (ref 10–15)
FASTING STATUS PATIENT QL REPORTED: YES
FERRITIN SERPL-MCNC: 26 NG/ML (ref 12–150)
GFR SERPL CREATININE-BSD FRML MDRD: >90 ML/MIN/1.73M2
GLUCOSE BLD-MCNC: 88 MG/DL (ref 70–99)
HCT VFR BLD AUTO: 40.5 %
HDLC SERPL-MCNC: 49 MG/DL
HGB BLD-MCNC: 13 G/DL (ref 11.7–15.7)
LDLC SERPL CALC-MCNC: 90 MG/DL
MCH RBC QN AUTO: 30.4 PG (ref 26.5–33)
MCHC RBC AUTO-ENTMCNC: 32.1 G/DL (ref 31.5–36.5)
MCV RBC AUTO: 95 FL (ref 78–100)
NONHDLC SERPL-MCNC: 98 MG/DL
PLATELET # BLD AUTO: 294 10E3/UL (ref 150–450)
POTASSIUM BLD-SCNC: 4.8 MMOL/L (ref 3.4–5.3)
PROT SERPL-MCNC: 7.6 G/DL (ref 6.8–8.8)
RBC # BLD AUTO: 4.28 10E6/UL (ref 3.8–5.2)
SODIUM SERPL-SCNC: 136 MMOL/L (ref 133–144)
TRIGL SERPL-MCNC: 38 MG/DL
TSH SERPL DL<=0.005 MIU/L-ACNC: 1.82 MU/L (ref 0.4–4)
WBC # BLD AUTO: 4.3 10E3/UL (ref 4–11)

## 2021-11-03 PROCEDURE — 99207 PR NO CHARGE LOS: CPT

## 2021-11-03 PROCEDURE — 82728 ASSAY OF FERRITIN: CPT

## 2021-11-03 PROCEDURE — 80053 COMPREHEN METABOLIC PANEL: CPT

## 2021-11-03 PROCEDURE — 84443 ASSAY THYROID STIM HORMONE: CPT

## 2021-11-03 PROCEDURE — 90471 IMMUNIZATION ADMIN: CPT

## 2021-11-03 PROCEDURE — 36415 COLL VENOUS BLD VENIPUNCTURE: CPT

## 2021-11-03 PROCEDURE — 85027 COMPLETE CBC AUTOMATED: CPT

## 2021-11-03 PROCEDURE — 90686 IIV4 VACC NO PRSV 0.5 ML IM: CPT

## 2021-11-03 PROCEDURE — 80061 LIPID PANEL: CPT

## 2021-11-03 PROCEDURE — 0064A COVID-19,PF,MODERNA (18+ YRS PRIMARY SERIES .5ML): CPT

## 2021-11-03 PROCEDURE — 91301 COVID-19,PF,MODERNA (18+ YRS PRIMARY SERIES .5ML): CPT

## 2021-11-03 PROCEDURE — 82306 VITAMIN D 25 HYDROXY: CPT

## 2021-11-04 LAB — DEPRECATED CALCIDIOL+CALCIFEROL SERPL-MC: 29 UG/L (ref 20–75)

## 2021-11-12 LAB
Lab: NORMAL
PERFORMING LABORATORY: NORMAL
SPECIMEN STATUS: NORMAL
TEST NAME: NORMAL

## 2021-11-17 DIAGNOSIS — F33.41 RECURRENT MAJOR DEPRESSIVE DISORDER, IN PARTIAL REMISSION (H): ICD-10-CM

## 2021-11-17 RX ORDER — BUPROPION HYDROCHLORIDE 75 MG/1
TABLET ORAL
Qty: 360 TABLET | Refills: 3 | Status: SHIPPED | OUTPATIENT
Start: 2021-11-17 | End: 2021-12-14

## 2021-11-17 NOTE — TELEPHONE ENCOUNTER
"Request for medication refill:  buPROPion (WELLBUTRIN) 75 MG tablet  Providers if patient needs an appointment and you are willing to give a one month supply please refill for one month and  send a letter/MyChart using \".SMILLIMITEDREFILL\" .smillimited and route chart to \"P [facility] \" (Giving one month refill in non controlled medications is strongly recommended before denial)    If refill has been denied, meaning absolutely no refills without visit, please complete the smart phrase \".smirxrefuse\" and route it to the \"P [facility] MED REFILLS\"  pool to inform the patient and the pharmacy.    Carito Stinson        "

## 2021-12-08 ENCOUNTER — VIRTUAL VISIT (OUTPATIENT)
Dept: ONCOLOGY | Facility: CLINIC | Age: 41
End: 2021-12-08
Attending: GENETIC COUNSELOR, MS
Payer: COMMERCIAL

## 2021-12-08 DIAGNOSIS — Z80.3 FAMILY HISTORY OF MALIGNANT NEOPLASM OF BREAST: ICD-10-CM

## 2021-12-08 DIAGNOSIS — Z80.41 FAMILY HISTORY OF MALIGNANT NEOPLASM OF OVARY: Primary | ICD-10-CM

## 2021-12-08 PROCEDURE — 999N000069 HC STATISTIC GENETIC COUNSELING, < 16 MIN: Mod: GT,95 | Performed by: GENETIC COUNSELOR, MS

## 2021-12-08 NOTE — LETTER
12/8/2021         RE: Janet Ryder  [address]  [facility] [address]        Dear Colleague,    Thank you for referring your patient, Janet Ryder, to the [facility]. Please see a copy of my visit note below.    Cancer Risk Management Program Genetic Counseling Note    12/8/2021    Referring Provider: Dr. Leatha Robles    Presenting Information:  Janet had a return telephone visit though the Cancer Risk Management Program, in order to discuss her genetic testing results. Her blood was drawn on 11-3-2021.  A Common Hereditary Cancers genetic testing panel was requested from [facility]. This testing was done because of her family history of ovarian, breast, and other cancer.    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 This variant is called c.871C>T (also known as p.[id number]). No other variants or mutations were found in the RAD51D gene. Given the uncertain significance of this result, medical management decisions should NOT be made based on this test result alone.    Of note, Janet tested negative for mutations (or variant os unknown significance) in the following genes:  APC, MARK, AXIN2, BARD1, BMPR1A, BRCA1, BRCA2, BRIP1, CDH1, CDK4, CDKN2A, CHEK2, CTNNA1, DICER1, EPCAM, GREM1, HOXB13, KIT, MEN1, MLH1, MSH2, MSH3, MSH6, MUTYH, NBN, NF1, NTHL1, PALB2, PDGFRA, PMS2, POLD1, POLE, PTEN, RAD50, RAD51C, SDHA, SDHB, SDHC, SDHD, SMAD4, SMARCA4, STK11, TP53,TSC1, TSC2, and VHL.      Therefore, genetic testing did not detect an identifiable mutation associated with Hereditary Breast and Ovarian Cancer syndrome (BRCA1, BRCA2), Field syndrome (MLH1, MSH2, MSH6, PMS2, EPCAM), Familial Adenomatous Polyposis (APC), Hereditary Diffuse Gastric Cancer (CDH1), Familial Atypical Multiple Mole Melanoma syndrome (CDK4, CDKN2A), Juvenile Polyposis syndrome (BMPR1A, SMAD4),  "Cowden syndrome (PTEN), Li Fraumeni syndrome (TP53), Peutz-Jeghers syndrome (STK11), MUTYH Associated Polyposis (MUTYH), Hereditary Paraganglioma and Pheochromocytoma syndrome (SDHA, SDHB, SDHC, SDHD), Tuberous sclerosis complex (TSC1, TSC2), Von Hippel-Lindau disease (VHL), Neurofibromatosis type 1 (NF1), and Multiple Endocrine Neoplasia type 1 (MEN1).    A copy of the test report can be found in the Laboratory tab, dated 11-3-2021, and named \"LABORATORY MISCELLANEOUS ORDER.\" The report is scanned in as a linked document.    Interpretation:  We discussed several different interpretations of this inconclusive test result. It is not clear if this variant in the RAD51D gene is associated with increased cancer risk:  1. This variant may be a benign change that does not increase cancer risk.  2. This variant may be a harmful mutation that causes an increased risk for ovarian (and possibly) other cancer.    Harmful mutations in the RAD51D gene are associated with an increased risk for ovarian cancer and possibly breast and prostate cancer.  Studies have shown that the risk for ovarian cancer is around 7-12% (compared to 1-2% in the general population) for females with a RAD51D mutation. An association of harmful mutations in RAD51D with increased risk for breast and prostate cancers has been suggested in the scientific literature; however, data is still limited in this area.     Again, it is not known at this time if the variant seen in Janet is a harmful mutation or, instead, a benign variation of normal. The laboratory is working to determine if this variant is harmful or benign, and they will contact me if it is reclassified. If this variant is determined to be a benign change, there may be a different gene or combination of genes and environment that are associated with the cancers in this family.    Inheritance:  We reviewed the inheritance of this variant in the RAD51D gene. We discussed that if Janet chooses " to have any future biological children, there would be a 50% chance to pass on a copy of this variant to each of her children. Likewise, assuming that Janet inherited this RAD51D gene variant from one of her parents, it is possible that other relatives could have a copy of this variant as well. Because it is unclear what, if any, risk is associated with this variant, clinical genetic testing for this RAD51D variant alone is not recommended for relatives.    On the other hand, for the genes that Janet rested negative for, we would expect that she would not pass on a mutation in any of these genes to any future children. Mutations in these genes do not skip generations.      Screening:  Based on this inconclusive test result, it is important for Janet and her relatives to refer back to the family history for appropriate cancer screening:      Janet has previously met with a breast specialist (Candace Santos PA-C), who has determined that Janet meets National Comprehensive Cancer Network (NCCN) criteria for increased-risk breast cancer surveillance.  (See her note from 8- for details.)  I encouraged Janet to follow-up with Candace Santos PA-C, and her recommended screening, as previously discussed.      We talked about that women who have a close relative with ovarian cancer are at a somewhat increased risk for developing ovarian cancer as compared to women with no family history (even if there is not a proven genetic risk factor in the family); however, there are not specific screening recommendations currently for women with a close family history of ovarian cancer, as effective screening methodologies for ovarian cancer are still to be developed.  We discussed that some women with a close family member with ovarian cancer are interested in potential oophorectomy, and those individuals should discussed the potential risks and benefits of that procedure with their gynecologic  "provider.      Other population cancer screening options, such as those recommended by the American Cancer Society and the National Comprehensive Cancer Network (NCCN), are also appropriate for Janet and her family. These screening recommendations may change if there are changes to Janet's personal and/or family history of cancer. Final screening recommendations should be made by each individual's primary care provider.    Additional Testing Considerations:  It is possible Janet does carry a gene or combination of genes and environment that increase her  risk for cancer that was not identifiable through this particular genetic testing panel. Janet is encouraged to contact me in the future if she wants to know if there is additional genetic testing that might be available/indicated for her then or if she wishes to readdress larger gene panel options in the future.     Summary:  We do not have an explanation for Janet's family history of cancer. While no genetic changes were identified, Janet may still be at risk for certain cancers due to family history, environmental factors, or other genetic causes not identified by this test.  Because of that, it is important that she continue with cancer screening based on her personal and family history as discussed above.    Genetic testing is rapidly advancing, and new cancer susceptibility genes will most likely be identified in the future. Therefore, I encouraged Janet to contact me annually or if there are changes in her personal or family history. This may change how we assess her cancer risk, screening, and the testing we would offer.    Plan:  1.  I have released a copy of Janet's test results to her today through the [employer] portal.  She will also be routed a copy of this clinic note and an \"after visit summary\" (see patient instructions).    2.  Janet plans to follow-up with Candace Santos PA-C, as previously recommended regarding her " increased breast cancer screening.  She is also encouraged to work with her primary care provider and/or gynecologist regarding other cancer screening.  Janet should also speak with her gynecologist about potential benefits and risks of oophorectomy, if this is something that Janet is considering at a future date.    3.  Janet should contact me periodically, or sooner if her family history changes, and she would like to know if there are additional screening or testing recommendations at that time.    4.  I will contact Janet if the laboratory informs me that this VUS has been reclassified.  This may change screening and testing recommendations for Janet and her relatives.    If Janet has any further questions, I encouraged her to contact me at [phone] or via [place] or through email: [email or web address].    Time spent face to face via video: 10 minutes        Again, thank you for allowing me to participate in the care of your patient.      Sincerely,    Juana Ambrose, GC

## 2021-12-08 NOTE — PATIENT INSTRUCTIONS
Genetic Testing  You had a blood test that looked at the genetic information in one or more genes associated with increased cancer risk.  The testing looked for any harmful changes that would stop this particular gene from working like it should.  It is important to remember that everyone has variants in multiple genes in their bodies that do not affect how the gene works.  These changes are benign and do not cause disease or increase cancer risk.  The term often used to describe these variants is benign polymorphism.  If an individual is found to have a benign polymorphism, their genetic test result is reported as Negative.    Results  There are three possible results of any genetic test:    Positive--a harmful mutation was identified    Negative--no mutation was identified    Variant of unknown significance--a variation in one of the genes was identified, but it is unclear how this impacts cancer risk in the family    Variant of Unknown Significance (VUS)  A VUS in RAD51D was identified in your blood sample.  Scientists currently do not know if this variant is harmful or if it is a benign polymorphism not associated with any increased risk of cancer.   There are several reasons for this lack of knowledge, but likely your VUS has either never been seen before or has only been seen in a small number of individuals.  Until your VUS is studied in more detail and/or seen in more families, scientists are not able to predict if it is a harmful mutation or a benign polymorphism.  Therefore, the cause of the cancer in you and your relatives is still unknown.          Reclassification  Genetic testing laboratories and researchers are constantly working to determine the importance of variants of unknown significance.  Most laboratories will notify the genetic counselor when a patient s VUS has been reclassified as a harmful mutation or a benign polymorphism.      Some families may be candidates for participation in the  laboratory s variant research programs to help determine the importance of their VUS.  Only the testing laboratory can decide who is eligible for participation.  Participating in these programs does not guarantee that families will learn the significance of their VUS immediately.  It could be months or years before a VUS is reclassified.       Screening Recommendations  Cancer screening recommendations for patients with a VUS should be based on their personal and family history rather than the VUS itself.  This may include increased cancer screening for certain individuals in the family.  Your genetic counselor and health care provider can help make appropriate recommendations for you and your relatives.      It is usually not recommended that other relatives have genetic testing for the VUS unless it is done as part of the laboratory s variant research program because an individual s test results should not influence their cancer screening until we determine the importance of the VUS.  Your genetic counselor can help you and your relatives understand the risks and benefits of all genetic testing and cancer screening options.    Please call us if you have any questions or concerns.     Cancer Risk Management Program [phone] ([phone])  ? Anton Jacobs, MS, [facility] [phone]  X    Kiara Ambrose, MS, [facility]  [phone]    [email or web address]  ? Niurka Babb, MS, [facility]  [phone]  ? Angelina Chou, MS, [facility] [phone]  ? Yu Schaeffer, MS, [facility] [phone]  ? Vianney Kirk, MS, [facility]  [phone]  ? Mari Alvarado, MS  [phone]

## 2021-12-08 NOTE — PROGRESS NOTES
Cancer Risk Management Program Genetic Counseling Note    12/8/2021    Referring Provider: Dr. Leatha Robles    Presenting Information:  Janet had a return telephone visit though the Cancer Risk Management Program, in order to discuss her genetic testing results. Her blood was drawn on 11-3-2021.  A Common Hereditary Cancers genetic testing panel was requested from [facility]. This testing was done because of her family history of ovarian, breast, and other cancer.    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 This variant is called c.871C>T (also known as p.[id number]). No other variants or mutations were found in the RAD51D gene. Given the uncertain significance of this result, medical management decisions should NOT be made based on this test result alone.    Of note, Janet tested negative for mutations (or variant os unknown significance) in the following genes:  APC, MARK, AXIN2, BARD1, BMPR1A, BRCA1, BRCA2, BRIP1, CDH1, CDK4, CDKN2A, CHEK2, CTNNA1, DICER1, EPCAM, GREM1, HOXB13, KIT, MEN1, MLH1, MSH2, MSH3, MSH6, MUTYH, NBN, NF1, NTHL1, PALB2, PDGFRA, PMS2, POLD1, POLE, PTEN, RAD50, RAD51C, SDHA, SDHB, SDHC, SDHD, SMAD4, SMARCA4, STK11, TP53,TSC1, TSC2, and VHL.      Therefore, genetic testing did not detect an identifiable mutation associated with Hereditary Breast and Ovarian Cancer syndrome (BRCA1, BRCA2), Field syndrome (MLH1, MSH2, MSH6, PMS2, EPCAM), Familial Adenomatous Polyposis (APC), Hereditary Diffuse Gastric Cancer (CDH1), Familial Atypical Multiple Mole Melanoma syndrome (CDK4, CDKN2A), Juvenile Polyposis syndrome (BMPR1A, SMAD4), Cowden syndrome (PTEN), Li Fraumeni syndrome (TP53), Peutz-Jeghers syndrome (STK11), MUTYH Associated Polyposis (MUTYH), Hereditary Paraganglioma and Pheochromocytoma syndrome (SDHA, SDHB, SDHC, SDHD), Tuberous sclerosis complex (TSC1, TSC2), Von Hippel-Lindau  "disease (VHL), Neurofibromatosis type 1 (NF1), and Multiple Endocrine Neoplasia type 1 (MEN1).    A copy of the test report can be found in the Laboratory tab, dated 11-3-2021, and named \"LABORATORY MISCELLANEOUS ORDER.\" The report is scanned in as a linked document.    Interpretation:  We discussed several different interpretations of this inconclusive test result. It is not clear if this variant in the RAD51D gene is associated with increased cancer risk:  1. This variant may be a benign change that does not increase cancer risk.  2. This variant may be a harmful mutation that causes an increased risk for ovarian (and possibly) other cancer.    Harmful mutations in the RAD51D gene are associated with an increased risk for ovarian cancer and possibly breast and prostate cancer.  Studies have shown that the risk for ovarian cancer is around 7-12% (compared to 1-2% in the general population) for females with a RAD51D mutation. An association of harmful mutations in RAD51D with increased risk for breast and prostate cancers has been suggested in the scientific literature; however, data is still limited in this area.     Again, it is not known at this time if the variant seen in Janet is a harmful mutation or, instead, a benign variation of normal. The laboratory is working to determine if this variant is harmful or benign, and they will contact me if it is reclassified. If this variant is determined to be a benign change, there may be a different gene or combination of genes and environment that are associated with the cancers in this family.    Inheritance:  We reviewed the inheritance of this variant in the RAD51D gene. We discussed that if Janet chooses to have any future biological children, there would be a 50% chance to pass on a copy of this variant to each of her children. Likewise, assuming that Janet inherited this RAD51D gene variant from one of her parents, it is possible that other relatives " could have a copy of this variant as well. Because it is unclear what, if any, risk is associated with this variant, clinical genetic testing for this RAD51D variant alone is not recommended for relatives.    On the other hand, for the genes that Janet rested negative for, we would expect that she would not pass on a mutation in any of these genes to any future children. Mutations in these genes do not skip generations.      Screening:  Based on this inconclusive test result, it is important for Janet and her relatives to refer back to the family history for appropriate cancer screening:      Janet has previously met with a breast specialist (Candace Santos PA-C), who has determined that Janet meets National Comprehensive Cancer Network (NCCN) criteria for increased-risk breast cancer surveillance.  (See her note from 8- for details.)  I encouraged Janet to follow-up with Candace Santos PA-C, and her recommended screening, as previously discussed.      We talked about that women who have a close relative with ovarian cancer are at a somewhat increased risk for developing ovarian cancer as compared to women with no family history (even if there is not a proven genetic risk factor in the family); however, there are not specific screening recommendations currently for women with a close family history of ovarian cancer, as effective screening methodologies for ovarian cancer are still to be developed.  We discussed that some women with a close family member with ovarian cancer are interested in potential oophorectomy, and those individuals should discussed the potential risks and benefits of that procedure with their gynecologic provider.      Other population cancer screening options, such as those recommended by the American Cancer Society and the National Comprehensive Cancer Network (NCCN), are also appropriate for Janet and her family. These screening recommendations may change if there  "are changes to Janet's personal and/or family history of cancer. Final screening recommendations should be made by each individual's primary care provider.    Additional Testing Considerations:  It is possible Janet does carry a gene or combination of genes and environment that increase her  risk for cancer that was not identifiable through this particular genetic testing panel. Janet is encouraged to contact me in the future if she wants to know if there is additional genetic testing that might be available/indicated for her then or if she wishes to readdress larger gene panel options in the future.     Summary:  We do not have an explanation for Janet's family history of cancer. While no genetic changes were identified, Janet may still be at risk for certain cancers due to family history, environmental factors, or other genetic causes not identified by this test.  Because of that, it is important that she continue with cancer screening based on her personal and family history as discussed above.    Genetic testing is rapidly advancing, and new cancer susceptibility genes will most likely be identified in the future. Therefore, I encouraged Janet to contact me annually or if there are changes in her personal or family history. This may change how we assess her cancer risk, screening, and the testing we would offer.    Plan:  1.  I have released a copy of Janet's test results to her today through the [employer] portal.  She will also be routed a copy of this clinic note and an \"after visit summary\" (see patient instructions).    2.  Janet plans to follow-up with Candace Santos PA-C, as previously recommended regarding her increased breast cancer screening.  She is also encouraged to work with her primary care provider and/or gynecologist regarding other cancer screening.  Janet should also speak with her gynecologist about potential benefits and risks of oophorectomy, if this is something " that Janet is considering at a future date.    3.  Janet should contact me periodically, or sooner if her family history changes, and she would like to know if there are additional screening or testing recommendations at that time.    4.  I will contact Janet if the laboratory informs me that this VUS has been reclassified.  This may change screening and testing recommendations for Janet and her relatives.    If Janet has any further questions, I encouraged her to contact me at [phone] or via [place] [facility] or through email: [email or web address].    Kiara Ambrose MS, [facility]  Genetic Counselor    Time spent face to face via video: 10 minutes

## 2021-12-08 NOTE — LETTER
Cancer Risk Management  Program Locations    [facility]  [facility]  [facility]  [facility]  [facility]  Mailing Address  Cancer Risk Management Program  [address]  [place] [address]    New patient appointments  [phone]  January 22, 2022    Janet Ryder  [address]  [facility] [place] [address]      Dear Janet,    It was a good talking with you last month about your test results. Here is a copy of the progress note from your recent genetic counseling visit to the Cancer Risk Management Program; I apologize for the delay in getting this letter to you. If you have any additional questions, please feel free to contact me.    Cancer Risk Management Program Genetic Counseling Note    12/8/2021    Referring Provider: Dr. Leatha Robles    Presenting Information:  Janet had a return telephone visit though the Cancer Risk Management Program, in order to discuss her genetic testing results. Her blood was drawn on 11-3-2021.  A Common Hereditary Cancers genetic testing panel was requested from [facility]. This testing was done because of her family history of ovarian, breast, and other cancer.    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 This variant is called c.871C>T (also known as [id number]). No other variants or mutations were found in the RAD51D gene. Given the uncertain significance of this result, medical management decisions should NOT be made based on this test result alone.    Of note, Janet tested negative for mutations (or variant os unknown significance) in the following genes:  APC, MARK, AXIN2, BARD1, BMPR1A, BRCA1, BRCA2, BRIP1, CDH1, CDK4, CDKN2A, CHEK2, CTNNA1, DICER1, EPCAM, GREM1, HOXB13, KIT, MEN1, MLH1,  "MSH2, MSH3, MSH6, MUTYH, NBN, NF1, NTHL1, PALB2, PDGFRA, PMS2, POLD1, POLE, PTEN, RAD50, RAD51C, SDHA, SDHB, SDHC, SDHD, SMAD4, SMARCA4, STK11, TP53,TSC1, TSC2, and VHL.      Therefore, genetic testing did not detect an identifiable mutation associated with Hereditary Breast and Ovarian Cancer syndrome (BRCA1, BRCA2), Field syndrome (MLH1, MSH2, MSH6, PMS2, EPCAM), Familial Adenomatous Polyposis (APC), Hereditary Diffuse Gastric Cancer (CDH1), Familial Atypical Multiple Mole Melanoma syndrome (CDK4, CDKN2A), Juvenile Polyposis syndrome (BMPR1A, SMAD4), Cowden syndrome (PTEN), Li Fraumeni syndrome (TP53), Peutz-Jeghers syndrome (STK11), MUTYH Associated Polyposis (MUTYH), Hereditary Paraganglioma and Pheochromocytoma syndrome (SDHA, SDHB, SDHC, SDHD), Tuberous sclerosis complex (TSC1, TSC2), Von Hippel-Lindau disease (VHL), Neurofibromatosis type 1 (NF1), and Multiple Endocrine Neoplasia type 1 (MEN1).    A copy of the test report can be found in the Laboratory tab, dated 11-3-2021, and named \"LABORATORY MISCELLANEOUS ORDER.\" The report is scanned in as a linked document.    Interpretation:  We discussed several different interpretations of this inconclusive test result. It is not clear if this variant in the RAD51D gene is associated with increased cancer risk:  1. This variant may be a benign change that does not increase cancer risk.  2. This variant may be a harmful mutation that causes an increased risk for ovarian (and possibly) other cancer.    Harmful mutations in the RAD51D gene are associated with an increased risk for ovarian cancer and possibly breast and prostate cancer.  Studies have shown that the risk for ovarian cancer is around 7-12% (compared to 1-2% in the general population) for females with a RAD51D mutation. An association of harmful mutations in RAD51D with increased risk for breast and prostate cancers has been suggested in the scientific literature; however, data is still limited in this " area.     Again, it is not known at this time if the variant seen in Janet is a harmful mutation or, instead, a benign variation of normal. The laboratory is working to determine if this variant is harmful or benign, and they will contact me if it is reclassified. If this variant is determined to be a benign change, there may be a different gene or combination of genes and environment that are associated with the cancers in this family.    Inheritance:  We reviewed the inheritance of this variant in the RAD51D gene. We discussed that if Janet chooses to have any future biological children, there would be a 50% chance to pass on a copy of this variant to each of her children. Likewise, assuming that Janet inherited this RAD51D gene variant from one of her parents, it is possible that other relatives could have a copy of this variant as well. Because it is unclear what, if any, risk is associated with this variant, clinical genetic testing for this RAD51D variant alone is not recommended for relatives.    On the other hand, for the genes that Janet rested negative for, we would expect that she would not pass on a mutation in any of these genes to any future children. Mutations in these genes do not skip generations.      Screening:  Based on this inconclusive test result, it is important for Janet and her relatives to refer back to the family history for appropriate cancer screening:      Janet has previously met with a breast specialist (Candace Santos PA-C), who has determined that Janet meets National Comprehensive Cancer Network (NCCN) criteria for increased-risk breast cancer surveillance.  (See her note from 8- for details.)  I encouraged Janet to follow-up with Candace Santos PA-C, and her recommended screening, as previously discussed.      We talked about that women who have a close relative with ovarian cancer are at a somewhat increased risk for developing ovarian cancer as  compared to women with no family history (even if there is not a proven genetic risk factor in the family); however, there are not specific screening recommendations currently for women with a close family history of ovarian cancer, as effective screening methodologies for ovarian cancer are still to be developed.  We discussed that some women with a close family member with ovarian cancer are interested in potential oophorectomy, and those individuals should discussed the potential risks and benefits of that procedure with their gynecologic provider.      Other population cancer screening options, such as those recommended by the American Cancer Society and the National Comprehensive Cancer Network (NCCN), are also appropriate for Janet and her family. These screening recommendations may change if there are changes to Janet's personal and/or family history of cancer. Final screening recommendations should be made by each individual's primary care provider.    Additional Testing Considerations:  It is possible Janet does carry a gene or combination of genes and environment that increase her  risk for cancer that was not identifiable through this particular genetic testing panel. Janet is encouraged to contact me in the future if she wants to know if there is additional genetic testing that might be available/indicated for her then or if she wishes to readdress larger gene panel options in the future.     Summary:  We do not have an explanation for Janet's family history of cancer. While no genetic changes were identified, Janet may still be at risk for certain cancers due to family history, environmental factors, or other genetic causes not identified by this test.  Because of that, it is important that she continue with cancer screening based on her personal and family history as discussed above.    Genetic testing is rapidly advancing, and new cancer susceptibility genes will most likely be  "identified in the future. Therefore, I encouraged Janet to contact me annually or if there are changes in her personal or family history. This may change how we assess her cancer risk, screening, and the testing we would offer.    Plan:  1.  I have released a copy of Janet's test results to her today through the [facility] portal.  She will also be routed a copy of this clinic note and an \"after visit summary\" (see patient instructions).    2.  Janet plans to follow-up with Candace Santos PA-C, as previously recommended regarding her increased breast cancer screening.  She is also encouraged to work with her primary care provider and/or gynecologist regarding other cancer screening.  Janet should also speak with her gynecologist about potential benefits and risks of oophorectomy, if this is something that Janet is considering at a future date.    3.  Janet should contact me periodically, or sooner if her family history changes, and she would like to know if there are additional screening or testing recommendations at that time.    4.  I will contact Janet if the laboratory informs me that this VUS has been reclassified.  This may change screening and testing recommendations for Janet and her relatives.    If Janet has any further questions, I encouraged her to contact me at [phone] or via [employer] or through email: [email or web address].    Kiara Ambrose MS, [facility]  Genetic Counselor    Time spent face to face via video: 10 minutes      "

## 2022-01-12 ENCOUNTER — VIRTUAL VISIT (OUTPATIENT)
Dept: SLEEP MEDICINE | Facility: CLINIC | Age: 42
End: 2022-01-12
Attending: FAMILY MEDICINE
Payer: COMMERCIAL

## 2022-01-12 VITALS — WEIGHT: 185 LBS | BODY MASS INDEX: 29.03 KG/M2 | HEIGHT: 67 IN

## 2022-01-12 DIAGNOSIS — G47.61 PERIODIC LIMB MOVEMENT: ICD-10-CM

## 2022-01-12 DIAGNOSIS — R51.9 SLEEP RELATED HEADACHES: ICD-10-CM

## 2022-01-12 DIAGNOSIS — R53.83 FATIGUE, UNSPECIFIED TYPE: Primary | ICD-10-CM

## 2022-01-12 DIAGNOSIS — G47.63 SLEEP RELATED BRUXISM: ICD-10-CM

## 2022-01-12 PROCEDURE — 99203 OFFICE O/P NEW LOW 30 MIN: CPT | Mod: 95 | Performed by: INTERNAL MEDICINE

## 2022-01-12 ASSESSMENT — ENCOUNTER SYMPTOMS
MEMORY LOSS: 1
DEPRESSION: 1
SEIZURES: 0
PARALYSIS: 0
SPEECH CHANGE: 0
NERVOUS/ANXIOUS: 1
LOSS OF CONSCIOUSNESS: 0
DOUBLE VISION: 0
NUMBNESS: 0
EYE WATERING: 0
EYE PAIN: 0
INSOMNIA: 1
DIZZINESS: 0
EYE IRRITATION: 1
DISTURBANCES IN COORDINATION: 0
DECREASED CONCENTRATION: 1
TREMORS: 0
PANIC: 0
BREAST MASS: 1
EYE REDNESS: 1
BREAST PAIN: 0
HEADACHES: 1
WEAKNESS: 0
TINGLING: 0

## 2022-01-12 ASSESSMENT — MIFFLIN-ST. JEOR: SCORE: 1546.78

## 2022-01-12 ASSESSMENT — SLEEP AND FATIGUE QUESTIONNAIRES

## 2022-01-12 NOTE — PROGRESS NOTES
Immanuel is a 41 year old who is being evaluated via a billable video visit.      How would you like to obtain your AVS? MyChart  If the video visit is dropped, the invitation should be resent by: Text to cell phone: [phone]  Will anyone else be joining your video visit? No    Video Start Time: 11:30 AM  Video-Visit Details    Type of service:  Video Visit    Video End Time:11:47 AM    Originating Location (pt. Location): Home    Distant Location (provider location):  [facility]     Platform used for Video Visit: [employer]     Answers for HPI/ROS submitted by the patient on 1/12/2022  General Symptoms: No  Skin Symptoms: No  HENT Symptoms: No  EYE SYMPTOMS: Yes  HEART SYMPTOMS: No  LUNG SYMPTOMS: No  INTESTINAL SYMPTOMS: No  URINARY SYMPTOMS: No  GYNECOLOGIC SYMPTOMS: No  BREAST SYMPTOMS: Yes  SKELETAL SYMPTOMS: No  BLOOD SYMPTOMS: No  NERVOUS SYSTEM SYMPTOMS: Yes  MENTAL HEALTH SYMPTOMS: Yes  Eye pain: No  Vision loss: No  Dry eyes: Yes  Watery eyes: No  Eye bulging: No  Double vision: No  Flashing of lights: No  Spots: No  Floaters: Yes  Redness: Yes  Crossed eyes: No  Tunnel Vision: No  Yellowing of eyes: No  Eye irritation: Yes  Discharge: No  Lumps: Yes  Pain: No  Nipple retraction: No  Trouble with coordination: No  Dizziness or trouble with balance: No  Fainting or black-out spells: No  Memory loss: Yes  Headache: Yes  Seizures: No  Speech problems: No  Tingling: No  Tremor: No  Weakness: No  Difficulty walking: No  Paralysis: No  Numbness: No  Nervous or Anxious: Yes  Depression: Yes  Trouble sleeping: Yes  Trouble thinking or concentrating: Yes  Mood changes: No  Panic attacks: No    Additional 15 minutes on the date of service was spent performing the following:    -Preparing to see the patient  -Obtaining and/or reviewing separately obtained history   -Ordering medications, tests, or procedures   -Documenting clinical information in the electronic or other health record      Thank you for the opportunity to participate in the care of  Janet Ryder.    Assessment and Plan:    In summary Janet Ryder is a 41 year old year old female here for sleep disturbance.  1. Chronic fatigue/Sleep related headaches/Sleep related bruxism/Periodic limb movement in sleep   Janet Ryder has high risk for obstructive sleep apnea based on the history chronic fatigue, sleep related headaches and a crowded airway. I educated the patient on the underlying pathophysiology of obstructive sleep apnea. We reviewed the risks associated with sleep apnea, including increased cardiovascular risk and overall death. We talked about treatments briefly. I recommend getting an baseline nocturnal polysomnography. The patient should return to the clinic to discuss results and treatment option in a patient-centered approach.    History of present illness:    She is a 41 year old female who comes to the virtual clinic with a chief complaint of fatigue that is been going on for more than a year.  She states that despite adequate hours of sleep she would still feel tired upon awakening.  While the patient denies any episodes of witnessed apnea or snoring she states that she would frequently wake up with headaches.  She does admit to clenching her teeth during sleep.  She believes that she also has been told that she does have limb movements during sleep.     Ideal Sleep-Wake Cycle(devoid of societal pressure):    Patient would try to initiate sleep at around Midnight with a sleep latency of variable amount of time. The patient would have 2 awakenings. Final wake up time is around 8-9 AM.    ANGELO:  ANGELO Total Score: 19  Total score - [place]: 1 (1/12/2022 11:10 AM)    Patient told to return in one week after the sleep study is interpreted.    Patient Active Problem List   Diagnosis     [facility] SPRAIN SACROILIAC     [place] [facility]     Lumbosacral ligament sprain     [facility] OTHER BACK SYMPTOMS     Contraception      CARDIOVASCULAR SCREENING; LDL GOAL LESS THAN 160     Abdominal pain     Seasonal allergies     Atypical migraine     Rosacea     Iron deficiency     Interstitial cystitis     Family history of malignant neoplasm of ovary     Urgency-frequency syndrome     Dyspareunia in female     Osteoarthritis resulting from right hip dysplasia     Tear of right acetabular labrum, initial encounter     Current moderate episode of major depressive disorder without prior episode (H)     Past Medical History:   Diagnosis Date     Atypical migraine      Past Surgical History:   Procedure Laterality Date     ORTHOPEDIC SURGERY       [facility] NONSPECIFIC PROCEDURE  AGE 6, AGE 10    REMOVAL OF FACIAL SKIN CYSTS     [facility] NONSPECIFIC PROCEDURE  AGE 22    SUTURES FOR FACIAL LACERATION     Current Outpatient Medications   Medication Sig Dispense Refill     buPROPion (WELLBUTRIN) 75 MG tablet Take 2 in the am and 1 in the pm 270 tablet 3     Cholecalciferol (VITAMIN D PO) Take 5,000 Units by mouth.       Multiple Vitamin (DAILY MULTIVITAMIN PO) Take  by mouth.       Shellfish-derived products, Amoxicillin, Erythromycin, No clinical screening - see comments, and Scopolamine  Social History     Socioeconomic History     Marital status:      Spouse name: Not on file     Number of children: Not on file     Years of education: Not on file     Highest education level: Not on file   Occupational History     Employer: NONE      Comment: [employer]   Tobacco Use     Smoking status: Never Smoker     Smokeless tobacco: Never Used   Substance and Sexual Activity     Alcohol use: Yes     Comment: 3 or 4 drinks per week     Drug use: No     Sexual activity: Yes     Partners: Male     Birth control/protection: None   Other Topics Concern     Parent/sibling w/ CABG, MI or angioplasty before 65F 55M? No   Social History Narrative    Caffeine intake/servings daily - 1-2    Calcium intake/servings daily - 2+    Exercise 3 times weekly - describe swim,  wts, basketball    Sunscreen used - Yes    Seatbelts used - Yes    Guns stored in the home - No    Self Breast Exam - No    Pap test up to date -  Yes    Eye exam up to date -  Yes    Dental exam up to date -  Yes    DEXA scan up to date -  Not Applicable    Flex Sig/Colonoscopy up to date -  Not Applicable    Mammography up to date -  Not Applicable    Immunizations reviewed and up to date - Yes    Abuse: Current or Past (Physical, Sexual or Emotional) - No    Do you feel safe in your environment - Yes    Do you cope well with stress - No    Do you suffer from insomnia - Yes    Last updated by: CARLOS CROSS  5/11/2011                     Social Determinants of Health     Financial Resource Strain: Not on file   Food Insecurity: Not on file   Transportation Needs: Not on file   Physical Activity: Not on file   Stress: Not on file   Social Connections: Not on file   Intimate Partner Violence: Not on file   Housing Stability: Not on file     Family History   Problem Relation Age of Onset     Hypertension Mother      Lung Cancer Mother      Ovarian Cancer Mother      Alcohol/Drug Father      Gastrointestinal Disease Maternal Grandmother         cerosis of liver     Cancer - colorectal Maternal Grandfather         age 75     Cancer Maternal Grandfather         melanoma     Respiratory Maternal Grandfather      Glaucoma Maternal Grandfather      Cerebrovascular Disease Paternal Grandfather      Aneurysm Maternal Uncle         brain        Physical Exam:  GEN: NAD,   Head: Normocephalic.  EYES: EOMI  ENT: Oropharynx is clear, Jaramillo class 4+ airway.   Psych: normal mood, normal affect  Snore test:(+)     Labs/Studies:     No results found for: PH, PHARTERIAL, PO2, [id number], SAT, PCO2, HCO3, BASEEXCESS, LUÍS, BEB  Lab Results   Component Value Date    TSH 1.82 11/03/2021    TSH 2.04 09/04/2018     Lab Results   Component Value Date    GLC 88 11/03/2021     (H) 04/30/2015     Lab Results   Component Value  Date    HGB 13.0 11/03/2021    HGB 12.2 04/30/2020     Lab Results   Component Value Date    BUN 10 11/03/2021    BUN 13 04/30/2015    CR 0.78 11/03/2021    CR 0.65 04/30/2015     Lab Results   Component Value Date    AST 10 11/03/2021    AST 7 04/30/2015    ALT 16 11/03/2021    ALT 19 04/30/2015    ALKPHOS 70 11/03/2021    ALKPHOS 59 04/30/2015    BILITOTAL 0.7 11/03/2021    BILITOTAL 0.3 04/30/2015     No results found for: UAMP, UBARB, BENZODIAZEUR, UCANN, UCOC, OPIT, UPCP    Recent Labs   Lab Test 11/03/21  1015 04/30/15  1731    139   POTASSIUM 4.8 3.7   CHLORIDE 104 104   CO2 27 27   ANIONGAP 5 8   GLC 88 117*   BUN 10 13   CR 0.78 0.65   FABRICE 9.1 9.3       Ferritin   Date Value Ref Range Status   11/03/2021 26 12 - 150 ng/mL Final   04/30/2015 52 12 - 150 ng/mL Final       Terry Trinidad DO  Board Certified in Internal Medicine and Sleep Medicine    (Note created with Dragon voice recognition and unintended spelling errors and word substitutions may occur)

## 2022-01-12 NOTE — PATIENT INSTRUCTIONS
Your BMI is Body mass index is 28.98 kg/m .    What is BMI?  Body mass index (BMI) is one way to tell whether you are at a healthy weight, overweight, or obese. It measures your weight in relation to your height.  A BMI of 18.5 to 24.9 is in the healthy range. A person with a BMI of 25 to 29.9 is considered overweight, and someone with a BMI of 30 or greater is considered obese.  Another way to find out if you are at risk for health problems caused by overweight and obesity is to measure your waist. If you are a woman and your waist is more than 35 inches, or if you are a man and your waist is more than 40 inches, your risk of disease may be higher.  More than two-thirds of American adults are considered overweight or obese. Being overweight or obese increases the risk for further weight gain.  Excess weight may lead to heart disease and diabetes. Creating and following plans for healthy eating and physical activity may help you improve your health.    Methods for maintaining or losing weight.  Weight control is part of healthy lifestyle and includes exercise, emotional health, and healthy eating habits.  Careful eating habits lifelong is the mainstay of weight control.  Though there are significant health benefits from weight loss, long-term weight loss with diet alone may be very difficult to achieve- studies show long-term success with dietary management in less than 10% of people. Attaining a healthy weight may be especially difficult to achieve in those with severe obesity. In some cases, medications, devices and surgical management might be considered.    What can you do?  If you are overweight or obese and are interested in methods for weight loss, you should discuss this with your provider. In addition, we recommend that you review healthy life styles and methods for weight loss available through the National Institutes of Health patient information sites:    http://win.niddk.nih.gov/publications/index.htm    Patient education: What is a sleep study?     What is a sleep study? -- A sleep study is a test that measures how well you sleep and checks for sleep problems. For some sleep studies, you stay overnight in a sleep lab at a hospital or sleep center.     What happens during a sleep study? -- Before you go to sleep, a technician attaches small, sticky patches called  electrodes  to your head, chest, and legs. He or she will also place a small tube beneath your nose and might wrap 1 or 2 belts around your chest.   Each of these items has wires that connect to monitors. The monitors record your movement, brain activity, breathing, and other body functions while you sleep.  If you have a history of trouble falling asleep, your doctor might prescribe a medicine to help you fall asleep in the lab. If you have never taken the medicine before, your doctor might ask you take it on a night before your sleep study to see how it affects you.   Why might my doctor order a sleep study? -- Your doctor will order a sleep study if he or she thinks you have sleep apnea or a different condition that makes you:   ?Have sudden jerking leg movements while you sleep, called  periodic limb movements.    ?Feel very sleepy during the day and fall asleep all of a sudden, called  narcolepsy.    ?Have trouble falling asleep or staying asleep over a long period of time, called  chronic insomnia.    ?Do odd things while you sleep, such as walking.  How should I prepare for a sleep study? -- On the day of your sleep study, you should:   ?Avoid alcohol   ?Avoid drinking coffee, tea, sodas, and other drinks that have caffeine in the afternoon and evening   ?Take all of your regular medicines     The cost of care estimate line is [phone]. They are able to give the patient an estimate of the charges and also an estimate of their insurance coverage/patient responsibility.   After your sleep study  is performed, please call us at [phone] or [phone]  to schedule for a follow up to review the results of the sleep study.    Please bring one tab of low dose melatonin 3 mg or less to the night of the study.    Melatonin intake is completely voluntary.    You may take own melatonin after arrival to sleep center. Do not drive or operate machinery after intake of melatonin.

## 2022-01-24 PROBLEM — Z80.41 FAMILY HISTORY OF MALIGNANT NEOPLASM OF OVARY: Status: ACTIVE | Noted: 2018-05-14

## 2022-01-24 PROBLEM — Z80.3 FAMILY HISTORY OF MALIGNANT NEOPLASM OF BREAST: Status: ACTIVE | Noted: 2022-01-24

## 2022-03-28 ENCOUNTER — TELEPHONE (OUTPATIENT)
Dept: SLEEP MEDICINE | Facility: CLINIC | Age: 42
End: 2022-03-28
Payer: COMMERCIAL

## 2022-03-28 NOTE — TELEPHONE ENCOUNTER
Reason for Call:  Other call back    Detailed comments: patients sleep study order is cancelled, can we get new order    Phone Number Patient can be reached at: Home number on file [phone] (home)    Best Time: anytime    Can we leave a detailed message on this number? YES    Call taken on 3/28/2022 at 5:48 PM by Peri Iverson

## 2022-05-17 ENCOUNTER — VIRTUAL VISIT (OUTPATIENT)
Dept: PEDIATRICS | Facility: CLINIC | Age: 42
End: 2022-05-17
Payer: COMMERCIAL

## 2022-05-17 DIAGNOSIS — E66.3 OVERWEIGHT (BMI 25.0-29.9): ICD-10-CM

## 2022-05-17 DIAGNOSIS — F32.1 CURRENT MODERATE EPISODE OF MAJOR DEPRESSIVE DISORDER WITHOUT PRIOR EPISODE (H): ICD-10-CM

## 2022-05-17 DIAGNOSIS — U07.1 INFECTION DUE TO 2019 NOVEL CORONAVIRUS: Primary | ICD-10-CM

## 2022-05-17 DIAGNOSIS — F33.41 RECURRENT MAJOR DEPRESSIVE DISORDER, IN PARTIAL REMISSION (H): ICD-10-CM

## 2022-05-17 PROCEDURE — 99214 OFFICE O/P EST MOD 30 MIN: CPT | Mod: 95 | Performed by: NURSE PRACTITIONER

## 2022-05-17 RX ORDER — BUPROPION HYDROCHLORIDE 75 MG/1
TABLET ORAL
Qty: 270 TABLET | Refills: 3 | COMMUNITY
Start: 2022-05-17 | End: 2023-05-23

## 2022-05-17 RX ORDER — FERROUS SULFATE 325(65) MG
325 TABLET ORAL
COMMUNITY

## 2022-05-17 NOTE — PROGRESS NOTES
Immanuel is a 42 year old who is being evaluated via a billable telephone visit.      What phone number would you like to be contacted at? [phone]  How would you like to obtain your AVS? MyChart    Assessment & Plan     Infection due to 2019 novel coronavirus  Home O2 sats stable. No hx of asthma. We discussed s/s that would warrant higher level of care.  Begin antivirals. We discussed common s/e. No hx of kidney or liver disease.  - nirmatrelvir and ritonavir (PAXLOVID) therapy pack; Take 3 tablets by mouth 2 times daily for 5 days Take 2 Nirmatrelvir tablets and 1 Ritonavir tablet twice daily for 5 days.    Current moderate episode of major depressive disorder without prior episode (H)  Overweight (BMI 25.0-29.9)  Recurrent major depressive disorder, in partial remission (H)  High risk conditions that qualify for antivirals. Drug-to-drug interaction with paxlovid and wellbutrin noted but no dose modifications needed.  - buPROPion (WELLBUTRIN) 75 MG tablet; Take 2 in the am and 4 in the pm      Patient Instructions     Instructions for Patients      What are the symptoms of COVID-19?  Symptoms can include fever, cough, shortness of breath, chills, headache, muscle pain sore throat, fatigue, runny or stuffy nose, and loss of taste and smell. Other less common symptoms include nausea, vomiting, or diarrhea (watery stools).    Know when to call 911. Emergency warning signs include:    Trouble breathing or shortness of breath    Pain or pressure in the chest that doesn't go away    Feeling confused like you haven't felt before, or not being able to wake up    Bluish-colored lips or face    How can I take care of myself?  1. Get lots of rest. Drink extra fluids (unless a doctor has told you not to).  2. Take Tylenol (acetaminophen) for fever or pain. If you have liver or kidney problems, ask your family doctor if it's okay to take Tylenol   Adults:   650 mg (two 325 mg pills or tablets) every 4 to 6 hours,  or...   1,000 mg (two 500 mg pills or tablets) every 8 hours as needed.  Note: Don't take more than 3,000 mg in one day. Acetaminophen is found in many medicines (both prescribed and over the counter). Read all labels to be sure you don't take too much.  For children, check the Tylenol bottle for the right dose. The dose is based on the child's age or weight.  3. Take over the counter medicines for your symptoms as needed. Talk to your pharmacist.  4. If you have other health problems (like cancer, heart failure, an organ transplant, or severe kidney disease): Call your specialty clinic if you don't feel better in the next 2 days.    These guidelines are for isolating and quarantining before returning to work, school or .     For employers, schools and day cares: This is an official notice for this person s medical guidelines for returning in-person.     For health care sites: The CDC gives different isolation and quarantine guidelines for healthcare sites, please check with these sites before arriving.     How do I self-isolate?  You isolate when you have symptoms of COVID or a test shows you have COVID, even if you don t have symptoms.     If you DO have symptoms:  o Stay home and away from others  - For at least 5 days after your symptoms started, AND   - You are fever free for 24 hours (with no medicine that reduces fever), AND  - Your other symptoms are better.  o Wear a mask for 10 full days any time you are around others.    If you DON T have symptoms:  o Stay at home and away from others for at least 5 days after your positive test.  o Wear a mask for 10 full days any time you are around others.    How and when do I quarantine?  You quarantine when you may have been exposed to the virus and DON T have symptoms.     Stay home and away from others.     You must quarantine for 5 days after your last contact with a person who has COVID.  o Note: If you are fully vaccinated, you don t need to quarantine.  You should still follow the steps below.     Wear a mask for 10 full days any time you re around others.    Get tested at least 5 days after you were exposed, even if you don t have symptoms.     If you start to have symptoms, isolate right away and get tested.    Where can I get more information?    [facility] COVID-19 Resource Hub: www.[employer]/covid19/     CDC Quarantine & Isolation: https://www.cdc.gov/coronavirus/2019-ncov/your-health/quarantine-isolation.html     CDC - What to Do If You're Sick: https://www.cdc.gov/coronavirus/2019-ncov/if-you-are-sick/index.html    [facility] clinical trials (COVID-19 research studies): clinicalaffairs.[facility]/umn-clinical-trials    [place] Department of Health COVID-19 Public Hotline: 1-838.467.5074      Return if symptoms worsen or fail to improve.    Joanna Walton NP  [facility] HARRY Gambino is a 42 year old who presents for the following health issues     HPI   How are you feeling today? Worse  In the past 24 hours have you had shortness of breath when speaking, walking, or climbing stairs? My breathing issues have worsened  Do you have a cough? Yes, I have a severe cough   When is the last time you had a fever greater than 100? YES - 100.8 yesterday 99.9F today  Are you having any other symptoms? Fatigue, Body aches and Headaches     Symptom onset 5/15  Home test positive 5/16   Had illness in 2020- thinks was COVID and had weeks of breathing difficulties with this.  Dyspnea on exertion, slight. Not as bad as before.  No hx of asthma.  No wheeze.  Home O2 around 92% with activity, otherwise 95%    Review of Systems   Constitutional, HEENT, cardiovascular, pulmonary, gi and gu systems are negative, except as otherwise noted.      Objective           Vitals:  No vitals were obtained today due to virtual visit.    Physical Exam   healthy, alert and no distress  PSYCH: Alert and oriented times 3; coherent  speech, normal   rate and volume, able to articulate logical thoughts, able   to abstract reason, no tangential thoughts, no hallucinations   or delusions  Her affect is normal  RESP: No cough, no audible wheezing, able to talk in full sentences  Remainder of exam unable to be completed due to telephone visits          Phone call duration: 10 minutes

## 2022-05-17 NOTE — PATIENT INSTRUCTIONS

## 2022-06-01 ENCOUNTER — TELEPHONE (OUTPATIENT)
Dept: DERMATOLOGY | Facility: CLINIC | Age: 42
End: 2022-06-01
Payer: COMMERCIAL

## 2022-06-06 ENCOUNTER — OFFICE VISIT (OUTPATIENT)
Dept: DERMATOLOGY | Facility: CLINIC | Age: 42
End: 2022-06-06
Payer: COMMERCIAL

## 2022-06-06 DIAGNOSIS — L71.9 ACNE ROSACEA: ICD-10-CM

## 2022-06-06 DIAGNOSIS — L82.1 SEBORRHEIC KERATOSES: ICD-10-CM

## 2022-06-06 DIAGNOSIS — L81.4 LENTIGINES: ICD-10-CM

## 2022-06-06 DIAGNOSIS — D18.01 CHERRY ANGIOMA: ICD-10-CM

## 2022-06-06 DIAGNOSIS — Z12.83 SKIN CANCER SCREENING: Primary | ICD-10-CM

## 2022-06-06 DIAGNOSIS — D22.9 MULTIPLE PIGMENTED NEVI: ICD-10-CM

## 2022-06-06 DIAGNOSIS — B35.1 ONYCHOMYCOSIS: ICD-10-CM

## 2022-06-06 PROCEDURE — 99204 OFFICE O/P NEW MOD 45 MIN: CPT | Performed by: PHYSICIAN ASSISTANT

## 2022-06-06 RX ORDER — CICLOPIROX 80 MG/ML
SOLUTION TOPICAL
Qty: 6 ML | Refills: 11 | Status: SHIPPED | OUTPATIENT
Start: 2022-06-06 | End: 2022-11-30

## 2022-06-06 RX ORDER — CICLOPIROX 80 MG/ML
SOLUTION TOPICAL
COMMUNITY
Start: 2022-05-25 | End: 2022-06-06

## 2022-06-06 ASSESSMENT — PAIN SCALES - GENERAL: PAINLEVEL: NO PAIN (0)

## 2022-06-06 NOTE — PATIENT INSTRUCTIONS
"Overview    Seborrheic keratoses are common benign growths of unknown cause seen in adults   due to a thickening of an area of the top skin layer.    Who's At Risk  Although they can occur anytime after puberty, almost everyone over 50 has one or more of these and they increase in number with age. Some families have an inherited tendency to grow multiple lesions. Men and women are equally as likely to develop seborrheic keratoses. Dark-skinned people are less affected than those with light skin; a variant seen in blacks is called dermatosis papulosa nigra.    Signs & Symptoms  One or more spots can occur anywhere on the body, except for palms, soles, and mucous membranes (eg, in the mouth or rectum). They do not go away. They do not turn into cancers, but some cancers resemble seborrheic keratosis.    They start as light brown to skin-colored, flat areas, which are round to oval and of varying size (usually less than a half inch, but sometimes much larger). As they grow thicker and rise above the skin surface, seborrheic keratoses may become dark brown to almost black with a \"stuck on\" appearance. The surface may feel smooth or rough.    Self-Care Guidelines  No treatment is needed unless there is irritation from clothing with itching or bleeding.  There is no way to prevent new spots from forming.  Some lotions with alpha hydroxyl acids may make the areas feel smoother with regular use but will not eliminate them.  OTC freezing techniques are available but usually not effective.    When to Seek Medical Care  If a spot on the skin is growing, bleeding, painful, or itchy, see your doctor.    Spots can be removed if you don't want them, but removal is considered a cosmetic issue and is usually not covered by insurance.    Treatments Your Physician May Prescribe  Removal can be accomplished with freezing (cryosurgery), scraping (curettage), burning (electrocautery), lasers, or with acids. The doctor might conduct a " biopsy if the growth looks unusual.    References:  Zohaib Mark, ed. Dermatology, pp.6115-8557. [place]: [employer] 2003.    Carlyle Hobbs, clifton. Patrick's Dermatology in General Medicine. 6th ed, pp.767-770. [facility]: [facility], 2003.      Patient Education     Checking for Skin Cancer  You can find cancer early by checking your skin each month. There are 3 kinds of skin cancer. They are melanoma, basal cell carcinoma, and squamous cell carcinoma. Doing monthly skin checks is the best way to find new marks or skin changes. Follow the instructions below for checking your skin.   The ABCDEs of checking moles for melanoma   Check your moles or growths for signs of melanoma using ABCDE:   Asymmetry: the sides of the mole or growth don t match  Border: the edges are ragged, notched, or blurred  Color: the color within the mole or growth varies  Diameter: the mole or growth is larger than 6 mm (size of a pencil eraser)  Evolving: the size, shape, or color of the mole or growth is changing (evolving is not shown in the images below)    Checking for other types of skin cancer  Basal cell carcinoma or squamous cell carcinoma have symptoms such as:     A spot or mole that looks different from all other marks on your skin  Changes in how an area feels, such as itching, tenderness, or pain  Changes in the skin's surface, such as oozing, bleeding, or scaliness  A sore that does not heal  New swelling or redness beyond the border of a mole    Who s at risk?  Anyone can get skin cancer. But you are at greater risk if you have:   Fair skin, light-colored hair, or light-colored eyes  Many moles or abnormal moles on your skin  A history of sunburns from sunlight or tanning beds  A family history of skin cancer  A history of exposure to radiation or chemicals  A weakened immune system  If you have had skin cancer in the past, you are at risk for recurring skin cancer.   How to check your skin  Do your monthly skin  checkups in front of a full-length mirror. Check all parts of your body, including your:   Head (ears, face, neck, and scalp)  Torso (front, back, and sides)  Arms (tops, undersides, upper, and lower armpits)  Hands (palms, backs, and fingers, including under the nails)  Buttocks and genitals  Legs (front, back, and sides)  Feet (tops, soles, toes, including under the nails, and between toes)  If you have a lot of moles, take digital photos of them each month. Make sure to take photos both up close and from a distance. These can help you see if any moles change over time.   Most skin changes are not cancer. But if you see any changes in your skin, call your doctor right away. Only he or she can diagnose a problem. If you have skin cancer, seeing your doctor can be the first step toward getting the treatment that could save your life.   [employer] last reviewed this educational content on 4/1/2019 2000-2020 [employer]. [address] [place] [address]. All rights reserved. This information is not intended as a substitute for professional medical care. Always follow your healthcare professional's instructions.       When should I call my doctor?  If you are worsening or not improving, please, contact us or seek urgent care as noted below.     Who should I call with questions (adults)?  [facility] (adult and pediatric): [phone]  [facility] (adult): [phone]  For urgent needs outside of business hours call the [facility] at [phone] and ask for the dermatology resident on call to be paged  If this is a medical emergency and you are unable to reach an ER, Call [phone]    Who should I call with questions (pediatric)?  [facility]- Pediatric Dermatology  Dr. Sera Tompkins, Dr. Lamar Fairchild, Dr. Willa Sanabria, Perri Valdovinos, PA, Dr. Ami Keenan, Dr. Janell Mason & Dr. De La Vega  Nick  Non-urgent nurse triage line; [phone]- Radhika and Areli RN Care Coordinators   Joi (/Complex ) [phone]    If you need a prescription refill, please contact your pharmacy. Refills are approved or denied by our Physicians during normal business hours, Monday through Fridays  Per office policy, refills will not be granted if you have not been seen within the past year (or sooner depending on your child's condition)    Scheduling Information:  Pediatric Appointment Scheduling and Call Center [phone]  Radiology Scheduling- [phone]  Sedation Unit Scheduling- [phone]  [place] Scheduling- General [phone]; Pediatric Dermatology [phone]  Main  Services: [phone]  Czech: [phone]  Singaporean: [phone]  Hmong/Monico/Icelandic: [phone]  Preadmission Nursing Department Fax Number: [phone] (Fax all pre-operative paperwork to this number)    For urgent matters arising during evenings, weekends, or holidays that cannot wait for normal business hours please call [phone] and ask for the dermatology resident on call to be paged.

## 2022-06-06 NOTE — LETTER
6/6/2022       RE: Janet Ryder  [address]  [facility] [address]     Dear Colleague,    Thank you for referring your patient, Janet Ryder, to the [facility] [place] [facility]. Please see a copy of my visit note below.    [facility] Dermatology Note  Encounter Date: Jun 6, 2022  Office Visit     Dermatology Problem List:  1. Family history of melanoma  - Paternal grandfather  2. Onychomycosis  - Continue ciclopirox 8% ointment, removing every week  3. Rosacea  - Start metronidazole  ____________________________________________    Assessment & Plan:    # Multiple benign nevi and lentigines    - No concerning lesions today  - Monitor for ABCDEs of melanoma   - Continue sun protection - recommend SPF 30 or higher with frequent application   - Return sooner if noticing changing or symptomatic lesions    # Cherry angiomas and seborrheic keratoses   - Discussed the natural history and benign nature of this lesion. Reassurance provided that no additional treatment is necessary.      # Onychomycosis  - Refills of Ciclopirox 8% solution provided.   - Continue removing the medication once a week.    # Sebaceous hyperplasia  - Discussed the benign nature of these lesions. Offered tretinoin cream however with previous insurance difficulties, patient deferred.    # Acne Rosacea  - Patient notes certain alcohols and dietary changes make the condition flare.   - Caffeine beverages (like coffee), spicy foods, red darinel with tannins may make the rosacea worse.  - Start metronidazole BID to affected areas.    # no scaly plaque to the nose.   -pt exfoliated yesterday.   -Re-examine at follow up.     Procedures Performed:   None.    Follow-up: 4 months(s) in-person, or earlier for new or changing lesions    Staff and Scribe:     Scribe Disclosure:  KISHOR, Celine Baker, am serving as a scribe to document services  personally performed by Perri Valdovinos PA-C based on data collection and the provider's statements to me.     Provider Disclosure:   The documentation recorded by the scribe accurately reflects the services I personally performed and the decisions made by me.    All risks, benefits and alternatives were discussed with patient.  Patient is in agreement and understands the assessment and plan.  All questions were answered.  Sun Screen Education was given.   Return to Clinic in 4 months or sooner as needed.   Perri Valdovinos PA-C   [facility]   ____________________________________________    CC: Derm Problem (Peeling on nose, also spot on her face, states about 5-6 years ago she got spots frozen off, also spot on her lower hip, )    HPI:  Ms. Janet Ryder is a(n) 42 year old female who presents today as a new patient for a full body skin check. The patient reports that she has had AKs frozen from her face in the past.    Today, the patient reports that she had consistent peeling on the top and sides of the nose for 9-12 months. She denies the probability that the peeling is from the mask. She wears sunscreen daily and exfoliates. It has never gone away completely. She had lots of sun exposure as a kid without sunscreen. She can't recall any bad sunburns apart from mowing the lawn a few times. [place].    Patient is otherwise feeling well, without additional skin concerns.    Patient works at home.     Labs Reviewed:  N/A    Physical Exam:  Vitals: There were no vitals taken for this visit.  SKIN: Total skin excluding the undergarment areas was performed. The exam included the head/face, neck, both arms, chest, back, abdomen, both legs, digits and/or nails.   - Pink pustules and macules on the upper cheeks  - Mild erythema on nose and upper cheeks  - Red smooth well-defined macules on trunk and extremities.  - Brown, stuck-on scaly appearing  papules on trunk and extremities.  - Well circumscribed macules with symmetric color distribution on trunk and extremities.  - [place] WD smooth macules on face, neck, trunk, and extremities.  - Thickened and yellow on left great toenail and fourth toenail plates.  - Scattered yellow oily papules with central umbilication on the face.  - No other lesions of concern on areas examined.               Medications:  Current Outpatient Medications   Medication     buPROPion (WELLBUTRIN) 75 MG tablet     Cholecalciferol (VITAMIN D PO)     ciclopirox (PENLAC) 8 % external solution     ferrous sulfate (FEROSUL) 325 (65 Fe) MG tablet     Multiple Vitamin (DAILY MULTIVITAMIN PO)     No current facility-administered medications for this visit.      Past Medical History:   Patient Active Problem List   Diagnosis     [facility] SPRAIN SACROILIAC     [place] SPRAIN THORACIC REG[place]     Lumbosacral ligament sprain     [facility] OTHER BACK SYMPTOMS     Contraception     CARDIOVASCULAR SCREENING; LDL GOAL LESS THAN 160     Abdominal pain     Seasonal allergies     Atypical migraine     Rosacea     Iron deficiency     Interstitial cystitis     Family history of malignant neoplasm of ovary     Urgency-frequency syndrome     Dyspareunia in female     Osteoarthritis resulting from right hip dysplasia     Tear of right acetabular labrum, initial encounter     Current moderate episode of major depressive disorder without prior episode (H)     Family history of malignant neoplasm of breast     Past Medical History:   Diagnosis Date     Atypical migraine         CC No referring provider defined for this encounter. on close of this encounter.

## 2022-06-06 NOTE — NURSING NOTE
Dermatology Rooming Note    Janet Ryder's goals for this visit include:   Chief Complaint   Patient presents with     Derm Problem     Peeling on nose, also spot on her face, states about 5-6 years ago she got spots frozen off, also spot on her lower hip,      Lana Schulte, Visit Facilitator

## 2022-06-06 NOTE — PROGRESS NOTES
[facility] Dermatology Note  Encounter Date: Jun 6, 2022  Office Visit     Dermatology Problem List:  1. Family history of melanoma  - Paternal grandfather  2. Onychomycosis  - Continue ciclopirox 8% ointment, removing every week  3. Rosacea  - Start metronidazole  ____________________________________________    Assessment & Plan:    # Multiple benign nevi and lentigines    - No concerning lesions today  - Monitor for ABCDEs of melanoma   - Continue sun protection - recommend SPF 30 or higher with frequent application   - Return sooner if noticing changing or symptomatic lesions    # Cherry angiomas and seborrheic keratoses   - Discussed the natural history and benign nature of this lesion. Reassurance provided that no additional treatment is necessary.      # Onychomycosis  - Refills of Ciclopirox 8% solution provided.   - Continue removing the medication once a week.    # Sebaceous hyperplasia  - Discussed the benign nature of these lesions. Offered tretinoin cream however with previous insurance difficulties, patient deferred.    # Acne Rosacea  - Patient notes certain alcohols and dietary changes make the condition flare.   - Caffeine beverages (like coffee), spicy foods, red darinel with tannins may make the rosacea worse.  - Start metronidazole BID to affected areas.    # no scaly plaque to the nose.   -pt exfoliated yesterday.   -Re-examine at follow up.     Procedures Performed:   None.    Follow-up: 4 months(s) in-person, or earlier for new or changing lesions    Staff and Scribe:     Scribe Disclosure:  I, Celine Baker, am serving as a scribe to document services personally performed by Perri Valdovinos PA-C based on data collection and the provider's statements to me.     Provider Disclosure:   The documentation recorded by the scribe accurately reflects the services I personally performed and the decisions made by me.    All risks, benefits and alternatives were discussed  with patient.  Patient is in agreement and understands the assessment and plan.  All questions were answered.  Sun Screen Education was given.   Return to Clinic in 4 months or sooner as needed.   Perri Valdovinos PA-C   [facility]   ____________________________________________    CC: Derm Problem (Peeling on nose, also spot on her face, states about 5-6 years ago she got spots frozen off, also spot on her lower hip, )    HPI:  Ms. Janet Ryder is a(n) 42 year old female who presents today as a new patient for a full body skin check. The patient reports that she has had AKs frozen from her face in the past.    Today, the patient reports that she had consistent peeling on the top and sides of the nose for 9-12 months. She denies the probability that the peeling is from the mask. She wears sunscreen daily and exfoliates. It has never gone away completely. She had lots of sun exposure as a kid without sunscreen. She can't recall any bad sunburns apart from mowing the lawn a few times. [place]    Patient is otherwise feeling well, without additional skin concerns.    Patient works at home.     Labs Reviewed:  N/A    Physical Exam:  Vitals: There were no vitals taken for this visit.  SKIN: Total skin excluding the undergarment areas was performed. The exam included the head/face, neck, both arms, chest, back, abdomen, both legs, digits and/or nails.   - Pink pustules and macules on the upper cheeks  - Mild erythema on nose and upper cheeks  - Red smooth well-defined macules on trunk and extremities.  - Brown, stuck-on scaly appearing papules on trunk and extremities.  - Well circumscribed macules with symmetric color distribution on trunk and extremities.  - [place] WD smooth macules on face, neck, trunk, and extremities.  - Thickened and yellow on left great toenail and fourth toenail plates.  - Scattered yellow oily papules with central umbilication on the  face.  - No other lesions of concern on areas examined.               Medications:  Current Outpatient Medications   Medication     buPROPion (WELLBUTRIN) 75 MG tablet     Cholecalciferol (VITAMIN D PO)     ciclopirox (PENLAC) 8 % external solution     ferrous sulfate (FEROSUL) 325 (65 Fe) MG tablet     Multiple Vitamin (DAILY MULTIVITAMIN PO)     No current facility-administered medications for this visit.      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 on close of this encounter.

## 2022-06-08 ENCOUNTER — THERAPY VISIT (OUTPATIENT)
Dept: SLEEP MEDICINE | Facility: CLINIC | Age: 42
End: 2022-06-08
Payer: COMMERCIAL

## 2022-06-08 DIAGNOSIS — G47.61 PERIODIC LIMB MOVEMENT: ICD-10-CM

## 2022-06-08 DIAGNOSIS — G47.63 SLEEP RELATED BRUXISM: ICD-10-CM

## 2022-06-08 DIAGNOSIS — R53.83 FATIGUE, UNSPECIFIED TYPE: ICD-10-CM

## 2022-06-08 DIAGNOSIS — R51.9 SLEEP RELATED HEADACHES: ICD-10-CM

## 2022-06-08 PROCEDURE — 95810 POLYSOM 6/> YRS 4/> PARAM: CPT | Performed by: INTERNAL MEDICINE

## 2022-06-14 LAB — SLPCOMP: NORMAL

## 2022-06-30 ENCOUNTER — VIRTUAL VISIT (OUTPATIENT)
Dept: SLEEP MEDICINE | Facility: CLINIC | Age: 42
End: 2022-06-30
Payer: COMMERCIAL

## 2022-06-30 VITALS — BODY MASS INDEX: 27.94 KG/M2 | WEIGHT: 178 LBS | HEIGHT: 67 IN

## 2022-06-30 DIAGNOSIS — R53.83 FATIGUE, UNSPECIFIED TYPE: ICD-10-CM

## 2022-06-30 DIAGNOSIS — G47.21 SLEEP DISORDER, CIRCADIAN, DELAYED SLEEP PHASE TYPE: Primary | ICD-10-CM

## 2022-06-30 PROCEDURE — 99213 OFFICE O/P EST LOW 20 MIN: CPT | Mod: 95 | Performed by: INTERNAL MEDICINE

## 2022-06-30 ASSESSMENT — SLEEP AND FATIGUE QUESTIONNAIRES
HOW LIKELY ARE YOU TO NOD OFF OR FALL ASLEEP WHILE WATCHING TV: WOULD NEVER DOZE
HOW LIKELY ARE YOU TO NOD OFF OR FALL ASLEEP WHILE SITTING AND READING: SLIGHT CHANCE OF DOZING
HOW LIKELY ARE YOU TO NOD OFF OR FALL ASLEEP WHILE LYING DOWN TO REST IN THE AFTERNOON WHEN CIRCUMSTANCES PERMIT: WOULD NEVER DOZE
HOW LIKELY ARE YOU TO NOD OFF OR FALL ASLEEP WHILE SITTING QUIETLY AFTER LUNCH WITHOUT ALCOHOL: WOULD NEVER DOZE
HOW LIKELY ARE YOU TO NOD OFF OR FALL ASLEEP WHILE SITTING AND TALKING TO SOMEONE: WOULD NEVER DOZE
HOW LIKELY ARE YOU TO NOD OFF OR FALL ASLEEP IN A CAR, WHILE STOPPED FOR A FEW MINUTES IN TRAFFIC: WOULD NEVER DOZE
HOW LIKELY ARE YOU TO NOD OFF OR FALL ASLEEP WHEN YOU ARE A PASSENGER IN A CAR FOR AN HOUR WITHOUT A BREAK: WOULD NEVER DOZE
HOW LIKELY ARE YOU TO NOD OFF OR FALL ASLEEP WHILE SITTING INACTIVE IN A PUBLIC PLACE: WOULD NEVER DOZE

## 2022-06-30 NOTE — PROGRESS NOTES
Immanuel is a 42 year old who is being evaluated via a billable video visit.      How would you like to obtain your AVS? MyChart  If the video visit is dropped, the invitation should be resent by: Text to cell phone: [phone]  Will anyone else be joining your video visit? Tracy    Alicia Longo    Video-Visit Details    Video Start Time: 9:23 AM    Type of service:  Video Visit    Video End Time:9:39 AM    Originating Location (pt. Location): Home    Distant Location (provider location):  [facility]     Platform used for Video Visit: [facility]     Additional 10 minutes on the date of service was spent performing the following:    -Preparing to see the patient  -Ordering medications, tests, or procedures   -Documenting clinical information in the electronic or other health record     Thank you for the opportunity to participate in the care of Immanuel Ryder.     She is a 42 year old y/o female patient who comes to the sleep medicine clinic for review of her sleep study.  The patient underwent a sleep study on 06/08/2022 which showed that she had mild snoring but did not rule in for obstructive sleep apnea.  The patient also affirmed that she slept very well on the night of the sleep study.  One of the factors might have been that she took low-dose melatonin on the night of the study.     Assessment and Plan:  In summary Immanuel Ryder is a 42 year old year old female who is here for follow up.  1. Sleep disorder, circadian, delayed sleep phase type  I recommend that we initiate low-dose melatonin 3 to 5 hours before desired sleep onset on a daily basis.  The patient is not to drive or operate machinery after melatonin intake.  I also recommend daily aerobic exercise for at least 30 minutes if possible.  We also discussed the option of using bluelight blocking glasses at around 7 PM at night.  Return to clinic in 3 months if her symptoms persist.    2. Fatigue, unspecified type  No  change    ANGELO:  ANGELO Total Score: 13  Total score - [place]: 1 (6/30/2022  9:16 AM)        Patient Active Problem List   Diagnosis     iamc SPRAIN SACROILIAC     iamc SPRAIN THORACIC REGION     Lumbosacral ligament sprain     [facility] OTHER BACK SYMPTOMS     Contraception     CARDIOVASCULAR SCREENING; LDL GOAL LESS THAN 160     Abdominal pain     Seasonal allergies     Atypical migraine     Rosacea     Iron deficiency     Interstitial cystitis     Family history of malignant neoplasm of ovary     Urgency-frequency syndrome     Dyspareunia in female     Osteoarthritis resulting from right hip dysplasia     Tear of right acetabular labrum, initial encounter     Current moderate episode of major depressive disorder without prior episode (H)     Family history of malignant neoplasm of breast       Past Medical History:   Diagnosis Date     Atypical migraine        Past Surgical History:   Procedure Laterality Date     ORTHOPEDIC SURGERY       [facility] NONSPECIFIC PROCEDURE  AGE 6, AGE 10    REMOVAL OF FACIAL SKIN CYSTS     Z NONSPECIFIC PROCEDURE  AGE 22    SUTURES FOR FACIAL LACERATION       Social History     Socioeconomic History     Marital status:      Spouse name: Not on file     Number of children: Not on file     Years of education: Not on file     Highest education level: Not on file   Occupational History     Employer: NONE      Comment: [employer]   Tobacco Use     Smoking status: Never Smoker     Smokeless tobacco: Never Used   Substance and Sexual Activity     Alcohol use: Yes     Comment: 3 or 4 drinks per week     Drug use: No     Sexual activity: Yes     Partners: Male     Birth control/protection: None   Other Topics Concern     Parent/sibling w/ CABG, MI or angioplasty before 65F 55M? No   Social History Narrative    Caffeine intake/servings daily - 1-2    Calcium intake/servings daily - 2+    Exercise 3 times weekly - describe swim, wts, basketball    Sunscreen used - Yes    Seatbelts used - Yes     Guns stored in the home - No    Self Breast Exam - No    Pap test up to date -  Yes    Eye exam up to date -  Yes    Dental exam up to date -  Yes    DEXA scan up to date -  Not Applicable    Flex Sig/Colonoscopy up to date -  Not Applicable    Mammography up to date -  Not Applicable    Immunizations reviewed and up to date - Yes    Abuse: Current or Past (Physical, Sexual or Emotional) - No    Do you feel safe in your environment - Yes    Do you cope well with stress - No    Do you suffer from insomnia - Yes    Last updated by: CARLSO CROSS  5/11/2011                     Social Determinants of Health     Financial Resource Strain: Not on file   Food Insecurity: Not on file   Transportation Needs: Not on file   Physical Activity: Not on file   Stress: Not on file   Social Connections: Not on file   Intimate Partner Violence: Not on file   Housing Stability: Not on file       Current Outpatient Medications   Medication Sig Dispense Refill     buPROPion (WELLBUTRIN) 75 MG tablet Take 2 in the am and 4 in the pm 270 tablet 3     Cholecalciferol (VITAMIN D PO) Take 5,000 Units by mouth.       ciclopirox (PENLAC) 8 % external solution Apply to adjacent skin and affected nails daily.  Remove with alcohol every 7 days, then repeat. 6 mL 11     ferrous sulfate (FEROSUL) 325 (65 Fe) MG tablet Take 325 mg by mouth daily (with breakfast)       metroNIDAZOLE (METROCREAM) 0.75 % external cream Apply topically 2 times daily To the cheeks and nose 45 g 3     Multiple Vitamin (DAILY MULTIVITAMIN PO) Take  by mouth.         Allergies   Allergen Reactions     Shellfish-Derived Products Hives     Amoxicillin      Erythromycin      No Clinical Screening - See Comments Nausea and Vomiting     Eggplant     Scopolamine      Redness and swelling of the face       Physical Exam:  GEN: NAD,  Psych: normal mood, normal affect    Patient verbalized understanding of these issues, agrees with the plan and all questions were answered  today. Patient was given an opportuntity to voice any other symptoms or concerns not listed above. Patient did not have any other symptoms or concerns.      Terry Trinidad DO  Board Certified in Internal Medicine and Sleep Medicine    (Note created with Dragon voice recognition and unintended spelling errors and word substitutions may occur)     Audio and visual devices were used for this virtual clinic visit with permission from patient.

## 2022-06-30 NOTE — PATIENT INSTRUCTIONS
"Melatonin    Melatonin is a natural hormone that is produced by the brain's pineal gland. It is considered a \"darkness signal\" for your body and seems to play an important role in sleep.     As night falls and it becomes dark more melatonin is produced by the pineal gland. Melatonin then signals the part of the brain that regulates your sleep-wake cycle.  This at your body and know that time for sleep is approaching.  The melatonin levels in the brain and blood drops back down in the morning they also decreased when her eyes are exposed to bright light.    Body cycles that recur every 24 hours or called \"circadian rhythms.\"  Sleep is one of the cycles.  Other circadian cycles include body temperature and the production of some hormone such as cortisol and growth hormone.  Also also may help to regulate some of the bodies other circadian cycles.    Melatonin is widely available in the [place] as a nutritional supplement.  It can be purchased without a prescription.  Melatonin has been studied at doses ranging from 0.3 mg to 10 mg.  Most products, and doses of 1 mg to 5 mg.    While research has not yet determined which dose is most effective.  But it is possible for a low dose to work better than a high-dose.  A small \"physiologic dose\" of about 0.3 mg closely resembles the level of your body's natural melatonin production.  Larger doses produces a \"pharmacologic effect\".  They cause a melatonin of blood to peak at a much higher level.    Studies also show that time he may be more important and dose.  It is critical that she take the melatonin at the appropriate time of day.  This time will vary depending on the nature of your sleep problem.  The most effective time also can vary from one person to another.    In certain cases melatonin may be even more effective when used as part of a treatment plan that also includes bright light therapy.  The timing of both treatment is of extreme importance.  If used at improper " times, each treatment may negate the effects of the other.    Delayed Sleep Phase    Delayed sleep phase disorder (circadian phase delay) occurs when the timing of your circadian rhythm is delayed by 2 or more hours.  As a result you regularly go to bed late at night and wake up late in the morning.    A moderate level of evidence supports melatonin as a treatment for circadian phase delay.  Taking low-dose melatonin 3-5 hours before desired sleep onset on a nightly basis may help shift your circadian rhythm so that you will feel more receptive to sleep and earlier time.    Try to budget at least 8 hours of sleep after taking melatonin.  Do not drive or operate machinery after intake of melatonin.    I recommend initiating low dose Melatonin at around 8-9 PM at night.    I also recommend  daily exercise for at least 30 minutes a day.    I also recommend blue light blocking glasses at around 7 PM

## 2022-09-22 ENCOUNTER — OFFICE VISIT (OUTPATIENT)
Dept: OPHTHALMOLOGY | Facility: CLINIC | Age: 42
End: 2022-09-22
Payer: COMMERCIAL

## 2022-09-22 DIAGNOSIS — H53.143 COMPUTER VISION SYNDROME OF BOTH EYES: Primary | ICD-10-CM

## 2022-09-22 DIAGNOSIS — H10.13 ALLERGIC CONJUNCTIVITIS OF BOTH EYES: ICD-10-CM

## 2022-09-22 DIAGNOSIS — H52.223 REGULAR ASTIGMATISM OF BOTH EYES: ICD-10-CM

## 2022-09-22 PROCEDURE — 92004 COMPRE OPH EXAM NEW PT 1/>: CPT | Performed by: OPTOMETRIST

## 2022-09-22 PROCEDURE — 92015 DETERMINE REFRACTIVE STATE: CPT | Performed by: OPTOMETRIST

## 2022-09-22 ASSESSMENT — VISUAL ACUITY
OS_SC+: -2
OS_SC: 20/20
METHOD: SNELLEN - LINEAR
OD_SC: 20/20
METHOD_MR_RETINOSCOPY: 1

## 2022-09-22 ASSESSMENT — REFRACTION_MANIFEST
OS_SPHERE: -0.25
OS_AXIS: 090
OD_CYLINDER: +0.25
OS_CYLINDER: +0.25
OD_SPHERE: +0.25
OD_AXIS: 090

## 2022-09-22 ASSESSMENT — SLIT LAMP EXAM - LIDS
COMMENTS: NORMAL
COMMENTS: NORMAL

## 2022-09-22 ASSESSMENT — TONOMETRY
IOP_METHOD: ICARE
OS_IOP_MMHG: 14
OD_IOP_MMHG: 15

## 2022-09-22 ASSESSMENT — EXTERNAL EXAM - RIGHT EYE: OD_EXAM: NORMAL

## 2022-09-22 ASSESSMENT — CUP TO DISC RATIO
OD_RATIO: 0.2
OS_RATIO: 0.2

## 2022-09-22 ASSESSMENT — CONF VISUAL FIELD
OS_NORMAL: 1
METHOD: COUNTING FINGERS
OD_NORMAL: 1

## 2022-09-22 ASSESSMENT — EXTERNAL EXAM - LEFT EYE: OS_EXAM: NORMAL

## 2022-09-22 NOTE — PROGRESS NOTES
History  HPI     Patient states that her vision is well in both eyes. 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 She states that at times her eyes feel itchy. No flashes of light. She states that she does have floaters, but no changes.     Ocular Meds:none     Brandan Keane JOHANN, September 22, 2022 7:44 AM          Last edited by Brandan Keane on 9/22/2022  7:49 AM. (History)          Assessment/Plan  (H53.143) Computer vision syndrome of both eyes  (primary encounter diagnosis)  Comment: Eye strain with computer use  Plan:  Educated patient on clinical findings. Recommended 20/20/20 rule to decrease eye fatigue. Discussed blue light filtering lenses as an option, but explained that there is little evidence to support the benefit. Monitor annually.    (H10.13) Allergic conjunctivitis of both eyes  Comment: Mild corneal staining and papillary conjunctivitis  Plan:  Recommended Pataday once every day both eyes. Monitor as needed.    (H52.223) Regular astigmatism of both eyes  Comment: Minimal refractive error, excellent uncorrected acuity at distance and near both eyes   Plan: REFRACTION         No spectacle prescription indicated at this time. Monitor annually.    Return to clinic in 1 year for comprehensive eye exam.    Complete documentation of historical and exam elements from today's encounter can  be found in the full encounter summary report (not reduplicated in this progress  note). I personally obtained the chief complaint(s) and history of present illness. I  confirmed and edited as necessary the review of systems, past medical/surgical  history, family history, social history, and examination findings as documented by  others; and I examined the patient myself. I personally reviewed the relevant tests,  images, and reports as documented above. I formulated and edited as necessary the  assessment and plan and discussed the findings and management plan  with the  patient and family.    Nemesio Ordaz OD, FAAO

## 2022-09-22 NOTE — NURSING NOTE
Chief Complaints and History of Present Illnesses   Patient presents with     COMPREHENSIVE EYE EXAM     Chief Complaint(s) and History of Present Illness(es)     COMPREHENSIVE EYE EXAM               Comments     Patient states that her vision is well in both eyes. 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 She states that at times her eyes feel itchy. No flashes of light. She states that she does have floaters, but no changes.     Ocular Meds:none     Brandan WILLS, September 22, 2022 7:44 AM

## 2022-11-21 ENCOUNTER — HEALTH MAINTENANCE LETTER (OUTPATIENT)
Age: 42
End: 2022-11-21

## 2022-11-29 NOTE — PROGRESS NOTES
[facility] Dermatology Note  Encounter Date: Nov 30, 2022  Office Visit     Dermatology Problem List:  1. Family history of melanoma  - Paternal grandfather  2. Onychomycosis  - Current: ciclopirox 8% ointment  3. Rosacea  - Current: metronidazole 0.75% cream  # Lesion to monitor, nose, ddx: AK vs ISK   - Efudex cream  ____________________________________________    Assessment & Plan:    # Acne Rosacea. Patient notes certain alcohols and dietary changes make the condition flare. Counseled that caffeine beverages (like coffee), spicy foods, red darinel with tannins may make the rosacea worse.  - Start metronidazole BID to affected areas. Hold when using the Efudex cream.    # Onychomycosis  - Refills of Ciclopirox 8% solution provided.   - Continue removing the medication once a week.     # Lesion to monitor, nose, ddx: AK vs ISK   - Start Efudex cream BID for up to two weeks, avoiding the eyelid area  - Future: Consider cryotherapy   - Recheck at next visit     Procedures Performed:   None.     Follow-up: 4 month(s) in-person, or earlier for new or changing lesions    Staff and Scribe:     Scribe Disclosure:  I, PATTY MEADOWS, am serving as a scribe to document services personally performed by Perri Valdovinos PA-C based on data collection and the provider's statements to me.     Provider Disclosure:   The documentation recorded by the scribe accurately reflects the services I personally performed and the decisions made by me.    All risks, benefits and alternatives were discussed with patient.  Patient is in agreement and understands the assessment and plan.  All questions were answered.  Sun Screen Education was given.   Return to Clinic in 4 months or sooner as needed.   Perri Valdovinos PA-C   [facility]   ____________________________________________    CC: Derm Problem (Follow up.  Nose is peeling and rough.  Doing same since last visit.    )    HPI:  Ms.  Janet Ryder is a(n) 42 year old female who presents today as a return patient for rosacea follow up. Last seen by myself on 6/6/22, at which time the patient was started on metronidazole gel for treatment of acne rosacea.     Today, the patient reports that she has not yet started the metronidazole gel. Additionally, she still has some concerns about rough peeling on the bridge of the nose.     Patient is otherwise feeling well, without additional skin concerns.    Labs Reviewed:  N/A    Physical Exam:  Vitals: There were no vitals taken for this visit.  SKIN: Focused examination of the face was performed.  - Slight gritty scale noted on the right nasal sidewall.  - Rare pink pustules and macules on the upper cheeks.  - Mild erythema on nose and upper cheeks.  - Feet not examined today.   - No other lesions of concern on areas examined.     Medications:  Current Outpatient Medications   Medication     buPROPion (WELLBUTRIN) 75 MG tablet     Cholecalciferol (VITAMIN D PO)     ciclopirox (PENLAC) 8 % external solution     ferrous sulfate (FEROSUL) 325 (65 Fe) MG tablet     metroNIDAZOLE (METROCREAM) 0.75 % external cream     Multiple Vitamin (DAILY MULTIVITAMIN PO)     No current facility-administered medications for this visit.      Past Medical History:   Patient Active Problem List   Diagnosis     [facility] SPRAIN SACROILIAC     [place] SPRAIN THORACIC REGION     Lumbosacral ligament sprain     [facility] OTHER BACK SYMPTOMS     Contraception     CARDIOVASCULAR SCREENING; LDL GOAL LESS THAN 160     Abdominal pain     Seasonal allergies     Atypical migraine     Rosacea     Iron deficiency     Interstitial cystitis     Family history of malignant neoplasm of ovary     Urgency-frequency syndrome     Dyspareunia in female     Osteoarthritis resulting from right hip dysplasia     Tear of right acetabular labrum, initial encounter     Current moderate episode of major depressive disorder without prior episode (H)     Family  history of malignant neoplasm of breast     Past Medical History:   Diagnosis Date     Atypical migraine         CC Leatha Robles MD  [address] [id number] on close of this encounter.

## 2022-11-30 ENCOUNTER — OFFICE VISIT (OUTPATIENT)
Dept: DERMATOLOGY | Facility: CLINIC | Age: 42
End: 2022-11-30
Payer: COMMERCIAL

## 2022-11-30 DIAGNOSIS — B35.1 ONYCHOMYCOSIS: ICD-10-CM

## 2022-11-30 DIAGNOSIS — L71.9 ACNE ROSACEA: ICD-10-CM

## 2022-11-30 DIAGNOSIS — D49.2 NEOPLASM OF UNSPECIFIED BEHAVIOR OF BONE, SOFT TISSUE, AND SKIN: Primary | ICD-10-CM

## 2022-11-30 DIAGNOSIS — L57.0 ACTINIC KERATOSIS: ICD-10-CM

## 2022-11-30 PROCEDURE — 99214 OFFICE O/P EST MOD 30 MIN: CPT | Performed by: PHYSICIAN ASSISTANT

## 2022-11-30 RX ORDER — CICLOPIROX 80 MG/ML
SOLUTION TOPICAL
Qty: 18 ML | Refills: 3 | Status: SHIPPED | OUTPATIENT
Start: 2022-11-30 | End: 2023-09-11

## 2022-11-30 RX ORDER — FLUOROURACIL 50 MG/G
CREAM TOPICAL 2 TIMES DAILY
Qty: 40 G | Refills: 0 | Status: SHIPPED | OUTPATIENT
Start: 2022-11-30 | End: 2022-11-30

## 2022-11-30 RX ORDER — FLUOROURACIL 50 MG/G
CREAM TOPICAL 2 TIMES DAILY
Qty: 40 G | Refills: 0 | Status: SHIPPED | OUTPATIENT
Start: 2022-11-30 | End: 2023-09-11

## 2022-11-30 RX ORDER — CICLOPIROX 80 MG/ML
SOLUTION TOPICAL
Qty: 18 ML | Refills: 3 | Status: SHIPPED | OUTPATIENT
Start: 2022-11-30 | End: 2022-11-30

## 2022-11-30 RX ORDER — CICLOPIROX 80 MG/ML
SOLUTION TOPICAL
Qty: 6 ML | Refills: 11 | Status: SHIPPED | OUTPATIENT
Start: 2022-11-30 | End: 2022-11-30

## 2022-11-30 ASSESSMENT — PAIN SCALES - GENERAL: PAINLEVEL: NO PAIN (0)

## 2022-11-30 NOTE — PATIENT INSTRUCTIONS
Start Efudex twice daily for 2-3 weeks or until the onset of irritation. Discussed that we would expect irritation form this medications. Recommend the patient wash hands after use or use gloves to apply. Keep medication away from pets. Avoid sun exposure to treated area. Do not occlude treated area with bandages.

## 2022-11-30 NOTE — LETTER
11/30/2022       RE: Jante Ryder  [address]  [facility] [address]     Dear Colleague,    Thank you for referring your patient, Janet Ryder, to the [facility] [place] at . Please see a copy of my visit note below.    [facility] Dermatology Note  Encounter Date: Nov 30, 2022  Office Visit     Dermatology Problem List:  1. Family history of melanoma  - Paternal grandfather  2. Onychomycosis  - Current: ciclopirox 8% ointment  3. Rosacea  - Current: metronidazole 0.75% cream  # Lesion to monitor, nose, ddx: AK vs ISK   - Efudex cream  ____________________________________________    Assessment & Plan:    # Acne Rosacea. Patient notes certain alcohols and dietary changes make the condition flare. Counseled that caffeine beverages (like coffee), spicy foods, red darinel with tannins may make the rosacea worse.  - Start metronidazole BID to affected areas. Hold when using the Efudex cream.    # Onychomycosis  - Refills of Ciclopirox 8% solution provided.   - Continue removing the medication once a week.     # Lesion to monitor, nose, ddx: AK vs ISK   - Start Efudex cream BID for up to two weeks, avoiding the eyelid area  - Future: Consider cryotherapy   - Recheck at next visit     Procedures Performed:   None.     Follow-up: 4 month(s) in-person, or earlier for new or changing lesions    Staff and Scribe:     Scribe Disclosure:  PATTY IVERSON, am serving as a scribe to document services personally performed by Perri Valdovinos PA-C based on data collection and the provider's statements to me.     Provider Disclosure:   The documentation recorded by the scribe accurately reflects the services I personally performed and the decisions made by me.    All risks, benefits and alternatives were discussed with patient.  Patient is in agreement and understands the assessment and plan.  All questions  were answered.  Sun Screen Education was given.   Return to Clinic in 4 months or sooner as needed.   Perri Valdovinos PA-C   [facility]   ____________________________________________    CC: Derm Problem (Follow up.  Nose is peeling and rough.  Doing same since last visit.    )    HPI:  Ms. Janet Ryder is a(n) 42 year old female who presents today as a return patient for rosacea follow up. Last seen by myself on 6/6/22, at which time the patient was started on metronidazole gel for treatment of acne rosacea.     Today, the patient reports that she has not yet started the metronidazole gel. Additionally, she still has some concerns about rough peeling on the bridge of the nose.     Patient is otherwise feeling well, without additional skin concerns.    Labs Reviewed:  N/A    Physical Exam:  Vitals: There were no vitals taken for this visit.  SKIN: Focused examination of the face was performed.  - Slight gritty scale noted on the right nasal sidewall.  - Rare pink pustules and macules on the upper cheeks.  - Mild erythema on nose and upper cheeks.  - Feet not examined today.   - No other lesions of concern on areas examined.     Medications:  Current Outpatient Medications   Medication     buPROPion (WELLBUTRIN) 75 MG tablet     Cholecalciferol (VITAMIN D PO)     ciclopirox (PENLAC) 8 % external solution     ferrous sulfate (FEROSUL) 325 (65 Fe) MG tablet     metroNIDAZOLE (METROCREAM) 0.75 % external cream     Multiple Vitamin (DAILY MULTIVITAMIN PO)     No current facility-administered medications for this visit.      Past Medical History:   Patient Active Problem List   Diagnosis     [facility] SPRAIN SACROILIAC     [facility] SPRAIN THORACIC REGION     Lumbosacral ligament sprain     [facility] OTHER BACK SYMPTOMS     Contraception     CARDIOVASCULAR SCREENING; LDL GOAL LESS THAN 160     Abdominal pain     Seasonal allergies     Atypical migraine     Rosacea     Iron deficiency     Interstitial  cystitis     Family history of malignant neoplasm of ovary     Urgency-frequency syndrome     Dyspareunia in female     Osteoarthritis resulting from right hip dysplasia     Tear of right acetabular labrum, initial encounter     Current moderate episode of major depressive disorder without prior episode (H)     Family history of malignant neoplasm of breast     Past Medical History:   Diagnosis Date     Atypical migraine          Again, thank you for allowing me to participate in the care of your patient.      Sincerely,    Perri Valdovinos PA-C

## 2022-11-30 NOTE — NURSING NOTE
Dermatology Rooming Note    Janet Ryder's goals for this visit include:   Chief Complaint   Patient presents with     Derm Problem     Follow up.  Nose is peeling and rough.  Doing same since last visit.         Lucero Wakefield CMA

## 2022-12-01 DIAGNOSIS — N63.20 MASS OF LEFT BREAST, UNSPECIFIED QUADRANT: Primary | ICD-10-CM

## 2022-12-02 ENCOUNTER — ANCILLARY PROCEDURE (OUTPATIENT)
Dept: MAMMOGRAPHY | Facility: CLINIC | Age: 42
End: 2022-12-02
Attending: PHYSICIAN ASSISTANT
Payer: COMMERCIAL

## 2022-12-02 ENCOUNTER — OFFICE VISIT (OUTPATIENT)
Dept: SURGERY | Facility: CLINIC | Age: 42
End: 2022-12-02
Attending: PHYSICIAN ASSISTANT
Payer: COMMERCIAL

## 2022-12-02 VITALS
SYSTOLIC BLOOD PRESSURE: 114 MMHG | HEART RATE: 73 BPM | WEIGHT: 183.8 LBS | BODY MASS INDEX: 29.54 KG/M2 | TEMPERATURE: 97.7 F | OXYGEN SATURATION: 100 % | RESPIRATION RATE: 14 BRPM | HEIGHT: 66 IN | DIASTOLIC BLOOD PRESSURE: 75 MMHG

## 2022-12-02 DIAGNOSIS — Z80.41 FAMILY HISTORY OF OVARIAN CANCER: ICD-10-CM

## 2022-12-02 DIAGNOSIS — Z12.39 BREAST CANCER SCREENING, HIGH RISK PATIENT: ICD-10-CM

## 2022-12-02 DIAGNOSIS — N63.20 MASS OF LEFT BREAST, UNSPECIFIED QUADRANT: ICD-10-CM

## 2022-12-02 DIAGNOSIS — Z91.89 AT HIGH RISK FOR BREAST CANCER: ICD-10-CM

## 2022-12-02 DIAGNOSIS — Z80.3 FAMILY HISTORY OF BREAST CANCER: ICD-10-CM

## 2022-12-02 DIAGNOSIS — N63.10 MASS OF RIGHT BREAST, UNSPECIFIED QUADRANT: Primary | ICD-10-CM

## 2022-12-02 PROCEDURE — G0463 HOSPITAL OUTPT CLINIC VISIT: HCPCS

## 2022-12-02 PROCEDURE — 77066 DX MAMMO INCL CAD BI: CPT | Performed by: RADIOLOGY

## 2022-12-02 PROCEDURE — 76642 ULTRASOUND BREAST LIMITED: CPT | Mod: RT | Performed by: RADIOLOGY

## 2022-12-02 PROCEDURE — G0279 TOMOSYNTHESIS, MAMMO: HCPCS | Performed by: RADIOLOGY

## 2022-12-02 PROCEDURE — 99213 OFFICE O/P EST LOW 20 MIN: CPT | Performed by: PHYSICIAN ASSISTANT

## 2022-12-02 ASSESSMENT — PAIN SCALES - GENERAL: PAINLEVEL: MILD PAIN (3)

## 2022-12-02 NOTE — LETTER
12/2/2022         RE: Janet Rydre  [address]  [facility] [address]        Dear Colleague,    Thank you for referring your patient, Janet Ryder, to the [facility]. Please see a copy of my visit note below.    FOLLOW UP  Dec 2, 2022     Janet Ryder is a 41 year old woman who presents with a breast lump.     HPI:    Family history of mother who had ovarian cancer. Paternal aunt with history of breast cancer. 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     She has a history of bilateral breast cyst that have been aspirated.    She presents today with new right breast masses and pain for the last 3 months. The pain and masses fluctuate with her menstrual cycle. She denies any skin change, nipple inversion or nipple discharge.     BREAST-SPECIFIC HISTORY:    Previous breast imaging: Yes   - 12/12/18 b/l Dmammo and left breast ultrasound for pain: benign appearing cyst 10:00 BI-RADS 2, aspiration   - 8/2/21 b/l Dmammo and left breast ultrasound: 12:00 2 cm FN 3.1 cm cyst. BI -RADS 2    Prior breast biopsies/surgeries: No  - right (2014) and left (2018) breast cyst aspiration    Prior history of breast cancer or DCIS: No  Prior radiation history: No   Self breast exams: No  Breast density: heterogeneously dense    GYN HISTORY:  G0  Age at menarche: 14  Menopausal: premenopausal   Menopausal hormone replacement therapy: no    RISK ASSESSMENT: < 3% 5 year risk and > 20% lifetime risk   Patti:0.6% 5 year risk   KERLINE/Tyrer-Cuzick: 24.1% lifetime risk  Spencer: 9.4% lifetime risk    FAMILY HISTORY:  Breast ca: Yes  - paternal aunt, 60's  Ovarian ca: Yes   - mother, 67  Pancreatic ca: No  Prostate: No  Gastric ca: No  Melanoma: Yes   - maternal grandfather  Colon ca: Yes   - maternal grandfather  Other cancer: No  Other genetic, testing, syndromes, or clotting disorders: no     PAST MEDICAL HISTORY  Past Medical History:   Diagnosis Date     Atypical  migraine      PAST SURGICAL HISTORY   Past Surgical History:   Procedure Laterality Date     ORTHOPEDIC SURGERY       [facility] NONSPECIFIC PROCEDURE  AGE 6, AGE 10    REMOVAL OF FACIAL SKIN CYSTS     [facility] NONSPECIFIC PROCEDURE  AGE 22    SUTURES FOR FACIAL LACERATION     MEDICATIONS  Current Outpatient Medications   Medication Sig Dispense Refill     buPROPion (WELLBUTRIN) 75 MG tablet Take 2 in the am and 4 in the pm 270 tablet 3     Cholecalciferol (VITAMIN D PO) Take 5,000 Units by mouth.       ciclopirox (PENLAC) 8 % external solution Apply to adjacent skin and affected nails daily.  Remove with alcohol every 7 days, then repeat. 18 mL 3     ferrous sulfate (FEROSUL) 325 (65 Fe) MG tablet Take 325 mg by mouth daily (with breakfast)       fluorouracil (EFUDEX) 5 % external cream Apply topically 2 times daily To the nose, cheeks (avoiding the eyelids) up to 2 weeks. 40 g 0     metroNIDAZOLE (METROCREAM) 0.75 % external cream Apply topically 2 times daily To the cheeks and nose 135 g 3     Multiple Vitamin (DAILY MULTIVITAMIN PO) Take  by mouth.       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     ROS:  Change in vision No  Headaches: no  Respiratory: No shortness of breath, dyspnea on exertion, cough, or hemoptysis   Cardiovascular: negative   Gastrointestinal: negative Abdominal pain: no  Breast: left breast mass  Musculoskeletal: negative Joint pain No Back pain: no  Psychiatric: negative  Hematologic/Lymphatic/Immunologic: negative  Endocrine: negative    EXAM  /75 (BP Location: Right arm, Patient Position: Sitting, Cuff Size: Adult Regular)   Pulse 73   Temp 97.7  F (36.5  C) (Oral)   Resp 14   Ht  "1.676 m (5' 6\")   Wt 83.4 kg (183 lb 12.8 oz)   LMP 12/02/2022 (Exact Date)   SpO2 100%   BMI 29.67 kg/m     PHYSICAL EXAM  Respiratory: breathing non labored.   Breasts: Examination was done in both the upright and supine positions.  Breasts are symmetrical . No skin or nipple changes. No nipple discharge. Right breast with nodular dense breast tissue in upper outer breast, no mass. Left breast mass at 12:00 measuring 1.5 cm (small than last exam) Right breast 1:00 2 cm from the nipple mobile 2 cm mass. Right breast 6:00 retroareolar, 2x3 cm smooth mobile mass. Right breast 3:00 3 cm from the nipple firm 1 cm mobile mass.   No clavicular, cervical, or axillary lymphadenopathy.     INVESTIGATIONS:    12/2/22 bilateral diagnostic mammogram and right breast ultrasound: per Radiology benign appearing cysts.     ASSESSMENT/PLAN:    Janet Ryder is a 41 year old woman with right breast masses. Imaging today demonstrated benign appearing cysts. She meets NCCN guidelines for high risk screening based on her family history of breast and ovarian cancer.     1) Right breast cyst, no concerning findings on imaging.   - Follow up with any worsening symptoms or increase in size.     2) Family history of breast cancer  Discussed she meets NCCN guidelines for high risk screening based on family history with lifetime risk for breast cancer of >20%. Screening recommendations based on NCCN guidelines. Clinical encounter every 6-12 months starting now. Annual mammogram with jason alternating with annual breast MRI. She plans to continue with mammogram for screening for now.   - Screening mammogram with clinic visit due 12/3/23    3) Lifestyle Modifications were provided.   - Maintain a healthy weight (BMI 20-25). Higher body mass index (BMI) and adult weight gain is associated with increased risk for breast cancer. This increase in risk has been attributed to increase in circulating endogenous estrogen levels from fat tissue. "   - Alcohol consumption, even at moderate levels (1-2 drinks per day), increases breast cancer risk and are best avoided. If you choose to drink alcohol limit alcohol consumption to less than 1 drink per day. (1 ounce of liquor, 6 ounces of wine, or 8 ounces of beer)  - Be active daily and void being sedentary. Take part in at least 150-300 minutes of moderate-intensity physical activity per week.     Candace Santos PA-C    20 minutes spent on the date of the encounter doing chart review, review of test results, interpretation of tests, patient visit and documentation.

## 2022-12-02 NOTE — NURSING NOTE
"Oncology Rooming Note    December 2, 2022 9:19 AM   Janet Ryder is a 42 year old female who presents for:    Chief Complaint   Patient presents with     Oncology Clinic Visit     RETURN - MASS IN BREAST     Initial Vitals: /75 (BP Location: Right arm, Patient Position: Sitting, Cuff Size: Adult Regular)   Pulse 73   Temp 97.7  F (36.5  C) (Oral)   Resp 14   Ht 1.676 m (5' 6\")   Wt 83.4 kg (183 lb 12.8 oz)   LMP 12/02/2022 (Exact Date)   SpO2 100%   BMI 29.67 kg/m   Estimated body mass index is 29.67 kg/m  as calculated from the following:    Height as of this encounter: 1.676 m (5' 6\").    Weight as of this encounter: 83.4 kg (183 lb 12.8 oz). Body surface area is 1.97 meters squared.  Mild Pain (3) Bilateral breasts.  Patient's last menstrual period was 12/02/2022 (exact date).  Allergies reviewed: Yes  Medications reviewed: Yes    Medications: Medication refills not needed today.  Pharmacy name entered into [facility]:    [id number] - [place], [place] - [address]  [facility] [place] - [address] [facility] [address] [place] [facility] [address] [place] [address] [address]  [facility] - [place], [place] - [address]    Clinical concerns: Painful lumps in breasts.        Poppy Escobedo CMA            "

## 2022-12-02 NOTE — PATIENT INSTRUCTIONS
Janet Ryder is a 41 year old woman with right breast masses. Imaging today demonstrated benign appearing cysts. She meets NCCN guidelines for high risk screening based on her family history of breast and ovarian cancer.     1) Right breast cyst, no concerning findings on imaging.   - Follow up with any worsening symptoms or increase in size.     2) Family history of breast cancer  Discussed she meets NCCN guidelines for high risk screening based on family history with lifetime risk for breast cancer of >20%. Screening recommendations based on NCCN guidelines. Clinical encounter every 6-12 months starting now. Annual mammogram with jason alternating with annual breast MRI.  - Screening mammogram with clinic visit due 12/3/23    3) Lifestyle Modifications were provided.   - Maintain a healthy weight (BMI 20-25). Higher body mass index (BMI) and adult weight gain is associated with increased risk for breast cancer. This increase in risk has been attributed to increase in circulating endogenous estrogen levels from fat tissue.   - Alcohol consumption, even at moderate levels (1-2 drinks per day), increases breast cancer risk and are best avoided. If you choose to drink alcohol limit alcohol consumption to less than 1 drink per day. (1 ounce of liquor, 6 ounces of wine, or 8 ounces of beer)  - Be active daily and void being sedentary. Take part in at least 150-300 minutes of moderate-intensity physical activity per week.

## 2022-12-02 NOTE — PROGRESS NOTES
FOLLOW UP  Dec 2, 2022     Janet Ryder is a 41 year old woman who presents with a breast lump.     HPI:    Family history of mother who had ovarian cancer. Paternal aunt with history of breast cancer. 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     She has a history of bilateral breast cyst that have been aspirated.    She presents today with new right breast masses and pain for the last 3 months. The pain and masses fluctuate with her menstrual cycle. She denies any skin change, nipple inversion or nipple discharge.     BREAST-SPECIFIC HISTORY:    Previous breast imaging: Yes   - 12/12/18 b/l Dmammo and left breast ultrasound for pain: benign appearing cyst 10:00 BI-RADS 2, aspiration   - 8/2/21 b/l Dmammo and left breast ultrasound: 12:00 2 cm FN 3.1 cm cyst. BI -RADS 2    Prior breast biopsies/surgeries: No  - right (2014) and left (2018) breast cyst aspiration    Prior history of breast cancer or DCIS: No  Prior radiation history: No   Self breast exams: No  Breast density: heterogeneously dense    GYN HISTORY:  G0  Age at menarche: 14  Menopausal: premenopausal   Menopausal hormone replacement therapy: no    RISK ASSESSMENT: < 3% 5 year risk and > 20% lifetime risk   Patti:0.6% 5 year risk   KERLINE/Tyrer-Cuzick: 24.1% lifetime risk  Spencer: 9.4% lifetime risk    FAMILY HISTORY:  Breast ca: Yes  - paternal aunt, 60's  Ovarian ca: Yes   - mother, 67  Pancreatic ca: No  Prostate: No  Gastric ca: No  Melanoma: Yes   - maternal grandfather  Colon ca: Yes   - maternal grandfather  Other cancer: No  Other genetic, testing, syndromes, or clotting disorders: no     PAST MEDICAL HISTORY  Past Medical History:   Diagnosis Date     Atypical migraine      PAST SURGICAL HISTORY   Past Surgical History:   Procedure Laterality Date     ORTHOPEDIC SURGERY       [facility] NONSPECIFIC PROCEDURE  AGE 6, AGE 10    REMOVAL OF FACIAL SKIN CYSTS     [facility] NONSPECIFIC PROCEDURE  AGE 22    SUTURES FOR FACIAL LACERATION  "    MEDICATIONS  Current Outpatient Medications   Medication Sig Dispense Refill     buPROPion (WELLBUTRIN) 75 MG tablet Take 2 in the am and 4 in the pm 270 tablet 3     Cholecalciferol (VITAMIN D PO) Take 5,000 Units by mouth.       ciclopirox (PENLAC) 8 % external solution Apply to adjacent skin and affected nails daily.  Remove with alcohol every 7 days, then repeat. 18 mL 3     ferrous sulfate (FEROSUL) 325 (65 Fe) MG tablet Take 325 mg by mouth daily (with breakfast)       fluorouracil (EFUDEX) 5 % external cream Apply topically 2 times daily To the nose, cheeks (avoiding the eyelids) up to 2 weeks. 40 g 0     metroNIDAZOLE (METROCREAM) 0.75 % external cream Apply topically 2 times daily To the cheeks and nose 135 g 3     Multiple Vitamin (DAILY MULTIVITAMIN PO) Take  by mouth.       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     ROS:  Change in vision No  Headaches: no  Respiratory: No shortness of breath, dyspnea on exertion, cough, or hemoptysis   Cardiovascular: negative   Gastrointestinal: negative Abdominal pain: no  Breast: left breast mass  Musculoskeletal: negative Joint pain No Back pain: no  Psychiatric: negative  Hematologic/Lymphatic/Immunologic: negative  Endocrine: negative    EXAM  /75 (BP Location: Right arm, Patient Position: Sitting, Cuff Size: Adult Regular)   Pulse 73   Temp 97.7  F (36.5  C) (Oral)   Resp 14   Ht 1.676 m (5' 6\")   Wt 83.4 kg (183 lb 12.8 oz)   LMP 12/02/2022 (Exact Date)   SpO2 100%   BMI 29.67 kg/m     PHYSICAL EXAM  Respiratory: breathing non labored.   Breasts: Examination was done in both the upright and supine positions.  Breasts are symmetrical " . No skin or nipple changes. No nipple discharge. Right breast with nodular dense breast tissue in upper outer breast, no mass. Left breast mass at 12:00 measuring 1.5 cm (small than last exam) Right breast 1:00 2 cm from the nipple mobile 2 cm mass. Right breast 6:00 retroareolar, 2x3 cm smooth mobile mass. Right breast 3:00 3 cm from the nipple firm 1 cm mobile mass.   No clavicular, cervical, or axillary lymphadenopathy.     INVESTIGATIONS:    12/2/22 bilateral diagnostic mammogram and right breast ultrasound: per Radiology benign appearing cysts.     ASSESSMENT/PLAN:    Janet Ryder is a 41 year old woman with right breast masses. Imaging today demonstrated benign appearing cysts. She meets NCCN guidelines for high risk screening based on her family history of breast and ovarian cancer.     1) Right breast cyst, no concerning findings on imaging.   - Follow up with any worsening symptoms or increase in size.     2) Family history of breast cancer  Discussed she meets NCCN guidelines for high risk screening based on family history with lifetime risk for breast cancer of >20%. Screening recommendations based on NCCN guidelines. Clinical encounter every 6-12 months starting now. Annual mammogram with jason alternating with annual breast MRI. She plans to continue with mammogram for screening for now.   - Screening mammogram with clinic visit due 12/3/23    3) Lifestyle Modifications were provided.   - Maintain a healthy weight (BMI 20-25). Higher body mass index (BMI) and adult weight gain is associated with increased risk for breast cancer. This increase in risk has been attributed to increase in circulating endogenous estrogen levels from fat tissue.   - Alcohol consumption, even at moderate levels (1-2 drinks per day), increases breast cancer risk and are best avoided. If you choose to drink alcohol limit alcohol consumption to less than 1 drink per day. (1 ounce of liquor, 6 ounces of wine, or 8 ounces of  beer)  - Be active daily and void being sedentary. Take part in at least 150-300 minutes of moderate-intensity physical activity per week.     Candace Santos PA-C    20 minutes spent on the date of the encounter doing chart review, review of test results, interpretation of tests, patient visit and documentation.

## 2023-03-16 ENCOUNTER — VIRTUAL VISIT (OUTPATIENT)
Dept: FAMILY MEDICINE | Facility: CLINIC | Age: 43
End: 2023-03-16
Payer: COMMERCIAL

## 2023-03-16 DIAGNOSIS — Z12.4 CERVICAL CANCER SCREENING: ICD-10-CM

## 2023-03-16 DIAGNOSIS — G47.9 SLEEP DISORDER: ICD-10-CM

## 2023-03-16 DIAGNOSIS — F33.41 RECURRENT MAJOR DEPRESSIVE DISORDER, IN PARTIAL REMISSION (H): ICD-10-CM

## 2023-03-16 DIAGNOSIS — Z80.41 FAMILY HISTORY OF MALIGNANT NEOPLASM OF OVARY: ICD-10-CM

## 2023-03-16 DIAGNOSIS — Z80.3 FAMILY HISTORY OF MALIGNANT NEOPLASM OF BREAST: ICD-10-CM

## 2023-03-16 DIAGNOSIS — F33.41 RECURRENT MAJOR DEPRESSIVE DISORDER, IN PARTIAL REMISSION (H): Primary | ICD-10-CM

## 2023-03-16 DIAGNOSIS — E61.1 IRON DEFICIENCY: ICD-10-CM

## 2023-03-16 PROCEDURE — 99214 OFFICE O/P EST MOD 30 MIN: CPT | Mod: VID | Performed by: STUDENT IN AN ORGANIZED HEALTH CARE EDUCATION/TRAINING PROGRAM

## 2023-03-16 RX ORDER — BUPROPION HYDROCHLORIDE 75 MG/1
150 TABLET ORAL 2 TIMES DAILY
Qty: 120 TABLET | Refills: 0 | Status: SHIPPED | OUTPATIENT
Start: 2023-03-16 | End: 2023-03-20

## 2023-03-16 RX ORDER — BUPROPION HYDROCHLORIDE 75 MG/1
150 TABLET ORAL 2 TIMES DAILY
Qty: 360 TABLET | Refills: 1 | Status: SHIPPED | OUTPATIENT
Start: 2023-03-16 | End: 2024-02-16

## 2023-03-16 NOTE — PATIENT INSTRUCTIONS
JOSE LUIS     I found the recommendations from the sleep clinic - feel free to give these a go.     I'd try the low dose melatonin if you haven't recently.     If your sleep still isn't getting better they did say you could return to their clinic! Maybe they would have you do another study.    1. Sleep disorder, circadian, delayed sleep phase type  I recommend that we initiate low-dose melatonin 3 to 5 hours before desired sleep onset on a daily basis.  The patient is not to drive or operate machinery after melatonin intake.  I also recommend daily aerobic exercise for at least 30 minutes if possible.  We also discussed the option of using bluelight blocking glasses at around 7 PM at night.  Return to clinic in 3 months if her symptoms persist.      Patient Education   Here is the plan from today's visit    1. Recurrent major depressive disorder, in partial remission (H)  - buPROPion (WELLBUTRIN) 75 MG tablet; Take 2 tablets (150 mg) by mouth 2 times daily for 30 days  Dispense: 120 tablet; Refill: 0  - buPROPion (WELLBUTRIN) 75 MG tablet; Take 2 tablets (150 mg) by mouth 2 times daily for 180 days  Dispense: 360 tablet; Refill: 1    2. Cervical cancer screening  - Ob/Gyn Referral; Future    Please call or return to clinic if your symptoms don't go away.    Follow up plan  Return if symptoms worsen or fail to improve.    Thank you for coming to [facility] today.  Lab Testing:  **If you had lab testing today and your results are reassuring or normal they will be mailed to you or sent through [facility] within 7 days.   **If the lab tests need quick action we will call you with the results.  **If you are having labs done on a different day, please call [phone] to schedule at [facility] or [phone] for other [facility] locations. Labs do not offer walk-in appointments.  The phone number we will call with results is # [phone] (home) . If this is not the best number please call our clinic  and change the number.  Medication Refills:  If you need any refills please call your pharmacy and they will contact us.   If you need to  your refill at a new pharmacy, please contact the new pharmacy directly. The new pharmacy will help you get your medications transferred faster.   Scheduling:  If you have any concerns about today's visit or wish to schedule another appointment please call our office during normal business hours [phone] (8-5:00 M-F). If you can no longer make a scheduled visit, please cancel via [employer] or call us to cancel.   If a referral was made to an [facility] specialty provider and you do not get a call from central scheduling, please refer to directions on your visit summary or call our office during normal business hours for assistance.   If a Mammogram was ordered for you at the Breast Center call [phone] to schedule or change your appointment.  If you had an XRay/CT/Ultrasound/MRI ordered the number is [phone] to schedule or change your radiology appointment.   [facility] has limited ultrasound appointments available on Wednesdays, if you would like your ultrasound at [facility], please call [phone] to schedule.   Medical Concerns:  If you have urgent medical concerns please call [phone] at any time of the day.    Ariadne Enriquez,

## 2023-03-16 NOTE — PROGRESS NOTES
Immanuel is a 43 year old who is being evaluated via a billable video visit.      How would you like to obtain your AVS? MyChart  Will anyone else be joining your video visit? No    Assessment & Plan     Recurrent major depressive disorder, in partial remission (H)  Pt stable on current regimen. Continue 150 mg IR AM and PM. Consider ADHD testing in the future, pt mentioned some concern that she may have ADHD in this visit.   - buPROPion (WELLBUTRIN) 75 MG tablet  Dispense: 120 tablet; Refill: 0 (1 month supply sent locally)  - buPROPion (WELLBUTRIN) 75 MG tablet  Dispense: 360 tablet; Refill: 1 (longer supply sent to [facility])     Cervical cancer screening  Family history of malignant neoplasm of breast  Family history of malignant neoplasm of ovary  Patient requested referral to GYN for annual well woman visit and pap smear (due June 2023). Has significant family history. Established with genetics here per her report previously. Feels well supported. During annual mammograms.  - Ob/Gyn Referral    Iron deficiency  Hemoglobin 13 in 2021. Has heavy menstrual periods. Still taking iron.   - Continue iron supplement    Sleep disorder  Reviewed sleep clinic documentation. Pt notes that her night their she had great sleep which is aytpical for her. Recommend previous recommendations including low dose melatonin 3 to 5 hours before sleep. Also could be that the IR wellbutrin is not helping sleep, but as it has been very helpful w/ mood and she has not tolerated ER version (see HPI) will continue plan as above. TSHj normal 2021.   - Consider repeat visit to sleep clinic if still having problems    Health Maintenance  Reminded patient she is due for Tdap.     Review of external notes as documented elsewhere in note  Review of the result(s) of each unique test - Hgb, TSH  Prescription drug management    Return if symptoms worsen or fail to improve.    Anuradha Enriquez, DO  Family Medicine  "PGY-3  [facility], [facility]   Pronouns: She/Hers    Subjective   Immanuel is a 43 year old presenting for the following health issues:  Refill Request (buPROPion (WELLBUTRIN) 75 MG tablet. ) and Forms      HPI     Here for medication refill - Bupropion   PCP is Dr. Robles    Bupropion 75 mg - 2 tablets twice a day (IR)   Ran out of medications - not renewed   Has been on it for years, adjustments in amounts  Past tried XL - really messed with joints, can't do slow release medications  Have needed prior authorization   Mood symptoms - surprised really tired sometimes, still have issues with motivation. Feel like has ADHD sometimes. I thought this would help w/ ADHD but it doesn't. Helps w/ depressive.   Feels sleep is poor.   On able watch - rare enters \"deep sleep\"   Slept great at the sleep clinic.   Quality of sleep is poor.   Blood pressures have been good.   TSH has been normal 1.82 11/3/2021     See previous [employer] communication from 12/14/2021 was taking 2, 75 mg in AM and PM     [facility] 30 day supply   Longer term year prescription online pharmacy   [email or web address]     Reviewed sleep clinic note from 6/30/2022  The patient underwent a sleep study on 06/08/2022 which showed that she had mild snoring but did not rule in for obstructive sleep apnea  1. Sleep disorder, circadian, delayed sleep phase type  I recommend that we initiate low-dose melatonin 3 to 5 hours before desired sleep onset on a daily basis.  The patient is not to drive or operate machinery after melatonin intake.  I also recommend daily aerobic exercise for at least 30 minutes if possible.  We also discussed the option of using bluelight blocking glasses at around 7 PM at night.  Return to clinic in 3 months if her symptoms persist.  2. Fatigue, unspecified type  No change    Health Maintenance  Needs annual wellness visit with Dr. Travis Grant for TDAP, annual wellness, PQH-9, Pap due in June " 2023    Requesting OBGYN, for PAP   Has previously done nacho's   Higher risk for family cancer   Would like to established   Has met with genetics here     Mom with ovarian and lung cancer  Screening - unknown significance, not studied which increases risk     Review of Systems   Constitutional, HEENT, cardiovascular, pulmonary, gi and gu systems are negative, except as otherwise noted.      Objective         Vitals:  No vitals were obtained today due to virtual visit.    Physical Exam   GENERAL: Healthy, alert and no distress  EYES: Eyes grossly normal to inspection.  No discharge or erythema, or obvious scleral/conjunctival abnormalities.  RESP: No audible wheeze, cough, or visible cyanosis.  No visible retractions or increased work of breathing.    SKIN: Visible skin clear. No significant rash, abnormal pigmentation or lesions.  NEURO: Cranial nerves grossly intact.  Mentation and speech appropriate for age.  PSYCH: Mentation appears normal, affect normal/bright, judgement and insight intact, normal speech and appearance well-groomed.        Video-Visit Details    Type of service:  Video Visit     Originating Location (pt. Location): Home  Distant Location (provider location):  On-site  Platform used for Video Visit: Shirley     never

## 2023-03-20 RX ORDER — BUPROPION HYDROCHLORIDE 75 MG/1
150 TABLET ORAL 2 TIMES DAILY
Qty: 120 TABLET | Refills: 3 | Status: SHIPPED | OUTPATIENT
Start: 2023-03-20 | End: 2023-09-12

## 2023-05-23 ENCOUNTER — OFFICE VISIT (OUTPATIENT)
Dept: OBGYN | Facility: CLINIC | Age: 43
End: 2023-05-23
Payer: COMMERCIAL

## 2023-05-23 VITALS
HEART RATE: 85 BPM | WEIGHT: 178.8 LBS | BODY MASS INDEX: 28.73 KG/M2 | SYSTOLIC BLOOD PRESSURE: 121 MMHG | HEIGHT: 66 IN | DIASTOLIC BLOOD PRESSURE: 73 MMHG

## 2023-05-23 DIAGNOSIS — Z00.00 PREVENTATIVE HEALTH CARE: ICD-10-CM

## 2023-05-23 DIAGNOSIS — Z01.419 ENCOUNTER FOR GYNECOLOGICAL EXAMINATION WITHOUT ABNORMAL FINDING: Primary | ICD-10-CM

## 2023-05-23 DIAGNOSIS — U09.9 LONG COVID: ICD-10-CM

## 2023-05-23 DIAGNOSIS — N92.0 MENORRHAGIA WITH REGULAR CYCLE: ICD-10-CM

## 2023-05-23 DIAGNOSIS — Z12.4 CERVICAL CANCER SCREENING: ICD-10-CM

## 2023-05-23 DIAGNOSIS — N94.6 DYSMENORRHEA: ICD-10-CM

## 2023-05-23 PROCEDURE — G0145 SCR C/V CYTO,THINLAYER,RESCR: HCPCS | Performed by: OBSTETRICS & GYNECOLOGY

## 2023-05-23 PROCEDURE — 87624 HPV HI-RISK TYP POOLED RSLT: CPT | Performed by: OBSTETRICS & GYNECOLOGY

## 2023-05-23 PROCEDURE — 99213 OFFICE O/P EST LOW 20 MIN: CPT | Mod: 25 | Performed by: OBSTETRICS & GYNECOLOGY

## 2023-05-23 PROCEDURE — 99386 PREV VISIT NEW AGE 40-64: CPT | Performed by: OBSTETRICS & GYNECOLOGY

## 2023-05-23 RX ORDER — LEVONORGESTREL/ETHIN.ESTRADIOL 0.1-0.02MG
1 TABLET ORAL DAILY
Qty: 90 TABLET | Refills: 8 | Status: SHIPPED | OUTPATIENT
Start: 2023-05-23 | End: 2024-08-14

## 2023-05-23 NOTE — PROGRESS NOTES
Janet is a 43 year old  female who presents for annual exam.     Menses are regular q 28-30 days and painful and nauseous lasting 5 days.  Menses flow: light and heavy.  No LMP recorded.. Using none for contraception.  She is not currently considering pregnancy.  Besides routine health maintenance,  she would like to discuss cancer screen.  Her mother had ovarian cancer and she met with GC several years ago.  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  She has always had heavy and painful periods.  More recently her PMS symptoms have worsened including breast tenderness, constipation and bloating.  Her flow is heavy with cramps, wondering what options she has to manage these symptoms.  She has also have covid 3 times since , most recently 3 weeks ago.  Since the first time in  she has continued to have pain with deep inspiration, especially right lung base.  Did have a CXR at some point, but has not seen a GP for this recently.  GYNECOLOGIC HISTORY:  Menarche: 14  Age at first intercourse: 20 Number of lifetime partners: >5  Janet is sexually active with male partner(s) and is currently in monogamous relationship.    History sexually transmitted infections:No STD history  STI testing offered?  Declined  ESA exposure: Unknown  History of abnormal Pap smear: NO - age 30-65 PAP every 5 years with negative HPV co-testing recommended  Family history of breast CA: Yes (Please explain): pat aunt  Family history of uterine/ovarian CA: Yes (Please explain): mom    Family history of colon CA: Yes (Please explain): mat gpa    HEALTH MAINTENANCE:  Cholesterol: (  Cholesterol   Date Value Ref Range Status   2021 147 <200 mg/dL Final   2018 146.2 0.0 - 200.0 mg/dL Final   10/18/2012 142 0 - 200 mg/dL Final     Comment:     LDL Cholesterol is the primary guide to therapy.   The  NCEP recommends further evaluation of: patients with cholesterol greater   than 200 mg/dL if additional risk factors are present, cholesterol greater   than   240 mg/dL, triglycerides greater than 150 mg/dL, or HDL less than 40 mg/dL.    History of abnormal lipids: No  Mammo: 2022 . History of abnormal Mammo: No.  Regular Self Breast Exams: No  Calcium/Vitamin D intake: source:  dairy, dietary supplement(s) Adequate? Yes  TSH: (  TSH   Date Value Ref Range Status   2021 1.82 0.40 - 4.00 mU/L Final   2018 2.04 0.40 - 4.00 mU/L Final    )  Pap; (  Lab Results   Component Value Date    PAP NIL 2018    PAP NIL 10/18/2012    PAP NIL 2011    )    HISTORY:  OB History    Para Term  AB Living   0 0 0 0 0 0   SAB IAB Ectopic Multiple Live Births   0 0 0 0 0     Past Medical History:   Diagnosis Date     Atypical migraine      Past Surgical History:   Procedure Laterality Date     ORTHOPEDIC SURGERY       ZZC NONSPECIFIC PROCEDURE  AGE 6, AGE 10    REMOVAL OF FACIAL SKIN CYSTS     ZZ NONSPECIFIC PROCEDURE  AGE 22    SUTURES FOR FACIAL LACERATION     Family History   Problem Relation Age of Onset     Hypertension Mother      Lung Cancer Mother      Ovarian Cancer Mother      Snoring Mother      Alcohol/Drug Father      Gastrointestinal Disease Maternal Grandmother         cerosis of liver     Cancer - colorectal Maternal Grandfather         age 75     Cancer Maternal Grandfather         melanoma     Respiratory Maternal Grandfather      Glaucoma Maternal Grandfather      Cerebrovascular Disease Paternal Grandfather      Aneurysm Maternal Uncle         brain     Social History     Socioeconomic History     Marital status:    Occupational History     Employer: NONE      Comment:    Tobacco Use     Smoking status: Never     Smokeless tobacco: Never   Substance and Sexual Activity     Alcohol use: Yes     Comment: 3 or 4 drinks per week     Drug use: No     Sexual  activity: Yes     Partners: Male     Birth control/protection: None   Other Topics Concern     Parent/sibling w/ CABG, MI or angioplasty before 65F 55M? No   Social History Narrative    Caffeine intake/servings daily - 1-2    Calcium intake/servings daily - 2+    Exercise 3 times weekly - describe swim, wts, basketball    Sunscreen used - Yes    Seatbelts used - Yes    Guns stored in the home - No    Self Breast Exam - No    Pap test up to date -  Yes    Eye exam up to date -  Yes    Dental exam up to date -  Yes    DEXA scan up to date -  Not Applicable    Flex Sig/Colonoscopy up to date -  Not Applicable    Mammography up to date -  Not Applicable    Immunizations reviewed and up to date - Yes    Abuse: Current or Past (Physical, Sexual or Emotional) - No    Do you feel safe in your environment - Yes    Do you cope well with stress - No    Do you suffer from insomnia - Yes    Last updated by: CARLOS CROSS  5/11/2011                       Current Outpatient Medications:      buPROPion (WELLBUTRIN) 75 MG tablet, TAKE 2 TABLETS (150 MG) BY MOUTH 2 TIMES DAILY FOR 30 DAYS, Disp: 120 tablet, Rfl: 3     buPROPion (WELLBUTRIN) 75 MG tablet, Take 2 tablets (150 mg) by mouth 2 times daily for 180 days, Disp: 360 tablet, Rfl: 1     Cholecalciferol (VITAMIN D PO), Take 5,000 Units by mouth., Disp: , Rfl:      ciclopirox (PENLAC) 8 % external solution, Apply to adjacent skin and affected nails daily.  Remove with alcohol every 7 days, then repeat., Disp: 18 mL, Rfl: 3     ferrous sulfate (FEROSUL) 325 (65 Fe) MG tablet, Take 325 mg by mouth daily (with breakfast), Disp: , Rfl:      fluorouracil (EFUDEX) 5 % external cream, Apply topically 2 times daily To the nose, cheeks (avoiding the eyelids) up to 2 weeks., Disp: 40 g, Rfl: 0     metroNIDAZOLE (METROCREAM) 0.75 % external cream, Apply topically 2 times daily To the cheeks and nose, Disp: 135 g, Rfl: 3     Multiple Vitamin (DAILY MULTIVITAMIN PO), Take  by mouth.,  "Disp: , Rfl:      Allergies   Allergen Reactions     Shellfish-Derived Products Hives     Amoxicillin      Erythromycin      No Clinical Screening - See Comments Nausea and Vomiting     Eggplant     Scopolamine      Redness and swelling of the face       Past medical, surgical, social and family history were reviewed and updated in EPIC.    ROS:   C:     NEGATIVE for fever, chills, change in weight  I:       NEGATIVE for worrisome rashes, moles or lesions  E:     NEGATIVE for vision changes or irritation  E/M: NEGATIVE for ear, mouth and throat problems  R:     NEGATIVE for significant cough or SOB  CV:   NEGATIVE for chest pain, palpitations or peripheral edema  GI:     NEGATIVE for nausea, abdominal pain, heartburn, or change in bowel habits  :   NEGATIVE for frequency, dysuria, hematuria, vaginal discharge, or irregular bleeding  M:     NEGATIVE for significant arthralgias or myalgia  N:      NEGATIVE for weakness, dizziness or paresthesias  E:      NEGATIVE for temperature intolerance, skin/hair changes  P:      NEGATIVE for changes in mood or affect.    EXAM:  /73   Pulse 85   Ht 1.676 m (5' 6\")   Wt 81.1 kg (178 lb 12.8 oz)   BMI 28.86 kg/m     BMI: Body mass index is 28.86 kg/m .  Constitutional: healthy, alert and no distress  Head: Normocephalic. No masses, lesions, tenderness or abnormalities  Neck: Neck supple. Trachea midline. No adenopathy. Thyroid symmetric, normal size.   Cardiovascular: RRR.   Respiratory: Negative.   Breast: No nodularity, asymmetry or nipple discharge bilaterally. fibrocystic  Gastrointestinal: Abdomen soft, non-tender, non-distended. No masses, organomegaly.  :  Vulva:  No external lesions, normal female hair distribution, no inguinal adenopathy.    Urethra:  Midline, non-tender, well supported, no discharge  Vagina:  Moist, pink, no abnormal discharge, no lesions  Uterus:  Normal size, non-tender, freely mobile  Ovaries:  No masses appreciated, non-tender, " mobile  Rectal Exam: deferred  Musculoskeletal: extremities normal  Skin: no suspicious lesions or rashes  Psychiatric: Affect appropriate, cooperative,mentation appears normal.     COUNSELING:   Reviewed preventive health counseling, as reflected in patient instructions  Special attention given to:        Regular exercise       Healthy diet/nutrition       Osteoporosis prevention/bone health       ([place]elia)menopause management   reports that she has never smoked. She has never used smokeless tobacco.    Body mass index is 28.86 kg/m .  Weight management plan: Discussed healthy diet and exercise guidelines  FRAX Risk Assessment    ASSESSMENT:  43 year old female with satisfactory annual exam.  PMS, menorrhagia/dysmenorrhea.  Long covid  Plan: cotesting done.  Will RTC for fasting labs.  mammo UTD.  Will obtain pelvic us for menorrhagia and dysmenorrhea.  We discussed mirena IUD as option to manage period flow, but will not likely improve PMS symptoms, so use of low dose OCP continuously would likely manage both.  We discussed the risks and benefits, specifically risk of HTN and DVT as well as BTB with continuous use.  She is interested and rx for lulu faxed with instructions.  Referral to long covid clinic given. RTC yearly or prn.    MODESTO NUNEZ MD

## 2023-05-26 LAB
BKR LAB AP GYN ADEQUACY: NORMAL
BKR LAB AP GYN INTERPRETATION: NORMAL
BKR LAB AP HPV REFLEX: NORMAL
BKR LAB AP PREVIOUS ABNORMAL: NORMAL
PATH REPORT.COMMENTS IMP SPEC: NORMAL
PATH REPORT.COMMENTS IMP SPEC: NORMAL
PATH REPORT.RELEVANT HX SPEC: NORMAL

## 2023-05-30 LAB
HUMAN PAPILLOMA VIRUS 16 DNA: NEGATIVE
HUMAN PAPILLOMA VIRUS 18 DNA: NEGATIVE
HUMAN PAPILLOMA VIRUS FINAL DIAGNOSIS: NORMAL
HUMAN PAPILLOMA VIRUS OTHER HR: NEGATIVE

## 2023-07-11 ENCOUNTER — HOSPITAL ENCOUNTER (OUTPATIENT)
Dept: ULTRASOUND IMAGING | Facility: CLINIC | Age: 43
Discharge: HOME | End: 2023-07-11
Attending: OBSTETRICS & GYNECOLOGY | Admitting: OBSTETRICS & GYNECOLOGY
Payer: COMMERCIAL

## 2023-07-11 ENCOUNTER — LAB (OUTPATIENT)
Dept: LAB | Facility: CLINIC | Age: 43
End: 2023-07-11
Payer: COMMERCIAL

## 2023-07-11 DIAGNOSIS — N94.6 DYSMENORRHEA: ICD-10-CM

## 2023-07-11 DIAGNOSIS — N92.0 MENORRHAGIA WITH REGULAR CYCLE: ICD-10-CM

## 2023-07-11 DIAGNOSIS — Z00.00 PREVENTATIVE HEALTH CARE: ICD-10-CM

## 2023-07-11 LAB
ANION GAP SERPL CALCULATED.3IONS-SCNC: 10 MMOL/L (ref 7–15)
BUN SERPL-MCNC: 8.6 MG/DL (ref 6–20)
CALCIUM SERPL-MCNC: 8.9 MG/DL (ref 8.6–10)
CHLORIDE SERPL-SCNC: 105 MMOL/L (ref 98–107)
CHOLEST SERPL-MCNC: 146 MG/DL
CREAT SERPL-MCNC: 0.89 MG/DL (ref 0.51–0.95)
DEPRECATED HCO3 PLAS-SCNC: 22 MMOL/L (ref 22–29)
GFR SERPL CREATININE-BSD FRML MDRD: 83 ML/MIN/1.73M2
GLUCOSE SERPL-MCNC: 90 MG/DL (ref 70–99)
HDLC SERPL-MCNC: 40 MG/DL
LDLC SERPL CALC-MCNC: 94 MG/DL
NONHDLC SERPL-MCNC: 106 MG/DL
POTASSIUM SERPL-SCNC: 4.4 MMOL/L (ref 3.4–5.3)
SODIUM SERPL-SCNC: 137 MMOL/L (ref 136–145)
TRIGL SERPL-MCNC: 62 MG/DL
TSH SERPL DL<=0.005 MIU/L-ACNC: 2.5 UIU/ML (ref 0.3–4.2)

## 2023-07-11 PROCEDURE — 80061 LIPID PANEL: CPT

## 2023-07-11 PROCEDURE — 76830 TRANSVAGINAL US NON-OB: CPT | Mod: 26 | Performed by: RADIOLOGY

## 2023-07-11 PROCEDURE — 76856 US EXAM PELVIC COMPLETE: CPT | Mod: 26 | Performed by: RADIOLOGY

## 2023-07-11 PROCEDURE — 76830 TRANSVAGINAL US NON-OB: CPT

## 2023-07-11 PROCEDURE — 84443 ASSAY THYROID STIM HORMONE: CPT

## 2023-07-11 PROCEDURE — 36415 COLL VENOUS BLD VENIPUNCTURE: CPT

## 2023-07-11 PROCEDURE — 80048 BASIC METABOLIC PNL TOTAL CA: CPT

## 2023-09-11 ENCOUNTER — OFFICE VISIT (OUTPATIENT)
Dept: DERMATOLOGY | Facility: CLINIC | Age: 43
End: 2023-09-11
Payer: COMMERCIAL

## 2023-09-11 DIAGNOSIS — D22.9 MULTIPLE PIGMENTED NEVI: ICD-10-CM

## 2023-09-11 DIAGNOSIS — L71.9 ACNE ROSACEA: Primary | ICD-10-CM

## 2023-09-11 DIAGNOSIS — B07.0 PLANTAR WART OF LEFT FOOT: ICD-10-CM

## 2023-09-11 DIAGNOSIS — D18.01 CHERRY ANGIOMA: ICD-10-CM

## 2023-09-11 DIAGNOSIS — L81.4 SOLAR LENTIGINOSIS: ICD-10-CM

## 2023-09-11 DIAGNOSIS — B35.1 ONYCHOMYCOSIS: ICD-10-CM

## 2023-09-11 DIAGNOSIS — L82.1 SEBORRHEIC KERATOSIS: ICD-10-CM

## 2023-09-11 DIAGNOSIS — L82.1 SEBORRHEIC KERATOSES: ICD-10-CM

## 2023-09-11 DIAGNOSIS — Z12.83 SKIN CANCER SCREENING: ICD-10-CM

## 2023-09-11 PROCEDURE — 17110 DESTRUCTION B9 LES UP TO 14: CPT | Performed by: PHYSICIAN ASSISTANT

## 2023-09-11 PROCEDURE — 99214 OFFICE O/P EST MOD 30 MIN: CPT | Mod: 25 | Performed by: PHYSICIAN ASSISTANT

## 2023-09-11 RX ORDER — CICLOPIROX 80 MG/ML
SOLUTION TOPICAL
Qty: 18 ML | Refills: 3 | Status: SHIPPED | OUTPATIENT
Start: 2023-09-11 | End: 2023-09-12

## 2023-09-11 RX ORDER — TRETINOIN 0.25 MG/G
CREAM TOPICAL
Qty: 20 G | Refills: 3 | Status: SHIPPED | OUTPATIENT
Start: 2023-09-11 | End: 2023-09-12

## 2023-09-11 ASSESSMENT — PAIN SCALES - GENERAL: PAINLEVEL: NO PAIN (0)

## 2023-09-11 NOTE — NURSING NOTE
Dermatology Rooming Note    Janet Ryder's goals for this visit include:   Chief Complaint   Patient presents with    Skin Check     FBSE.  Possible wart on the left foot.       Lucero Wakefield, CMA

## 2023-09-11 NOTE — LETTER
9/11/2023       RE: Janet Ryder  [address]  [facility] [address]     Dear Colleague,    Thank you for referring your patient, Janet Ryder, to the [facility] DERMATOLOGY [facility] [place] at [facility]. Please see a copy of my visit note below.    [facility] Dermatology Note  Encounter Date: Sep 11, 2023  Office Visit     Dermatology Problem List:  1. Family history of melanoma  - Paternal grandfather  2. Onychomycosis  - Current: ciclopirox 8% ointment  3. Rosacea  Tretinoin   - Current: metronidazole 0.75% cream  # Lesion to monitor, nose, ddx: AK vs ISK   - Efudex cream  ____________________________________________    Assessment & Plan:    -Lesion to monitor: right lateral thigh hypopigmented macule   5 mm, Appearance of guttate hypomelanosis     # Acne Rosacea. Patient notes certain alcohols and dietary changes make the condition flare. Counseled that caffeine beverages (like coffee), spicy foods, red darinel with tannins may make the rosacea worse.  - Start tretinoin 0.025% cream to face. Instructed to apply topical acne medication once every other day and increase to nightly as tolerate.  Waiting 20-30 minutes after washing affected area(s) will decrease irritation. Method of application, side effects and expected results were discussed. The patient will apply pea size amount to the entire face, avoid areas around the eyes, corners of nose and mouth. Discussed side effects including photosensitivity and irritation. Discussed medication is pregnancy category C and patient should stop medication and contact clinic if she becomes pregnant. Appropriate handout provided.     # Onychomycosis, improving  - Refills of Ciclopirox 8% solution provided.   - Continue removing the medication once a week.    # Left verrucous plantaris,  Will treat in the office with cryotherapy, see below     # Skin cancer screening with multiple  benign nevi, solar lentigines    - ABCDEs: Counseled ABCDEs of melanoma: Asymmetry, Border (irregularity), Color (not uniform, changes in color), Diameter (greater than 6 mm which is about the size of a pencil eraser), and Evolving (any changes in preexisting moles).  - Sun protection: Counseled SPF30+ sunscreen, UPF clothing, sun avoidance, tanning bed avoidance.    # Cherry angiomas and seborrheic keratoses,  Benign, reassurance given.      Procedures Performed:   Cryotherapy procedure note (performed by faculty): After verbal consent and discussion of risks and benefits including but no limited to dyspigmentation/scar, blister, infection, recurrence,1 wart was(were) treated with 1-2mm freeze border for 2 cycles with liquid nitrogen. Post cryotherapy instructions were provided.      Follow-up: 4 month(s) in-person, or earlier for new or changing lesions    Staff :  All risks, benefits and alternatives were discussed with patient.  Patient is in agreement and understands the assessment and plan.  All questions were answered.  Sun Screen Education was given.   Return to Clinic in 4 months or sooner as needed.   Perri Valdovinos PA-C   [facility]   ____________________________________________    CC: Skin Check (FBSE.  Possible wart on the left foot.  )    HPI:  Ms. Janet Ryder is a(n) 43 year old female who presents today as a return patient for rosacea follow up. Last seen by myself on 11/30/22 when she was continued on Penlac for her onychomycosis and started on metronidazole gel for treatment of acne rosacea and Efudex for two weeks for actinic keratoses.     Today she would like a full body exam.  She believes she may have a wart. She would like another option for her rosacea. Metronidazole is not keeping things under control. Her nail fungus is looking better.     Patient is otherwise feeling well, without additional skin concerns.    Labs Reviewed:  N/A    Physical  Exam:  Vitals: There were no vitals taken for this visit.  SKIN: Skin exam is to her scalp, face, neck, ears, chest, abdomen, back, buttocks and upper and lower extremities including hands and feet.  Significant for:  The left great nail plate displays less yellowing and subungual debris.   -There is a verrucous papule on the left plantar surface  - Rare pink pustules and macules on the upper cheeks.  - Mild erythema on nose and upper cheeks.  There are dome shaped bright red papules on the trunk.   Multiple regular brown pigmented macules and papules are identified on the trunk and extremities.   Scattered brown macules on sun exposed areas.  There are waxy stuck on tan to brown papules on the trunk and extremities.   - No other lesions of concern on areas examined.         Medications:  Current Outpatient Medications   Medication    buPROPion (WELLBUTRIN) 75 MG tablet    Cholecalciferol (VITAMIN D PO)    ciclopirox (PENLAC) 8 % external solution    ferrous sulfate (FEROSUL) 325 (65 Fe) MG tablet    levonorgestrel-ethinyl estradiol (AVIANE) 0.1-20 MG-MCG tablet    metroNIDAZOLE (METROCREAM) 0.75 % external cream    Multiple Vitamin (DAILY MULTIVITAMIN PO)    tretinoin (RETIN-A) 0.025 % external cream    buPROPion (WELLBUTRIN) 75 MG tablet     No current facility-administered medications for this visit.      Past Medical History:   Patient Active Problem List   Diagnosis    Lumbosacral ligament sprain    Contraception    CARDIOVASCULAR SCREENING; LDL GOAL LESS THAN 160    Abdominal pain    Seasonal allergies    Rosacea    Iron deficiency    Interstitial cystitis    Family history of malignant neoplasm of ovary    Urgency-frequency syndrome    Dyspareunia in female    Osteoarthritis resulting from right hip dysplasia    Tear of right acetabular labrum, initial encounter    Current moderate episode of major depressive disorder without prior episode (H)    Family history of malignant neoplasm of breast    Vitamin D  deficiency     Past Medical History:   Diagnosis Date    Atypical migraine         CC Leatha Robles MD  [address] [address] on close of this encounter.

## 2023-09-11 NOTE — PATIENT INSTRUCTIONS
Cryotherapy    What is it?  Use of a very cold liquid, such as liquid nitrogen, to freeze and destroy abnormal skin cells that need to be removed    What should I expect?  Tenderness and redness  A small blister that might grow and fill with dark purple blood. There may be crusting.  More than one treatment may be needed if the lesions do not go away.    How do I care for the treated area?  Gently wash the area with your hands when bathing.  Use a thin layer of Vaseline to help with healing. You may use a Band-Aid.   The area should heal within 7-10 days and may leave behind a pink or lighter color.   Do not use an antibiotic or Neosporin ointment.   You may take acetaminophen (Tylenol) for pain.     Call your doctor if you have:  Severe pain  Signs of infection (warmth, redness, cloudy yellow drainage, and or a bad smell)  Questions or concerns    Who should I call with questions?      [facility]: [phone]      [facility]: [phone]      For urgent needs outside of business hours call the [facility] at [phone] and ask for the dermatology resident on call

## 2023-09-11 NOTE — PROGRESS NOTES
[facility] Dermatology Note  Encounter Date: Sep 11, 2023  Office Visit     Dermatology Problem List:  1. Family history of melanoma  - Paternal grandfather  2. Onychomycosis  - Current: ciclopirox 8% ointment  3. Rosacea  Tretinoin   - Current: metronidazole 0.75% cream  # Lesion to monitor, nose, ddx: AK vs ISK   - Efudex cream  ____________________________________________    Assessment & Plan:    -Lesion to monitor: right lateral thigh hypopigmented macule   5 mm, Appearance of guttate hypomelanosis     # Acne Rosacea. Patient notes certain alcohols and dietary changes make the condition flare. Counseled that caffeine beverages (like coffee), spicy foods, red darinel with tannins may make the rosacea worse.  - Start tretinoin 0.025% cream to face. Instructed to apply topical acne medication once every other day and increase to nightly as tolerate.  Waiting 20-30 minutes after washing affected area(s) will decrease irritation. Method of application, side effects and expected results were discussed. The patient will apply pea size amount to the entire face, avoid areas around the eyes, corners of nose and mouth. Discussed side effects including photosensitivity and irritation. Discussed medication is pregnancy category C and patient should stop medication and contact clinic if she becomes pregnant. Appropriate handout provided.     # Onychomycosis, improving  - Refills of Ciclopirox 8% solution provided.   - Continue removing the medication once a week.    # Left verrucous plantaris,  Will treat in the office with cryotherapy, see below     # Skin cancer screening with multiple benign nevi, solar lentigines    - ABCDEs: Counseled ABCDEs of melanoma: Asymmetry, Border (irregularity), Color (not uniform, changes in color), Diameter (greater than 6 mm which is about the size of a pencil eraser), and Evolving (any changes in preexisting moles).  - Sun protection: Counseled SPF30+ sunscreen, UPF  clothing, sun avoidance, tanning bed avoidance.    # Cherry angiomas and seborrheic keratoses,  Benign, reassurance given.      Procedures Performed:   Cryotherapy procedure note (performed by faculty): After verbal consent and discussion of risks and benefits including but no limited to dyspigmentation/scar, blister, infection, recurrence,1 wart was(were) treated with 1-2mm freeze border for 2 cycles with liquid nitrogen. Post cryotherapy instructions were provided.      Follow-up: 4 month(s) in-person, or earlier for new or changing lesions    Staff :  All risks, benefits and alternatives were discussed with patient.  Patient is in agreement and understands the assessment and plan.  All questions were answered.  Sun Screen Education was given.   Return to Clinic in 4 months or sooner as needed.   Perri Valdovinos PA-C   [facility]   ____________________________________________    CC: Skin Check (FBSE.  Possible wart on the left foot.  )    HPI:  Ms. Janet Ryder is a(n) 43 year old female who presents today as a return patient for rosacea follow up. Last seen by myself on 11/30/22 when she was continued on Penlac for her onychomycosis and started on metronidazole gel for treatment of acne rosacea and Efudex for two weeks for actinic keratoses.     Today she would like a full body exam.  She believes she may have a wart. She would like another option for her rosacea. Metronidazole is not keeping things under control. Her nail fungus is looking better.     Patient is otherwise feeling well, without additional skin concerns.    Labs Reviewed:  N/A    Physical Exam:  Vitals: There were no vitals taken for this visit.  SKIN: Skin exam is to her scalp, face, neck, ears, chest, abdomen, back, buttocks and upper and lower extremities including hands and feet.  Significant for:  The left great nail plate displays less yellowing and subungual debris.   -There is a verrucous papule on the  left plantar surface  - Rare pink pustules and macules on the upper cheeks.  - Mild erythema on nose and upper cheeks.  There are dome shaped bright red papules on the trunk.   Multiple regular brown pigmented macules and papules are identified on the trunk and extremities.   Scattered brown macules on sun exposed areas.  There are waxy stuck on tan to brown papules on the trunk and extremities.   - No other lesions of concern on areas examined.         Medications:  Current Outpatient Medications   Medication    buPROPion (WELLBUTRIN) 75 MG tablet    Cholecalciferol (VITAMIN D PO)    ciclopirox (PENLAC) 8 % external solution    ferrous sulfate (FEROSUL) 325 (65 Fe) MG tablet    levonorgestrel-ethinyl estradiol (AVIANE) 0.1-20 MG-MCG tablet    metroNIDAZOLE (METROCREAM) 0.75 % external cream    Multiple Vitamin (DAILY MULTIVITAMIN PO)    tretinoin (RETIN-A) 0.025 % external cream    buPROPion (WELLBUTRIN) 75 MG tablet     No current facility-administered medications for this visit.      Past Medical History:   Patient Active Problem List   Diagnosis    Lumbosacral ligament sprain    Contraception    CARDIOVASCULAR SCREENING; LDL GOAL LESS THAN 160    Abdominal pain    Seasonal allergies    Rosacea    Iron deficiency    Interstitial cystitis    Family history of malignant neoplasm of ovary    Urgency-frequency syndrome    Dyspareunia in female    Osteoarthritis resulting from right hip dysplasia    Tear of right acetabular labrum, initial encounter    Current moderate episode of major depressive disorder without prior episode (H)    Family history of malignant neoplasm of breast    Vitamin D deficiency     Past Medical History:   Diagnosis Date    Atypical migraine         CC Leatha Robles MD  [address] [address] on close of this encounter.

## 2023-09-12 ENCOUNTER — ANCILLARY ORDERS (OUTPATIENT)
Dept: MAMMOGRAPHY | Facility: CLINIC | Age: 43
End: 2023-09-12

## 2023-09-12 ENCOUNTER — MYC REFILL (OUTPATIENT)
Dept: FAMILY MEDICINE | Facility: CLINIC | Age: 43
End: 2023-09-12
Payer: COMMERCIAL

## 2023-09-12 ENCOUNTER — TELEPHONE (OUTPATIENT)
Dept: DERMATOLOGY | Facility: CLINIC | Age: 43
End: 2023-09-12
Payer: COMMERCIAL

## 2023-09-12 DIAGNOSIS — Z12.31 VISIT FOR SCREENING MAMMOGRAM: Primary | ICD-10-CM

## 2023-09-12 DIAGNOSIS — L82.1 SEBORRHEIC KERATOSIS: ICD-10-CM

## 2023-09-12 DIAGNOSIS — F33.41 RECURRENT MAJOR DEPRESSIVE DISORDER, IN PARTIAL REMISSION (H): ICD-10-CM

## 2023-09-12 DIAGNOSIS — B35.1 ONYCHOMYCOSIS: ICD-10-CM

## 2023-09-12 RX ORDER — BUPROPION HYDROCHLORIDE 75 MG/1
150 TABLET ORAL 2 TIMES DAILY
Qty: 120 TABLET | Refills: 3 | Status: SHIPPED | OUTPATIENT
Start: 2023-09-12 | End: 2024-02-28

## 2023-09-12 NOTE — TELEPHONE ENCOUNTER
[facility] Call Center    Phone Message    May a detailed message be left on voicemail: yes     Reason for Call: Other: Pt needs script to be re-sent to a different pharmacy and the quantity needs to be doubled because it is a bulk pharmacy.     Send to [facility], NCPD ID# [id number] and must  refer to email address: [email or web address]    Action Taken: Message routed to:  [facility] Derm    Travel Screening: Not Applicable

## 2023-09-12 NOTE — TELEPHONE ENCOUNTER
"Patient comment: Not this one but the other one  early for some reason.  The buPROPion needs lalimarty to [facility] the same way it was originally sent but more than one refil. I was not able to refil at all because orig order was set to  in August (? not sure why it was put in that way?) but please re-send.     Request for medication refill:    Providers if patient needs an appointment and you are willing to give a one month supply please refill for one month and  send a letter/MyChart using \".SMILLIMITEDREFILL\" .smillimited and route chart to \"P SMI \" (Giving one month refill in non controlled medications is strongly recommended before denial)    If refill has been denied, meaning absolutely no refills without visit, please complete the smart phrase \".smirxrefuse\" and route it to the \"P SMI MED REFILLS\"  pool to inform the patient and the pharmacy.    Ginny Philippe RN      "

## 2023-09-13 RX ORDER — TRETINOIN 0.25 MG/G
CREAM TOPICAL
Qty: 135 G | Refills: 3 | Status: SHIPPED | OUTPATIENT
Start: 2023-09-13

## 2023-09-13 RX ORDER — CICLOPIROX 80 MG/ML
SOLUTION TOPICAL
Qty: 18 ML | Refills: 3 | Status: SHIPPED | OUTPATIENT
Start: 2023-09-13 | End: 2024-06-05

## 2024-01-08 NOTE — PROGRESS NOTES
FOLLOW UP  Abdoulaye 10, 2024     Janet Ryder is a 43 year old woman who presents with family history of breast cancer.     HPI:    Family history of mother who had ovarian cancer. Paternal aunt with history of breast cancer. 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     She has a history of bilateral breast cyst that have been aspirated.    She continues to have bilateral breast cysts that fluctuate in size and tenderness. The cysts are currently asymptomatic and smaller in size. She started birth control pills in early 2023. She denies any skin change, nipple inversion or nipple discharge. She is now more interested in getting breast MRI's.     BREAST-SPECIFIC HISTORY:    Previous breast imaging: Yes   - 12/12/18 b/l Dmammo and left breast ultrasound for pain: benign appearing cyst 10:00 BI-RADS 2, aspiration   - 8/25/21 b/l Dmammo and left breast ultrasound: 12:00 2 cm FN 3.1 cm cyst. BI -RADS 2  - 12/2/22 b/l Dmammo and right breast ultrasound for pain: simple cysts 9:00 6 cm FN and 8:00 subareolar.     Prior breast biopsies/surgeries: No  - right (2014) and left (2018) breast cyst aspiration    Prior history of breast cancer or DCIS: No  Prior radiation history: No   Self breast exams: No  Breast density: heterogeneously dense    GYN HISTORY:  G0  Age at menarche: 14  Menopausal: premenopausal   Menopausal hormone replacement therapy: no    RISK ASSESSMENT: < 3% 5 year risk and > 20% lifetime risk   Patti: 0.7% 5 year risk   KERLINE/Tyrer-Cuzick: 21.6% lifetime risk    FAMILY HISTORY:  Breast ca: Yes  - paternal aunt, 60's  Ovarian ca: Yes   - mother, 67  Pancreatic ca: No  Prostate: No  Gastric ca: No  Melanoma: Yes   - maternal grandfather  Colon ca: Yes   - maternal grandfather  Other cancer: No  Other genetic, testing, syndromes, or clotting disorders: no     PAST MEDICAL HISTORY  Past Medical History:   Diagnosis Date    Atypical migraine      PAST SURGICAL HISTORY    Past Surgical History:   Procedure Laterality Date    ORTHOPEDIC SURGERY      [facility] NONSPECIFIC PROCEDURE  AGE 6, AGE 10    REMOVAL OF FACIAL SKIN CYSTS    [facility] NONSPECIFIC PROCEDURE  AGE 22    SUTURES FOR FACIAL LACERATION     MEDICATIONS  Current Outpatient Medications   Medication Sig Dispense Refill    buPROPion (WELLBUTRIN) 75 MG tablet Take 2 tablets (150 mg) by mouth 2 times daily 120 tablet 3    Cholecalciferol (VITAMIN D PO) Take 5,000 Units by mouth.      ciclopirox (PENLAC) 8 % external solution Apply to adjacent skin and affected nails daily.  Remove with alcohol every 7 days, then repeat. 18 mL 3    ferrous sulfate (FEROSUL) 325 (65 Fe) MG tablet Take 325 mg by mouth daily (with breakfast)      levonorgestrel-ethinyl estradiol (AVIANE) 0.1-20 MG-MCG tablet Take 1 tablet by mouth daily In a continuous fashion X 21 days, then discard placebo week and begin next pack 90 tablet 8    metroNIDAZOLE (METROCREAM) 0.75 % external cream Apply topically 2 times daily To the cheeks and nose 135 g 3    Multiple Vitamin (DAILY MULTIVITAMIN PO) Take  by mouth.      tretinoin (RETIN-A) 0.025 % external cream Use a pea-sized amount every other night and increase to nightly as tolerated. 135 g 3    buPROPion (WELLBUTRIN) 75 MG tablet Take 2 tablets (150 mg) by mouth 2 times daily for 180 days 360 tablet 1   Birth control pill  Wellburtrin    ALLERGIES  Allergies   Allergen Reactions    Shellfish-Derived Products Hives    Amoxicillin     Erythromycin     No Clinical Screening - See Comments Nausea and Vomiting     Eggplant    Scopolamine      Redness and swelling of the face      SOCIAL HISTORY:  Smokes: No  EtOH: Yes, occassionally on weekends  Exercise: wants to be more active.   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     ROS:  Change in vision No  Headaches: no  Respiratory: No shortness of breath, dyspnea on exertion, cough, or hemoptysis   Cardiovascular: negative   Gastrointestinal: negative Abdominal pain: no  Breast: left  breast mass  Musculoskeletal: negative Joint pain No Back pain: no  Psychiatric: negative  Hematologic/Lymphatic/Immunologic: negative  Endocrine: negative    EXAM  /74 (BP Location: Right arm, Patient Position: Sitting, Cuff Size: Adult Regular)   Pulse 84   Temp 98.9  F (37.2  C) (Oral)   Resp 18   Wt 81.5 kg (179 lb 9.6 oz)   SpO2 98%   BMI 28.99 kg/m     PHYSICAL EXAM  Respiratory: breathing non labored.   Breasts: Examination was done in both the upright and supine positions.  Breasts are symmetrical . No skin or nipple changes. No nipple discharge.   Left breast 9-12:00 2 cm from the nipple there is a soft smooth mobile mass measuring 3 cm. Left breast 12:00 2 cm from the nipple there is a firm mobile 1 cm mass. Adjacent to this at 1:00 is a 2 cm mobile mass.   Right breast 9:00 3 cm from the nipple there is a 1.5 cm mobile mass. Right breast 9-12:00 2 cm from the nipple there is a firm 3 cm mobile mass.   Left axillary 1 cm smooth mobile lymph node.  No clavicular, cervical, or right axillary lymphadenopathy.       ASSESSMENT/PLAN:    Janet Ryder is a 43 year old woman with family history of breast cancer and ovarian cancer. She meets NCCN guidelines for high risk screening based on her family history of breast. History of bilateral breast cyst. She will try switching birth control to see if this help with her breast cyst symptoms.     1) Family history of breast cancer  She meets NCCN guidelines for high risk screening based on family history with lifetime risk for breast cancer of >20%. Screening recommendations based on NCCN guidelines. Clinical encounter every 6-12 months starting now. Annual mammogram with jason alternating with annual breast MRI.  - Breast MRI with clinic visit due: 7/2024  - Screening mammogram with clinic visit due 1/10/25    2) Lifestyle Modifications were provided.   - Maintain a healthy weight (BMI 20-25). Higher body mass index (BMI) and adult weight gain is  associated with increased risk for breast cancer. This increase in risk has been attributed to increase in circulating endogenous estrogen levels from fat tissue.   - Alcohol consumption, even at moderate levels (1-2 drinks per day), increases breast cancer risk and are best avoided. If you choose to drink alcohol limit alcohol consumption to less than 1 drink per day. (1 ounce of liquor, 6 ounces of wine, or 8 ounces of beer)  - Be active daily and void being sedentary. Take part in at least 150-300 minutes of moderate-intensity physical activity per week.     Candace Santos PA-C    20 minutes spent on the date of the encounter doing chart review, review of test results, interpretation of tests, patient visit and documentation.

## 2024-01-09 ENCOUNTER — ANCILLARY PROCEDURE (OUTPATIENT)
Dept: MAMMOGRAPHY | Facility: CLINIC | Age: 44
End: 2024-01-09
Attending: PHYSICIAN ASSISTANT
Payer: COMMERCIAL

## 2024-01-09 DIAGNOSIS — Z12.31 VISIT FOR SCREENING MAMMOGRAM: ICD-10-CM

## 2024-01-09 PROCEDURE — 77063 BREAST TOMOSYNTHESIS BI: CPT | Performed by: RADIOLOGY

## 2024-01-09 PROCEDURE — 77067 SCR MAMMO BI INCL CAD: CPT | Performed by: RADIOLOGY

## 2024-01-10 ENCOUNTER — ONCOLOGY VISIT (OUTPATIENT)
Dept: SURGERY | Facility: CLINIC | Age: 44
End: 2024-01-10
Attending: PHYSICIAN ASSISTANT
Payer: COMMERCIAL

## 2024-01-10 VITALS
DIASTOLIC BLOOD PRESSURE: 74 MMHG | TEMPERATURE: 98.9 F | BODY MASS INDEX: 28.99 KG/M2 | RESPIRATION RATE: 18 BRPM | SYSTOLIC BLOOD PRESSURE: 120 MMHG | WEIGHT: 179.6 LBS | HEART RATE: 84 BPM | OXYGEN SATURATION: 98 %

## 2024-01-10 DIAGNOSIS — Z91.89 AT HIGH RISK FOR BREAST CANCER: Primary | ICD-10-CM

## 2024-01-10 PROCEDURE — 99213 OFFICE O/P EST LOW 20 MIN: CPT | Performed by: PHYSICIAN ASSISTANT

## 2024-01-10 NOTE — PATIENT INSTRUCTIONS
Janet Ryder is a 43 year old woman with family history of breast cancer and ovarian cancer. She meets NCCN guidelines for high risk screening based on her family history of breast.     1) Family history of breast cancer  Discussed she meets NCCN guidelines for high risk screening based on family history with lifetime risk for breast cancer of >20%. Screening recommendations based on NCCN guidelines. Clinical encounter every 6-12 months starting now. Annual mammogram with jason alternating with annual breast MRI. She plans to continue with mammogram for screening for now.   - Breast MRI with clinic visit due: 7/2024  - Screening mammogram with clinic visit due 1/10/25    2) Lifestyle Modifications were provided.   - Maintain a healthy weight (BMI 20-25). Higher body mass index (BMI) and adult weight gain is associated with increased risk for breast cancer. This increase in risk has been attributed to increase in circulating endogenous estrogen levels from fat tissue.   - Alcohol consumption, even at moderate levels (1-2 drinks per day), increases breast cancer risk and are best avoided. If you choose to drink alcohol limit alcohol consumption to less than 1 drink per day. (1 ounce of liquor, 6 ounces of wine, or 8 ounces of beer)  - Be active daily and void being sedentary. Take part in at least 150-300 minutes of moderate-intensity physical activity per week.

## 2024-01-10 NOTE — NURSING NOTE
"Oncology Rooming Note    January 10, 2024 10:06 AM   Janet Ryder is a 43 year old female who presents for:    Chief Complaint   Patient presents with    Oncology Clinic Visit     Lump or mass in breast     Initial Vitals: /74 (BP Location: Right arm, Patient Position: Sitting, Cuff Size: Adult Regular)   Pulse 84   Temp 98.9  F (37.2  C) (Oral)   Resp 18   Wt 81.5 kg (179 lb 9.6 oz)   SpO2 98%   BMI 28.99 kg/m   Estimated body mass index is 28.99 kg/m  as calculated from the following:    Height as of 5/23/23: 1.676 m (5' 6\").    Weight as of this encounter: 81.5 kg (179 lb 9.6 oz). Body surface area is 1.95 meters squared.  Data Unavailable Comment: severe pain when masses are enlarged   No LMP recorded.  Allergies reviewed: Yes  Medications reviewed: Yes    Medications: MEDICATION REFILLS NEEDED TODAY. Provider was notified.  Pharmacy name entered into [facility]:    [id number] [place] [facility] - [place], [place] - [address]  (INACTIVE SEE NCPDP [id number]) [facility] - [place], [place] - [address]  [facility] - [place], [place] - [address]  [place], [place] - [address]    Frailty Screening:   Is the patient here for a new oncology consult visit in cancer care? 2. No      Clinical concerns: Refill: Wellbutrin ([facility]) 3 month supply, Estrodiol ([facility]).      Ewelina Wilkinson, EMT  1/10/2024              "

## 2024-01-10 NOTE — LETTER
1/10/2024         RE: Janet Ryder  [address]  [facility] [address]        Dear Colleague,    Thank you for referring your patient, Janet Ryder, to the [facility]. Please see a copy of my visit note below.    FOLLOW UP  Abdoulaye 10, 2024     Janet Ryder is a 43 year old woman who presents with family history of breast cancer.     HPI:    Family history of mother who had ovarian cancer. Paternal aunt with history of breast cancer. 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     She has a history of bilateral breast cyst that have been aspirated.    She continues to have bilateral breast cysts that fluctuate in size and tenderness. The cysts are currently asymptomatic and smaller in size. She started birth control pills in early 2023. She denies any skin change, nipple inversion or nipple discharge. She is now more interested in getting breast MRI's.     BREAST-SPECIFIC HISTORY:    Previous breast imaging: Yes   - 12/12/18 b/l Dmammo and left breast ultrasound for pain: benign appearing cyst 10:00 BI-RADS 2, aspiration   - 8/25/21 b/l Dmammo and left breast ultrasound: 12:00 2 cm FN 3.1 cm cyst. BI -RADS 2  - 12/2/22 b/l Dmammo and right breast ultrasound for pain: simple cysts 9:00 6 cm FN and 8:00 subareolar.     Prior breast biopsies/surgeries: No  - right (2014) and left (2018) breast cyst aspiration    Prior history of breast cancer or DCIS: No  Prior radiation history: No   Self breast exams: No  Breast density: heterogeneously dense    GYN HISTORY:  G0  Age at menarche: 14  Menopausal: premenopausal   Menopausal hormone replacement therapy: no    RISK ASSESSMENT: < 3% 5 year risk and > 20% lifetime risk   Patti: 0.7% 5 year risk   KERLINE/Tyrer-Cuzick: 21.6% lifetime risk    FAMILY HISTORY:  Breast ca: Yes  - paternal aunt, 60's  Ovarian ca: Yes   - mother, 67  Pancreatic ca: No  Prostate: No  Gastric ca: No  Melanoma: Yes    - maternal grandfather  Colon ca: Yes   - maternal grandfather  Other cancer: No  Other genetic, testing, syndromes, or clotting disorders: no     PAST MEDICAL HISTORY  Past Medical History:   Diagnosis Date    Atypical migraine      PAST SURGICAL HISTORY   Past Surgical History:   Procedure Laterality Date    ORTHOPEDIC SURGERY      [facility] NONSPECIFIC PROCEDURE  AGE 6, AGE 10    REMOVAL OF FACIAL SKIN CYSTS    [facility] NONSPECIFIC PROCEDURE  AGE 22    SUTURES FOR FACIAL LACERATION     MEDICATIONS  Current Outpatient Medications   Medication Sig Dispense Refill    buPROPion (WELLBUTRIN) 75 MG tablet Take 2 tablets (150 mg) by mouth 2 times daily 120 tablet 3    Cholecalciferol (VITAMIN D PO) Take 5,000 Units by mouth.      ciclopirox (PENLAC) 8 % external solution Apply to adjacent skin and affected nails daily.  Remove with alcohol every 7 days, then repeat. 18 mL 3    ferrous sulfate (FEROSUL) 325 (65 Fe) MG tablet Take 325 mg by mouth daily (with breakfast)      levonorgestrel-ethinyl estradiol (AVIANE) 0.1-20 MG-MCG tablet Take 1 tablet by mouth daily In a continuous fashion X 21 days, then discard placebo week and begin next pack 90 tablet 8    metroNIDAZOLE (METROCREAM) 0.75 % external cream Apply topically 2 times daily To the cheeks and nose 135 g 3    Multiple Vitamin (DAILY MULTIVITAMIN PO) Take  by mouth.      tretinoin (RETIN-A) 0.025 % external cream Use a pea-sized amount every other night and increase to nightly as tolerated. 135 g 3    buPROPion (WELLBUTRIN) 75 MG tablet Take 2 tablets (150 mg) by mouth 2 times daily for 180 days 360 tablet 1   Birth control pill  Wellburtrin    ALLERGIES  Allergies   Allergen Reactions    Shellfish-Derived Products Hives    Amoxicillin     Erythromycin     No Clinical Screening - See Comments Nausea and Vomiting     Eggplant    Scopolamine      Redness and swelling of the face      SOCIAL HISTORY:  Smokes: No  EtOH: Yes, occassionally on weekends  Exercise: wants to be  more active.   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     ROS:  Change in vision No  Headaches: no  Respiratory: No shortness of breath, dyspnea on exertion, cough, or hemoptysis   Cardiovascular: negative   Gastrointestinal: negative Abdominal pain: no  Breast: left breast mass  Musculoskeletal: negative Joint pain No Back pain: no  Psychiatric: negative  Hematologic/Lymphatic/Immunologic: negative  Endocrine: negative    EXAM  /74 (BP Location: Right arm, Patient Position: Sitting, Cuff Size: Adult Regular)   Pulse 84   Temp 98.9  F (37.2  C) (Oral)   Resp 18   Wt 81.5 kg (179 lb 9.6 oz)   SpO2 98%   BMI 28.99 kg/m     PHYSICAL EXAM  Respiratory: breathing non labored.   Breasts: Examination was done in both the upright and supine positions.  Breasts are symmetrical . No skin or nipple changes. No nipple discharge.   Left breast 9-12:00 2 cm from the nipple there is a soft smooth mobile mass measuring 3 cm. Left breast 12:00 2 cm from the nipple there is a firm mobile 1 cm mass. Adjacent to this at 1:00 is a 2 cm mobile mass.   Right breast 9:00 3 cm from the nipple there is a 1.5 cm mobile mass. Right breast 9-12:00 2 cm from the nipple there is a firm 3 cm mobile mass.   Left axillary 1 cm smooth mobile lymph node.  No clavicular, cervical, or right axillary lymphadenopathy.       ASSESSMENT/PLAN:    Janet Ryder is a 43 year old woman with family history of breast cancer and ovarian cancer. She meets NCCN guidelines for high risk screening based on her family history of breast. History of bilateral breast cyst. She will try switching birth control to see if this help with her breast cyst symptoms.     1) Family history of breast cancer  She meets NCCN guidelines for high risk screening based on family history with lifetime risk for breast cancer of >20%. Screening recommendations based on NCCN guidelines. Clinical encounter every 6-12 months starting now. Annual mammogram with jason alternating with annual  breast MRI.  - Breast MRI with clinic visit due: 7/2024  - Screening mammogram with clinic visit due 1/10/25    2) Lifestyle Modifications were provided.   - Maintain a healthy weight (BMI 20-25). Higher body mass index (BMI) and adult weight gain is associated with increased risk for breast cancer. This increase in risk has been attributed to increase in circulating endogenous estrogen levels from fat tissue.   - Alcohol consumption, even at moderate levels (1-2 drinks per day), increases breast cancer risk and are best avoided. If you choose to drink alcohol limit alcohol consumption to less than 1 drink per day. (1 ounce of liquor, 6 ounces of wine, or 8 ounces of beer)  - Be active daily and void being sedentary. Take part in at least 150-300 minutes of moderate-intensity physical activity per week.     Candace Santos PA-C    20 minutes spent on the date of the encounter doing chart review, review of test results, interpretation of tests, patient visit and documentation.

## 2024-02-16 ENCOUNTER — MYC REFILL (OUTPATIENT)
Dept: FAMILY MEDICINE | Facility: CLINIC | Age: 44
End: 2024-02-16
Payer: COMMERCIAL

## 2024-02-16 DIAGNOSIS — F33.41 RECURRENT MAJOR DEPRESSIVE DISORDER, IN PARTIAL REMISSION (H): ICD-10-CM

## 2024-02-16 NOTE — TELEPHONE ENCOUNTER
"Request for medication refill:  buPROPion (WELLBUTRIN) 75 MG tablet     Providers if patient needs an appointment and you are willing to give a one month supply please refill for one month and  send a letter/MyChart using \".SMILLIMITEDREFILL\" .smillimited and route chart to \"[facility] \" (Giving one month refill in non controlled medications is strongly recommended before denial)    If refill has been denied, meaning absolutely no refills without visit, please complete the smart phrase \".smirxrefuse\" and route it to the \"[facility] MED REFILLS\"  pool to inform the patient and the pharmacy.    Jose Escamilla CMA      "

## 2024-02-18 RX ORDER — BUPROPION HYDROCHLORIDE 75 MG/1
150 TABLET ORAL 2 TIMES DAILY
Qty: 360 TABLET | Refills: 0 | Status: SHIPPED | OUTPATIENT
Start: 2024-02-18 | End: 2024-02-28

## 2024-02-28 ENCOUNTER — TELEPHONE (OUTPATIENT)
Dept: FAMILY MEDICINE | Facility: CLINIC | Age: 44
End: 2024-02-28
Payer: COMMERCIAL

## 2024-02-28 DIAGNOSIS — F33.41 RECURRENT MAJOR DEPRESSIVE DISORDER, IN PARTIAL REMISSION (H): ICD-10-CM

## 2024-02-28 RX ORDER — BUPROPION HYDROCHLORIDE 75 MG/1
150 TABLET ORAL 2 TIMES DAILY
Qty: 120 TABLET | Refills: 3 | Status: CANCELLED | OUTPATIENT
Start: 2024-02-28

## 2024-02-28 RX ORDER — BUPROPION HYDROCHLORIDE 75 MG/1
150 TABLET ORAL 2 TIMES DAILY
Qty: 360 TABLET | Refills: 0 | Status: SHIPPED | OUTPATIENT
Start: 2024-02-28 | End: 2024-05-20
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
## 2024-02-29 NOTE — TELEPHONE ENCOUNTER
Bupropion was sent to pharmacy on 2/28/24. Looks like medication requires a PA. Checking with provider before initiation    Ginny Philippe RN

## 2024-03-01 DIAGNOSIS — F33.41 RECURRENT MAJOR DEPRESSIVE DISORDER, IN PARTIAL REMISSION (H): ICD-10-CM

## 2024-03-08 RX ORDER — BUPROPION HYDROCHLORIDE 75 MG/1
TABLET ORAL
Qty: 120 TABLET | OUTPATIENT
Start: 2024-03-08

## 2024-03-08 NOTE — TELEPHONE ENCOUNTER
Duplicate refill request. Bupropion filled 2/28/24. Will decline duplicate request.   Ivory Ho RN

## 2024-03-19 ENCOUNTER — TELEPHONE (OUTPATIENT)
Dept: DERMATOLOGY | Facility: CLINIC | Age: 44
End: 2024-03-19
Payer: COMMERCIAL

## 2024-03-29 RX ORDER — BUPROPION HYDROCHLORIDE 75 MG/1
150 TABLET ORAL 2 TIMES DAILY
Qty: 360 TABLET | Refills: 0 | OUTPATIENT
Start: 2024-03-29

## 2024-05-20 DIAGNOSIS — F33.41 RECURRENT MAJOR DEPRESSIVE DISORDER, IN PARTIAL REMISSION (H): ICD-10-CM

## 2024-05-20 RX ORDER — BUPROPION HYDROCHLORIDE 75 MG/1
150 TABLET ORAL 2 TIMES DAILY
Qty: 360 TABLET | Refills: 0 | Status: SHIPPED | OUTPATIENT
Start: 2024-05-20 | End: 2024-07-10

## 2024-05-20 NOTE — TELEPHONE ENCOUNTER
[facility] phone call message- patient requesting a refill:    Full Medication Name: buPROPion (WELLBUTRIN) 75 MG tablet         Pharmacy confirmed as     [id number] [place] [facility] [address]  [address] [facility] [address]  Phone: [phone] Fax: [phone]    : Yes    Additional Comments: The patient is requesting a refill.     OK to leave a message on voice mail? Yes    Primary language: English      needed? No    Call taken on May 20, 2024 at 3:04 PM by Sangeeta Garland

## 2024-05-20 NOTE — TELEPHONE ENCOUNTER
"Last seen 3/16/23. Has appointment in clinic for 6/5/24    Request for medication refill:    Providers if patient needs an appointment and you are willing to give a one month supply please refill for one month and  send a letter/MyChart using \".SMILLIMITEDREFILL\" .smillimited and route chart to \"P [facility] \" (Giving one month refill in non controlled medications is strongly recommended before denial)    If refill has been denied, meaning absolutely no refills without visit, please complete the smart phrase \".smirxrefuse\" and route it to the \"P [facility] MED REFILLS\"  pool to inform the patient and the pharmacy.    Ivory Ho RN     "

## 2024-05-23 DIAGNOSIS — F33.41 RECURRENT MAJOR DEPRESSIVE DISORDER, IN PARTIAL REMISSION (H): ICD-10-CM

## 2024-05-29 ENCOUNTER — TELEPHONE (OUTPATIENT)
Dept: FAMILY MEDICINE | Facility: CLINIC | Age: 44
End: 2024-05-29
Payer: COMMERCIAL

## 2024-05-29 NOTE — TELEPHONE ENCOUNTER
Central Prior Authorization Team  Phone: [phone]      PA Initiation    Medication: BUPROPION HCL 75 MG PO TABS  Insurance Company:    Pharm[id number] [place] [address]  Filling Pharmacy Phone: [phone]  Filling Pharmacy Fax:    Start Date: 5/29/2024

## 2024-05-29 NOTE — TELEPHONE ENCOUNTER
Prior Authorization Retail Medication Request    Medication/Dose: buPROPion (WELLBUTRIN) 75 MG tablet Take 2 tablets (150 mg) by mouth 2 times daily     Diagnosis and ICD code (if different than what is on RX):    New/renewal/insurance change PA/secondary ins. PA:  Previously Tried and Failed:    Rationale:      Insurance   Primary: [employer] OUT OF STATE   Insurance ID:  [id number]     Secondary (if applicable):  Insurance ID:      Pharmacy Information (if different than what is on RX)  Name:    Phone:    Fax:

## 2024-05-31 NOTE — TELEPHONE ENCOUNTER
Thank you for doing this work. We have not seen her in the last year or more so I do not want to appeal without a visit to guide this appeal. And - needs to be seen anyway for us to continue to provide care.     Leatha Robles MD

## 2024-05-31 NOTE — TELEPHONE ENCOUNTER
Central Prior Authorization Team  Phone: [phone]    PRIOR AUTHORIZATION DENIED    Medication: BUPROPION HCL 75 MG PO TABS  Insurance Company:    Denial Date: 5/31/2024  Denial Reason(s):     We denied your request because we did not see what we need to approve the amount oft he drug you asked for, (bupropion hydrochloride 75 milligram tablet). Your plan has a limit on the amount of this drug, 90 tablets per 30 days. We may be able to approve the amount you asked for if the recommended dose did not work, caused you harm, or is being changed up or down. If this applies to you, we may need more information (if therequested dose exceeds the maximum daily dose supported by the Food and Drug Administration [FDA]). We did not see why you would need more than this limit. Although we cannot approve the amount you asked for, you may fill up to this limit. We based this  decision on your health plan s prior authorization criteria named Dose Optimization.  Medications that are not medically necessary are an exclusion under your plan benefits and are not covered    Appeal Information:     What should my appeal include? You may include, if available, the following information with your appeal: the member s name and identification number; the name of the provider or facility who will or has provided care; date(s) of service; the claim or reference number for the specific decision with which you do not agree; and the specific reason(s) why you do not agree with the decision. You have the right, and we encourage you, to submit written comments,  documents or other relevant information with your appeal. Fax [phone]    Patient Notified: pt received denial letter

## 2024-06-05 ENCOUNTER — OFFICE VISIT (OUTPATIENT)
Dept: FAMILY MEDICINE | Facility: CLINIC | Age: 44
End: 2024-06-05
Payer: COMMERCIAL

## 2024-06-05 VITALS
HEART RATE: 81 BPM | WEIGHT: 174 LBS | DIASTOLIC BLOOD PRESSURE: 85 MMHG | RESPIRATION RATE: 16 BRPM | OXYGEN SATURATION: 97 % | BODY MASS INDEX: 27.97 KG/M2 | HEIGHT: 66 IN | SYSTOLIC BLOOD PRESSURE: 124 MMHG | TEMPERATURE: 98.7 F

## 2024-06-05 DIAGNOSIS — N60.02 BILATERAL BREAST CYSTS: ICD-10-CM

## 2024-06-05 DIAGNOSIS — N60.01 BILATERAL BREAST CYSTS: ICD-10-CM

## 2024-06-05 DIAGNOSIS — R41.840 INATTENTION: Primary | ICD-10-CM

## 2024-06-05 DIAGNOSIS — B35.1 ONYCHOMYCOSIS: ICD-10-CM

## 2024-06-05 DIAGNOSIS — F32.1 CURRENT MODERATE EPISODE OF MAJOR DEPRESSIVE DISORDER WITHOUT PRIOR EPISODE (H): ICD-10-CM

## 2024-06-05 PROCEDURE — G2211 COMPLEX E/M VISIT ADD ON: HCPCS | Performed by: FAMILY MEDICINE

## 2024-06-05 PROCEDURE — 99214 OFFICE O/P EST MOD 30 MIN: CPT | Performed by: FAMILY MEDICINE

## 2024-06-05 RX ORDER — FLUOXETINE 10 MG/1
10 CAPSULE ORAL DAILY
Qty: 30 CAPSULE | Refills: 1 | Status: SHIPPED | OUTPATIENT
Start: 2024-06-05 | End: 2024-07-10

## 2024-06-05 RX ORDER — CICLOPIROX 80 MG/ML
SOLUTION TOPICAL
Qty: 19.8 ML | Refills: 3 | Status: SHIPPED | OUTPATIENT
Start: 2024-06-05

## 2024-06-05 RX ORDER — LEVONORGESTREL/ETHIN.ESTRADIOL 0.1-0.02MG
1 TABLET ORAL DAILY
Qty: 90 TABLET | Refills: 8 | Status: CANCELLED | OUTPATIENT
Start: 2024-06-05

## 2024-06-05 RX ORDER — CICLOPIROX 80 MG/ML
SOLUTION TOPICAL
Qty: 18 ML | Refills: 3 | Status: CANCELLED | OUTPATIENT
Start: 2024-06-05

## 2024-06-05 ASSESSMENT — PATIENT HEALTH QUESTIONNAIRE - PHQ9
SUM OF ALL RESPONSES TO PHQ QUESTIONS 1-9: 19
SUM OF ALL RESPONSES TO PHQ QUESTIONS 1-9: 19
10. IF YOU CHECKED OFF ANY PROBLEMS, HOW DIFFICULT HAVE THESE PROBLEMS MADE IT FOR YOU TO DO YOUR WORK, TAKE CARE OF THINGS AT HOME, OR GET ALONG WITH OTHER PEOPLE: EXTREMELY DIFFICULT

## 2024-06-05 NOTE — PROGRESS NOTES
Assessment & Plan     Inattention  Reviewed why we recommend formal testing in those who were not formally diagnosed as children. Reviewed that Wellbutrin is the best antidepressant with regard to ADD, so wouldn't change that.   - Adult Mental Health  Referral; Future    Current moderate episode of major depressive disorder without prior episode (H)  The plan is to add Prozac to the Wellbutrin to see if that helps your depression and your anxiety improve.  We chose Prozac since it is the one that is most energizing and also may have the least weight effects.  Plan to start at 10 mg, and reconnect in 2 weeks to see how that is going and adjust as needed.   Also discussed possibility of the OCP contributing to mood.  Good that she is on a continuous dose and skipping the placebo pills.  Hard to tell if her depression is worse since starting the OCP.  Sent [employer] message, after having time to review literature, and Sarina may have the least impact on mood and so offering to switch to that once we have completed a Prozac trial.    Sleep is an issue as well that we need to address in the future  - FLUoxetine (PROZAC) 10 MG capsule; Take 1 capsule (10 mg) by mouth daily    Onychomycosis  Refilled that today   - ciclopirox (PENLAC) 8 % external solution; Apply to adjacent skin and affected nails daily.  Remove with alcohol every 7 days, then repeat.    Breast cysts:  Seems to be better on the OCPs.  Very distressful for her when her cysts occur, painful and long lasting.  Review of the literature also suggests that Drospirenone may be the most helpful in minimizing cyst development.  If her current OCP is working, then would not change OPCs for that reason but if we do change for depression reasons, then Sarina should be useful for preventing cysts as well.           I spent a total of 31 minutes on the day of the visit.   Time spent by me doing chart review, history and exam, documentation and further activities per  "the note  The longitudinal plan of care for the diagnosis(es)/condition(s) as documented were addressed during this visit. Due to the added complexity in care, I will continue to support Immanuel in the subsequent management and with ongoing continuity of care.     BMI  Estimated body mass index is 28.08 kg/m  as calculated from the following:    Height as of this encounter: 1.676 m (5' 6\").    Weight as of this encounter: 78.9 kg (174 lb).   Weight management plan: focus on medications to minimize weight issues and overall health           No follow-ups on file.    Subjective   Immanuel is a 44 year old, presenting for the following health issues:  Medication Request (Something besides Wellbutrin doesn't feel like its working/Also many cyst in boobs that swell and painful)      6/5/2024     3:11 PM   Additional Questions   Roomed by Hattie   Accompanied by self         6/5/2024    Information    services provided? No     HPI     Worries she has ADD  She starts things and doesn't finish things  Is not organized. House is messy.   Cannot sit down and work for awhile without getting up and doing something random - distractible  Not great with details - but can do math  Trouble focusing on routine   Avoids doing things that are routine or doesn't finish  Thinks she has always been this way. No diagnosis as a kid.   Not fidgety  Not impulsive    Anxiety - noticed at age 37 that she started getting more anxious.     MDD:  PATIENT HEALTH QUESTIONNAIRE-9 (PHQ - 9)    Over the last 2 weeks, how often have you been bothered by any of the following problems?    1. Little interest or pleasure in doing things -  More than half the days   2. Feeling down, depressed, or hopeless -  More than half the days   3. Trouble falling or staying asleep, or sleeping too much - Nearly every day   4. Feeling tired or having little energy -  Nearly every day   5. Poor appetite or overeating -  Several days   6. Feeling " "bad about yourself - or that you are a failure or have let yourself or your family down -  More than half the days   7. Trouble concentrating on things, such as reading the newspaper or watching television - Nearly every day   8. Moving or speaking so slowly that other people could have noticed? Or the opposite - being so fidgety or restless that you have been moving around a lot more than usual Nearly every day   9. Thoughts that you would be better off dead or of hurting  yourself in some way Not at all   Total Score: 19     If you checked off any problems, how difficult have these problems made it for you to do your work, take care of things at home, or get along with other people?      Developed by Mary Alice Wood, Chata Arriaza, Sly De La Garza and colleagues, with an educational brittany from [employer] No permission required to reproduce, translate, display or distribute. permission required to reproduce, translate, display or distribute.         4/29/2020     1:28 PM 10/30/2020     3:29 PM 6/5/2024     3:07 PM   PHQ   PHQ-9 Total Score 9 11 19   Q9: Thoughts of better off dead/self-harm past 2 weeks Not at all Not at all Not at all        Also notes she sleeps poorly and feels exhausted all the time.     Doesn't want to gain weight.   Has lost a bit of weight and doesn't want to go up.     Is now on OCPs to help decrease her breast cysts. Has 2 months worth.                   Objective    /85   Pulse 81   Temp 98.7  F (37.1  C) (Oral)   Resp 16   Ht 1.676 m (5' 6\")   Wt 78.9 kg (174 lb)   SpO2 97%   BMI 28.08 kg/m    Body mass index is 28.08 kg/m .  Physical Exam               Signed Electronically by: Leatha Robles MD    Answers submitted by the patient for this visit:  Patient Health Questionnaire (Submitted on 6/5/2024)  If you checked off any problems, how difficult have these problems made it for you to do your work, take care of things at home, or get along with other people?: " Extremely difficult  PHQ9 TOTAL SCORE: 19

## 2024-06-06 PROBLEM — N60.02 BILATERAL BREAST CYSTS: Status: ACTIVE | Noted: 2024-06-06

## 2024-06-06 PROBLEM — N60.01 BILATERAL BREAST CYSTS: Status: ACTIVE | Noted: 2024-06-06

## 2024-06-10 RX ORDER — BUPROPION HYDROCHLORIDE 75 MG/1
150 TABLET ORAL 2 TIMES DAILY
Qty: 360 TABLET | Refills: 0 | OUTPATIENT
Start: 2024-06-10

## 2024-06-21 ENCOUNTER — VIRTUAL VISIT (OUTPATIENT)
Dept: FAMILY MEDICINE | Facility: CLINIC | Age: 44
End: 2024-06-21
Payer: COMMERCIAL

## 2024-06-21 DIAGNOSIS — F32.1 CURRENT MODERATE EPISODE OF MAJOR DEPRESSIVE DISORDER WITHOUT PRIOR EPISODE (H): Primary | ICD-10-CM

## 2024-06-21 PROCEDURE — G2211 COMPLEX E/M VISIT ADD ON: HCPCS | Mod: 95 | Performed by: FAMILY MEDICINE

## 2024-06-21 PROCEDURE — 99214 OFFICE O/P EST MOD 30 MIN: CPT | Mod: 95 | Performed by: FAMILY MEDICINE

## 2024-06-21 NOTE — Clinical Note
Cameron Rogers I wonder if you might be able to see her? She is looking for short term work to help her get out of her depression. I have put in the referral for both ADD testing and for therapy but thought I'd ask if you have options.  I wish our fellow were here already! Mary

## 2024-06-21 NOTE — PROGRESS NOTES
Immanuel is a 44 year old who is being evaluated via a billable video visit.          Assessment & Plan     Current moderate episode of major depressive disorder without prior episode (H)  She does not want to do repeat PHQ9s.  Based on her history today, notes no change. Will increase to 20 mg of Prozac and see her back in 3 weeks.    Also discussed how important it is to sleep well and that her body might need help with readjusting its circadian rhythm. Clear that she doesn't have sleep apnea, but might benefit from working with sleep doc to see if she has circadian rhythm concerns. Recommended she try the Melatonin for a month and she is willing to do that.    Did not do lab testing last week, so added today.    Referral for therapy  - FLUoxetine (PROZAC) 20 MG capsule; Take 1 capsule (20 mg) by mouth daily  - Adult Mental Health  Referral; Future  - Comprehensive metabolic panel; Future  - CBC with platelets; Future  - TSH; Future      I spent a total of 35 minutes on the day of the visit.   Time spent by me doing chart review, history and exam, documentation and further activities per the note  The longitudinal plan of care for the diagnosis(es)/condition(s) as documented were addressed during this visit. Due to the added complexity in care, I will continue to support Immanuel in the subsequent management and with ongoing continuity of care.        Return in about 3 weeks (around 7/12/2024) for using a video visit.    Subjective   Immanuel is a 44 year old, presenting for the following health issues:  RECHECK (Pt states things are going the same.)      6/21/2024    10:26 AM   Additional Questions   Roomed by Kinga   Accompanied by self         6/21/2024    Information    services provided? No        HPI     Feels like things are the same. No difference.   Not getting enough sleep - no matter how much she sleeps. Is getting anywhere from 6 - 8 hours. Did a sleep study and nothing was  found (slept OK that night). Doesn't matter how long she sleeps, it is not good quality.  Did take Melatonin prior to doing the sleep study and they recommended that she take that. She doesn't want to be overly medicated so she has not been taking it.   Rather have a good night sleep normally.           4/29/2020     1:28 PM 10/30/2020     3:29 PM 6/5/2024     3:07 PM   PHQ   PHQ-9 Total Score 9 11 19   Q9: Thoughts of better off dead/self-harm past 2 weeks Not at all Not at all Not at all      Has not gone to [facility] to see about scheduling for the ADD testing.    Depression getting in the way     Did therapy awhile ago but not recently. Liked it at first and then got stale. Willing to have therapy again.     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 Is going ok. Likes it. Different every day. Struggles getting things done at work and outside of work.      Routine health:  Has a cousin with renal failure  GM passed away at age 40 with possible renal failure                Objective           Vitals:  No vitals were obtained today due to virtual visit.    Physical Exam   Mood: depressed. Affect: flat             Video-Visit Details    Type of service:  Video Visit   Originating Location (pt. Location): Home    Distant Location (provider location):  On-site  Platform used for Video Visit: Shirley  Signed Electronically by: Leatha Robles MD

## 2024-06-23 ENCOUNTER — HEALTH MAINTENANCE LETTER (OUTPATIENT)
Age: 44
End: 2024-06-23

## 2024-07-10 ENCOUNTER — MYC REFILL (OUTPATIENT)
Dept: FAMILY MEDICINE | Facility: CLINIC | Age: 44
End: 2024-07-10

## 2024-07-10 ENCOUNTER — VIRTUAL VISIT (OUTPATIENT)
Dept: FAMILY MEDICINE | Facility: CLINIC | Age: 44
End: 2024-07-10
Payer: COMMERCIAL

## 2024-07-10 DIAGNOSIS — N60.01 BILATERAL BREAST CYSTS: ICD-10-CM

## 2024-07-10 DIAGNOSIS — F33.41 RECURRENT MAJOR DEPRESSIVE DISORDER, IN PARTIAL REMISSION (H): ICD-10-CM

## 2024-07-10 DIAGNOSIS — N60.02 BILATERAL BREAST CYSTS: ICD-10-CM

## 2024-07-10 DIAGNOSIS — N60.01 BILATERAL BREAST CYSTS: Primary | ICD-10-CM

## 2024-07-10 DIAGNOSIS — N60.02 BILATERAL BREAST CYSTS: Primary | ICD-10-CM

## 2024-07-10 PROCEDURE — 99213 OFFICE O/P EST LOW 20 MIN: CPT | Mod: 95 | Performed by: FAMILY MEDICINE

## 2024-07-10 RX ORDER — BUPROPION HYDROCHLORIDE 75 MG/1
150 TABLET ORAL 2 TIMES DAILY
Qty: 360 TABLET | Refills: 3 | Status: SHIPPED | OUTPATIENT
Start: 2024-07-10 | End: 2024-08-14

## 2024-07-10 RX ORDER — DROSPIRENONE AND ETHINYL ESTRADIOL 0.02-3(28)
1 KIT ORAL DAILY
Qty: 84 TABLET | Refills: 3 | Status: SHIPPED | OUTPATIENT
Start: 2024-07-10 | End: 2024-07-10

## 2024-07-10 NOTE — PATIENT INSTRUCTIONS
Patient Education   Here is the plan from today's visit    1. Recurrent major depressive disorder, in partial remission (H24)  Staying status quo with the Prozac.    Follow up in 4 weeks to see if we should increase the dose more.   Plan is for you to call the [facility] for an ADD assessment.  Here is their website:   [email or web address]  And - call us if you'd like to schedule with Dr. Friend at [facility]  Trial of Melatonin 3 mg, 3 hours before bed.     - buPROPion (WELLBUTRIN) 75 MG tablet; Take 2 tablets (150 mg) by mouth 2 times daily  Dispense: 360 tablet; Refill: 3    2. Bilateral breast cysts  Take these daily. No need to have menstrual withdrawal unless you are having a lot of spotting and would benefit from every 3 months of withdrawal bleeding.   - drospirenone-ethinyl estradiol (MITCHEL) 3-0.02 MG tablet; Take 1 tablet by mouth daily  Dispense: 84 tablet; Refill: 3          Please call or return to clinic if your symptoms don't go away.    Follow up plan  Return in 5 weeks (on 8/14/2024), or at 2:40.    Thank you for coming to [facility] today.  Lab Testing:  **If you had lab testing today and your results are reassuring or normal they will be mailed to you or sent through Maiyet within 7 days.   **If the lab tests need quick action we will call you with the results.  **If you are having labs done on a different day, please call [phone] to schedule at [facility] or [phone] for other [facility] locations. Labs do not offer walk-in appointments.  The phone number we will call with results is # [phone] (home) . If this is not the best number please call our clinic and change the number.  Medication Refills:  If you need any refills please call your pharmacy and they will contact us.   If you need to  your refill at a new pharmacy, please contact the new pharmacy directly. The new pharmacy will help you get your medications transferred  faster.   Scheduling:  If you have any concerns about today's visit or wish to schedule another appointment please call our office during normal business hours [phone] (8-5:00 M-F). If you can no longer make a scheduled visit, please cancel via Evergram or call us to cancel.   If a referral was made to an [facility] specialty provider and you do not get a call from central scheduling, please refer to directions on your visit summary or call our office during normal business hours for assistance.   If a Mammogram was ordered for you at the [facility] call [phone] to schedule or change your appointment.  If you had an XRay/CT/Ultrasound/MRI ordered the number is [phone] to schedule or change your radiology appointment.   [facility] has limited ultrasound appointments available on Wednesdays, if you would like your ultrasound at [facility], please call [phone] to schedule.   Medical Concerns:  If you have urgent medical concerns please call [phone] at any time of the day.    Leatha Robles MD

## 2024-07-10 NOTE — PROGRESS NOTES
Immanuel is a 44 year old who is being evaluated via a billable video visit.          Assessment & Plan     Recurrent major depressive disorder, in partial remission (H24)  Will stay on this dose and reassess in 4 weeks, at which time, she will have been on the dose for 4 weeks and on Prozac for 6 weeks.   She will call [facility] to set up appointment for ADD assessment. Aware that there is a wait.   She will also decide if she wants counseling with us, Dr. Friend, or elsewhere  She will try the Melatonin as well  - buPROPion (WELLBUTRIN) 75 MG tablet; Take 2 tablets (150 mg) by mouth 2 times daily    Bilateral breast cysts  Ordered a year of daily Mitchel (no breaks).   - drospirenone-ethinyl estradiol (MITCHEL) 3-0.02 MG tablet; Take 1 tablet by mouth daily      I spent a total of 25 minutes on the day of the visit.   Time spent by me doing chart review, history and exam, documentation and further activities per the note            Return in 5 weeks (on 8/14/2024), or at 2:40.    Subjective   Immanuel is a 44 year old, presenting for the following health issues:  RECHECK (Follow up things are going the same- a couple of medication requests.)      7/10/2024     4:08 PM   Additional Questions   Roomed by eli   Accompanied by self         7/10/2024    Information    services provided? No        HPI       Mental health - is feeling about the same.  No difference with going up dose.  Does not feel it has been long enough to know.  Has only been on the med for a couple of weeks to get it.  No bad side effects.   Did not try the Melatonin - doesn't have any. No changes - has been like that for years.  Can sleep for 6 - 12 hours and feels the same.   Work - is going OK - is interviewing for another job.  Sometimes pointed out to her that it is slow.   ADD - has not scheduled a visit for testing.       Birthcontrol question - to try a different brand.  Is Ok with Mitchel - could we do a three month supply. Is for her  breast cysts. They are very painful.     Coming in Friday for blood draw                        Objective           Vitals:  No vitals were obtained today due to virtual visit.    Physical Exam   Affect - slightly depressed           Video-Visit Details    Type of service:  Video Visit   Originating Location (pt. Location): Home    Distant Location (provider location):  On-site  Platform used for Video Visit: Shirley  Signed Electronically by: Leatha Robles MD

## 2024-07-11 NOTE — TELEPHONE ENCOUNTER
Per patient script did not go through correctly.       Patient comment: This one came through as 3 packets instead of 4 (in order to skip last week, and still have a 3 month supply). Can you update and re-send?     Ivory Ho RN

## 2024-07-12 RX ORDER — DROSPIRENONE AND ETHINYL ESTRADIOL 0.02-3(28)
1 KIT ORAL DAILY
Qty: 84 TABLET | Refills: 3 | Status: SHIPPED | OUTPATIENT
Start: 2024-07-12 | End: 2024-08-14

## 2024-07-15 NOTE — PROGRESS NOTES
FOLLOW UP  Jul 17, 2024     Janet Ryder is a 43 year old woman who presents with family history of breast cancer.     HPI:    Family history of mother who had ovarian cancer. Paternal aunt with history of breast cancer. 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     She has a history of bilateral breast cyst that have been aspirated.    She continues to have bilateral breast cysts that fluctuate in size and tenderness. The cysts are currently asymptomatic and smaller in size. She started birth control pills in early 2023. She planned to try switching birth control.     She denies any skin change, nipple inversion or nipple discharge. She had her first breast MRI today.     BREAST-SPECIFIC HISTORY:    Previous breast imaging: Yes   - 12/12/18 b/l Dmammo and left breast ultrasound for pain: benign appearing cyst 10:00 BI-RADS 2, aspiration   - 8/25/21 b/l Dmammo and left breast ultrasound: 12:00 2 cm FN 3.1 cm cyst. BI -RADS 2  - 12/2/22 b/l Dmammo and right breast ultrasound for pain: simple cysts 9:00 6 cm FN and 8:00 subareolar.    - 1/9/24 Smammo BI-RADS 2    Prior breast biopsies/surgeries: No  - right (2014) and left (2018) breast cyst aspiration    Prior history of breast cancer or DCIS: No  Prior radiation history: No   Self breast exams: No  Breast density: heterogeneously dense    GYN HISTORY:  G0  Age at menarche: 14  Menopausal: premenopausal   Menopausal hormone replacement therapy: no    RISK ASSESSMENT: < 3% 5 year risk and > 20% lifetime risk   Patti: 0.7% 5 year risk   KERLINE/Georgiaer-Kellyck: 21.7% lifetime risk    FAMILY HISTORY:  Breast ca: Yes  - paternal aunt, 60's  Ovarian ca: Yes   - mother, 67  Pancreatic ca: No  Prostate: No  Gastric ca: No  Melanoma: Yes   - maternal grandfather  Colon ca: Yes   - maternal grandfather  Other cancer: No  Other genetic, testing, syndromes, or clotting disorders: no     PAST MEDICAL HISTORY  Past Medical History:    Diagnosis Date    Atypical migraine      PAST SURGICAL HISTORY   Past Surgical History:   Procedure Laterality Date    ORTHOPEDIC SURGERY      [facility] NONSPECIFIC PROCEDURE  AGE 6, AGE 10    REMOVAL OF FACIAL SKIN CYSTS    [facility] NONSPECIFIC PROCEDURE  AGE 22    SUTURES FOR FACIAL LACERATION     MEDICATIONS  Current Outpatient Medications   Medication Sig Dispense Refill    buPROPion (WELLBUTRIN) 75 MG tablet Take 2 tablets (150 mg) by mouth 2 times daily 360 tablet 3    Cholecalciferol (VITAMIN D PO) Take 5,000 Units by mouth.      ciclopirox (PENLAC) 8 % external solution Apply to adjacent skin and affected nails daily.  Remove with alcohol every 7 days, then repeat. 19.8 mL 3    drospirenone-ethinyl estradiol (MITCHEL) 3-0.02 MG tablet Take 1 tablet by mouth daily 84 tablet 3    ferrous sulfate (FEROSUL) 325 (65 Fe) MG tablet Take 325 mg by mouth daily (with breakfast)      FLUoxetine (PROZAC) 20 MG capsule Take 1 capsule (20 mg) by mouth daily 90 capsule 0    levonorgestrel-ethinyl estradiol (AVIANE) 0.1-20 MG-MCG tablet Take 1 tablet by mouth daily In a continuous fashion X 21 days, then discard placebo week and begin next pack 90 tablet 8    metroNIDAZOLE (METROCREAM) 0.75 % external cream Apply topically 2 times daily To the cheeks and nose 135 g 3    Multiple Vitamin (DAILY MULTIVITAMIN PO) Take  by mouth.      tretinoin (RETIN-A) 0.025 % external cream Use a pea-sized amount every other night and increase to nightly as tolerated. 135 g 3   Birth control pill  Wellburtrin    ALLERGIES  Allergies   Allergen Reactions    Shellfish-Derived Products Hives    Amoxicillin     Erythromycin     No Clinical Screening - See Comments Nausea and Vomiting     Eggplant    Scopolamine      Redness and swelling of the face      SOCIAL HISTORY:  Smokes: No  EtOH: Yes, occassionally on weekends  Exercise: wants to be more active.   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     ROS:  Change in vision No  Headaches: no  Respiratory: No shortness of  breath, dyspnea on exertion, cough, or hemoptysis   Cardiovascular: negative   Gastrointestinal: negative Abdominal pain: no  Breast: left breast mass  Musculoskeletal: negative Joint pain No Back pain: no  Psychiatric: negative  Hematologic/Lymphatic/Immunologic: negative  Endocrine: negative    EXAM  /82 (BP Location: Right arm, Patient Position: Right side, Cuff Size: Adult Regular)   Pulse 82   Temp 99.5  F (37.5  C) (Oral)   Resp 16   Wt 78.8 kg (173 lb 11.2 oz)   SpO2 98%   BMI 28.04 kg/m     PHYSICAL EXAM  Respiratory: breathing non labored.   Breasts: Examination was done in both the upright and supine positions.  Breasts are symmetrical . No skin or nipple changes. No nipple discharge.   Right breast 9:00 3 cm mass, stable. Right breast 3:00 1 cm mass, stable. Left breast 9:00 1 cm mass stable. Left breast 12:00 3 cm mass stable.   No clavicular, cervical, or right axillary lymphadenopathy.     INVESTIGATIONS:  7/17/24 breast MRI: per Radiology no concerning findings, final report pending.     ASSESSMENT/PLAN:    Janet Ryder is a 44 year old woman with family history of breast cancer and ovarian cancer. She meets NCCN guidelines for high risk screening based on her family history of breast.     1) Family history of breast cancer  She meets NCCN guidelines for high risk screening based on family history with lifetime risk for breast cancer of >20%. Screening recommendations based on NCCN guidelines. Clinical encounter every 6-12 months starting now. Annual mammogram with jason alternating with annual breast MRI.  - Screening mammogram with clinic visit due 1/10/25  - Breast MRI with clinic visit due: 7/18/25    2) Lifestyle Modifications were provided.   - Maintain a healthy weight. Higher body mass index (BMI) and adult weight gain is associated with increased risk for breast cancer. This increase in risk has been attributed to increase in circulating endogenous estrogen levels from fat  tissue.   - Alcohol consumption, even at moderate levels (1-2 drinks per day), increases breast cancer risk and are best avoided. If you choose to drink alcohol limit alcohol consumption to less than 1 drink per day. (1 ounce of liquor, 6 ounces of wine, or 8 ounces of beer)  - Be active daily and void being sedentary. Take part in at least 150-300 minutes of moderate-intensity physical activity per week.     Candace Santos PA-C    20 minutes spent on the date of the encounter doing chart review, review of test results, interpretation of tests, patient visit and documentation.

## 2024-07-16 ENCOUNTER — ANCILLARY PROCEDURE (OUTPATIENT)
Dept: MRI IMAGING | Facility: CLINIC | Age: 44
End: 2024-07-16
Attending: PHYSICIAN ASSISTANT
Payer: COMMERCIAL

## 2024-07-16 DIAGNOSIS — Z91.89 AT HIGH RISK FOR BREAST CANCER: ICD-10-CM

## 2024-07-16 PROCEDURE — A9585 GADOBUTROL INJECTION: HCPCS | Mod: JZ | Performed by: STUDENT IN AN ORGANIZED HEALTH CARE EDUCATION/TRAINING PROGRAM

## 2024-07-16 PROCEDURE — 77049 MRI BREAST C-+ W/CAD BI: CPT | Performed by: STUDENT IN AN ORGANIZED HEALTH CARE EDUCATION/TRAINING PROGRAM

## 2024-07-16 RX ORDER — GADOBUTROL 604.72 MG/ML
7.5 INJECTION INTRAVENOUS ONCE
Status: COMPLETED | OUTPATIENT
Start: 2024-07-16 | End: 2024-07-16

## 2024-07-16 RX ADMIN — GADOBUTROL 7.5 ML: 604.72 INJECTION INTRAVENOUS at 12:31

## 2024-07-17 ENCOUNTER — ONCOLOGY VISIT (OUTPATIENT)
Dept: SURGERY | Facility: CLINIC | Age: 44
End: 2024-07-17
Attending: PHYSICIAN ASSISTANT
Payer: COMMERCIAL

## 2024-07-17 VITALS
HEART RATE: 82 BPM | RESPIRATION RATE: 16 BRPM | DIASTOLIC BLOOD PRESSURE: 82 MMHG | WEIGHT: 173.7 LBS | OXYGEN SATURATION: 98 % | BODY MASS INDEX: 28.04 KG/M2 | SYSTOLIC BLOOD PRESSURE: 117 MMHG | TEMPERATURE: 99.5 F

## 2024-07-17 DIAGNOSIS — Z91.89 AT HIGH RISK FOR BREAST CANCER: Primary | ICD-10-CM

## 2024-07-17 PROCEDURE — 99213 OFFICE O/P EST LOW 20 MIN: CPT | Performed by: PHYSICIAN ASSISTANT

## 2024-07-17 ASSESSMENT — PAIN SCALES - GENERAL: PAINLEVEL: NO PAIN (0)

## 2024-07-17 NOTE — NURSING NOTE
"Oncology Rooming Note    July 17, 2024 2:00 PM   Janet Ryder is a 44 year old female who presents for:    Chief Complaint   Patient presents with    Breast Cancer     RTN for Breast Lump     Initial Vitals: /82 (BP Location: Right arm, Patient Position: Right side, Cuff Size: Adult Regular)   Pulse 82   Temp 99.5  F (37.5  C) (Oral)   Resp 16   Wt 78.8 kg (173 lb 11.2 oz)   SpO2 98%   BMI 28.04 kg/m   Estimated body mass index is 28.04 kg/m  as calculated from the following:    Height as of 6/5/24: 1.676 m (5' 6\").    Weight as of this encounter: 78.8 kg (173 lb 11.2 oz). Body surface area is 1.92 meters squared.  No Pain (0) Comment: Data Unavailable   No LMP recorded. (Menstrual status: Birth Control).  Allergies reviewed: Yes  Medications reviewed: Yes    Medications: Medication refills not needed today.  Pharmacy name entered into [facility]:    [id number] - [place], [place] - [address]  (INACTIVE SEE NCPDP [id number]) [facility] - [place], [place] - [address]  [facility] - [place], [place] - [address]  [place], [place] - [address]    Frailty Screening:   Is the patient here for a new oncology consult visit in cancer care? 2. No      Clinical concerns: none       Perri Joe MA             "

## 2024-07-17 NOTE — PATIENT INSTRUCTIONS
Janet Ryder is a 44 year old woman with family history of breast cancer and ovarian cancer. She meets NCCN guidelines for high risk screening based on her family history of breast.     1) Family history of breast cancer  She meets NCCN guidelines for high risk screening based on family history with lifetime risk for breast cancer of >20%. Screening recommendations based on NCCN guidelines. Clinical encounter every 6-12 months starting now. Annual mammogram with jason alternating with annual breast MRI.  - Screening mammogram with clinic visit due 1/10/25  - Breast MRI with clinic visit due: 7/18/25    2) Lifestyle Modifications were provided.   - Maintain a healthy weight. Higher body mass index (BMI) and adult weight gain is associated with increased risk for breast cancer. This increase in risk has been attributed to increase in circulating endogenous estrogen levels from fat tissue.   - Alcohol consumption, even at moderate levels (1-2 drinks per day), increases breast cancer risk and are best avoided. If you choose to drink alcohol limit alcohol consumption to less than 1 drink per day. (1 ounce of liquor, 6 ounces of wine, or 8 ounces of beer)  - Be active daily and void being sedentary. Take part in at least 150-300 minutes of moderate-intensity physical activity per week.

## 2024-07-17 NOTE — LETTER
7/17/2024      Janet Ryder  [address]  [facility] [address]      Dear Colleague,    Thank you for referring your patient, Janet Ryder, to the [facility]. Please see a copy of my visit note below.    FOLLOW UP  Jul 17, 2024     Janet Ryder is a 43 year old woman who presents with family history of breast cancer.     HPI:    Family history of mother who had ovarian cancer. Paternal aunt with history of breast cancer. 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     She has a history of bilateral breast cyst that have been aspirated.    She continues to have bilateral breast cysts that fluctuate in size and tenderness. The cysts are currently asymptomatic and smaller in size. She started birth control pills in early 2023. She planned to try switching birth control.     She denies any skin change, nipple inversion or nipple discharge. She had her first breast MRI today.     BREAST-SPECIFIC HISTORY:    Previous breast imaging: Yes   - 12/12/18 b/l Dmammo and left breast ultrasound for pain: benign appearing cyst 10:00 BI-RADS 2, aspiration   - 8/25/21 b/l Dmammo and left breast ultrasound: 12:00 2 cm FN 3.1 cm cyst. BI -RADS 2  - 12/2/22 b/l Dmammo and right breast ultrasound for pain: simple cysts 9:00 6 cm FN and 8:00 subareolar.    - 1/9/24 Smammo BI-RADS 2    Prior breast biopsies/surgeries: No  - right (2014) and left (2018) breast cyst aspiration    Prior history of breast cancer or DCIS: No  Prior radiation history: No   Self breast exams: No  Breast density: heterogeneously dense    GYN HISTORY:  G0  Age at menarche: 14  Menopausal: premenopausal   Menopausal hormone replacement therapy: no    RISK ASSESSMENT: < 3% 5 year risk and > 20% lifetime risk   Patti: 0.7% 5 year risk   KERLINE/Georgiaer-Kellyck: 21.7% lifetime risk    FAMILY HISTORY:  Breast ca: Yes  - paternal aunt, 60's  Ovarian ca: Yes   - mother, 67  Pancreatic ca:  No  Prostate: No  Gastric ca: No  Melanoma: Yes   - maternal grandfather  Colon ca: Yes   - maternal grandfather  Other cancer: No  Other genetic, testing, syndromes, or clotting disorders: no     PAST MEDICAL HISTORY  Past Medical History:   Diagnosis Date     Atypical migraine      PAST SURGICAL HISTORY   Past Surgical History:   Procedure Laterality Date     ORTHOPEDIC SURGERY       [facility] NONSPECIFIC PROCEDURE  AGE 6, AGE 10    REMOVAL OF FACIAL SKIN CYSTS     [facility] NONSPECIFIC PROCEDURE  AGE 22    SUTURES FOR FACIAL LACERATION     MEDICATIONS  Current Outpatient Medications   Medication Sig Dispense Refill     buPROPion (WELLBUTRIN) 75 MG tablet Take 2 tablets (150 mg) by mouth 2 times daily 360 tablet 3     Cholecalciferol (VITAMIN D PO) Take 5,000 Units by mouth.       ciclopirox (PENLAC) 8 % external solution Apply to adjacent skin and affected nails daily.  Remove with alcohol every 7 days, then repeat. 19.8 mL 3     drospirenone-ethinyl estradiol (MITCHEL) 3-0.02 MG tablet Take 1 tablet by mouth daily 84 tablet 3     ferrous sulfate (FEROSUL) 325 (65 Fe) MG tablet Take 325 mg by mouth daily (with breakfast)       FLUoxetine (PROZAC) 20 MG capsule Take 1 capsule (20 mg) by mouth daily 90 capsule 0     levonorgestrel-ethinyl estradiol (AVIANE) 0.1-20 MG-MCG tablet Take 1 tablet by mouth daily In a continuous fashion X 21 days, then discard placebo week and begin next pack 90 tablet 8     metroNIDAZOLE (METROCREAM) 0.75 % external cream Apply topically 2 times daily To the cheeks and nose 135 g 3     Multiple Vitamin (DAILY MULTIVITAMIN PO) Take  by mouth.       tretinoin (RETIN-A) 0.025 % external cream Use a pea-sized amount every other night and increase to nightly as tolerated. 135 g 3   Birth control pill  Wellburtrin    ALLERGIES  Allergies   Allergen Reactions     Shellfish-Derived Products Hives     Amoxicillin      Erythromycin      No Clinical Screening - See Comments Nausea and Vomiting     Eggplant      Scopolamine      Redness and swelling of the face      SOCIAL HISTORY:  Smokes: No  EtOH: Yes, occassionally on weekends  Exercise: wants to be more active.   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     ROS:  Change in vision No  Headaches: no  Respiratory: No shortness of breath, dyspnea on exertion, cough, or hemoptysis   Cardiovascular: negative   Gastrointestinal: negative Abdominal pain: no  Breast: left breast mass  Musculoskeletal: negative Joint pain No Back pain: no  Psychiatric: negative  Hematologic/Lymphatic/Immunologic: negative  Endocrine: negative    EXAM  /82 (BP Location: Right arm, Patient Position: Right side, Cuff Size: Adult Regular)   Pulse 82   Temp 99.5  F (37.5  C) (Oral)   Resp 16   Wt 78.8 kg (173 lb 11.2 oz)   SpO2 98%   BMI 28.04 kg/m     PHYSICAL EXAM  Respiratory: breathing non labored.   Breasts: Examination was done in both the upright and supine positions.  Breasts are symmetrical . No skin or nipple changes. No nipple discharge.   Right breast 9:00 3 cm mass, stable. Right breast 3:00 1 cm mass  No clavicular, cervical, or right axillary lymphadenopathy.     INVESTIGATIONS:  7/17/24 breast MRI: per Radiology no concerning findings, final report pending.     ASSESSMENT/PLAN:    Janet Ryder is a 44 year old woman with family history of breast cancer and ovarian cancer. She meets NCCN guidelines for high risk screening based on her family history of breast.     1) Family history of breast cancer  She meets NCCN guidelines for high risk screening based on family history with lifetime risk for breast cancer of >20%. Screening recommendations based on NCCN guidelines. Clinical encounter every 6-12 months starting now. Annual mammogram with jason alternating with annual breast MRI.  - Screening mammogram with clinic visit due 1/10/25  - Breast MRI with clinic visit due: 7/18/25    2) Lifestyle Modifications were provided.   - Maintain a healthy weight. Higher body mass index (BMI) and  adult weight gain is associated with increased risk for breast cancer. This increase in risk has been attributed to increase in circulating endogenous estrogen levels from fat tissue.   - Alcohol consumption, even at moderate levels (1-2 drinks per day), increases breast cancer risk and are best avoided. If you choose to drink alcohol limit alcohol consumption to less than 1 drink per day. (1 ounce of liquor, 6 ounces of wine, or 8 ounces of beer)  - Be active daily and void being sedentary. Take part in at least 150-300 minutes of moderate-intensity physical activity per week.     Candace Santos PA-C    20 minutes spent on the date of the encounter doing chart review, review of test results, interpretation of tests, patient visit and documentation.       Again, thank you for allowing me to participate in the care of your patient.        Sincerely,        Candace Santos PA-C

## 2024-07-29 NOTE — TELEPHONE ENCOUNTER
Central Prior Authorization Team  Phone: [phone]    Medication Appeal Initiation    Medication: BUPROPION HCL 75 MG PO TABS  Appeal Start Date:  7/29/2024  Insurance Company: [employer] Phone:   Insurance Fax: [phone]  Comments:   FAXED LMN TO INSURANCE

## 2024-08-08 NOTE — TELEPHONE ENCOUNTER
Central Prior Authorization Team  Phone: [phone]    MEDICATION APPEAL APPROVED    Medication: BUPROPION HCL 75 MG PO TABS  Authorization Effective Date: 8/8/2024  Authorization Expiration Date: 8/8/2025  Approved Dose/Quantity:   Reference #:     Appeal Insurance Company:   Expected CoPay: $       CoPay Card Available:    Financial Assistance Needed:   Filling Pharmacy: [address]  Patient Notified: PB   Comments:

## 2024-08-08 NOTE — TELEPHONE ENCOUNTER
Central Prior Authorization Team  Phone: [phone]    FAXED INSURANCE AGAIN WITH URGENT STATUS TO PLEASE SEND RESPONSE.

## 2024-08-14 ENCOUNTER — OFFICE VISIT (OUTPATIENT)
Dept: FAMILY MEDICINE | Facility: CLINIC | Age: 44
End: 2024-08-14
Payer: COMMERCIAL

## 2024-08-14 VITALS
HEART RATE: 70 BPM | WEIGHT: 173 LBS | BODY MASS INDEX: 27.8 KG/M2 | DIASTOLIC BLOOD PRESSURE: 72 MMHG | HEIGHT: 66 IN | SYSTOLIC BLOOD PRESSURE: 115 MMHG | TEMPERATURE: 98.2 F | OXYGEN SATURATION: 98 % | RESPIRATION RATE: 18 BRPM

## 2024-08-14 DIAGNOSIS — N60.02 BILATERAL BREAST CYSTS: ICD-10-CM

## 2024-08-14 DIAGNOSIS — N60.01 BILATERAL BREAST CYSTS: ICD-10-CM

## 2024-08-14 DIAGNOSIS — F33.41 RECURRENT MAJOR DEPRESSIVE DISORDER, IN PARTIAL REMISSION (H): ICD-10-CM

## 2024-08-14 DIAGNOSIS — F32.1 CURRENT MODERATE EPISODE OF MAJOR DEPRESSIVE DISORDER WITHOUT PRIOR EPISODE (H): ICD-10-CM

## 2024-08-14 LAB
ALBUMIN SERPL BCG-MCNC: 4.6 G/DL (ref 3.5–5.2)
ALP SERPL-CCNC: 62 U/L (ref 40–150)
ALT SERPL W P-5'-P-CCNC: 18 U/L (ref 0–50)
ANION GAP SERPL CALCULATED.3IONS-SCNC: 12 MMOL/L (ref 7–15)
AST SERPL W P-5'-P-CCNC: 19 U/L (ref 0–45)
BILIRUB SERPL-MCNC: 0.6 MG/DL
BUN SERPL-MCNC: 9.9 MG/DL (ref 6–20)
CALCIUM SERPL-MCNC: 9.5 MG/DL (ref 8.8–10.4)
CHLORIDE SERPL-SCNC: 100 MMOL/L (ref 98–107)
CREAT SERPL-MCNC: 0.81 MG/DL (ref 0.51–0.95)
EGFRCR SERPLBLD CKD-EPI 2021: >90 ML/MIN/1.73M2
ERYTHROCYTE [DISTWIDTH] IN BLOOD BY AUTOMATED COUNT: 12.1 % (ref 10–15)
GLUCOSE SERPL-MCNC: 79 MG/DL (ref 70–99)
HCO3 SERPL-SCNC: 23 MMOL/L (ref 22–29)
HCT VFR BLD AUTO: 39 % (ref 35–47)
HGB BLD-MCNC: 12.7 G/DL (ref 11.7–15.7)
MCH RBC QN AUTO: 30.8 PG (ref 26.5–33)
MCHC RBC AUTO-ENTMCNC: 32.6 G/DL (ref 31.5–36.5)
MCV RBC AUTO: 94 FL (ref 78–100)
PLATELET # BLD AUTO: 297 10E3/UL (ref 150–450)
POTASSIUM SERPL-SCNC: 5 MMOL/L (ref 3.4–5.3)
PROT SERPL-MCNC: 7.3 G/DL (ref 6.4–8.3)
RBC # BLD AUTO: 4.13 10E6/UL (ref 3.8–5.2)
SODIUM SERPL-SCNC: 135 MMOL/L (ref 135–145)
TSH SERPL DL<=0.005 MIU/L-ACNC: 0.8 UIU/ML (ref 0.3–4.2)
WBC # BLD AUTO: 6.2 10E3/UL (ref 4–11)

## 2024-08-14 PROCEDURE — 85027 COMPLETE CBC AUTOMATED: CPT | Performed by: FAMILY MEDICINE

## 2024-08-14 PROCEDURE — 90715 TDAP VACCINE 7 YRS/> IM: CPT | Performed by: FAMILY MEDICINE

## 2024-08-14 PROCEDURE — 90471 IMMUNIZATION ADMIN: CPT | Performed by: FAMILY MEDICINE

## 2024-08-14 PROCEDURE — 99214 OFFICE O/P EST MOD 30 MIN: CPT | Mod: 25 | Performed by: FAMILY MEDICINE

## 2024-08-14 PROCEDURE — 84443 ASSAY THYROID STIM HORMONE: CPT | Performed by: FAMILY MEDICINE

## 2024-08-14 PROCEDURE — 80053 COMPREHEN METABOLIC PANEL: CPT | Performed by: FAMILY MEDICINE

## 2024-08-14 PROCEDURE — 36415 COLL VENOUS BLD VENIPUNCTURE: CPT | Performed by: FAMILY MEDICINE

## 2024-08-14 RX ORDER — BUPROPION HYDROCHLORIDE 75 MG/1
150 TABLET ORAL 2 TIMES DAILY
Qty: 360 TABLET | Refills: 3 | Status: SHIPPED | OUTPATIENT
Start: 2024-08-14

## 2024-08-14 RX ORDER — DROSPIRENONE AND ETHINYL ESTRADIOL 0.02-3(28)
1 KIT ORAL DAILY
Qty: 90 TABLET | Refills: 3 | Status: SHIPPED | OUTPATIENT
Start: 2024-08-14 | End: 2024-08-27

## 2024-08-14 ASSESSMENT — PATIENT HEALTH QUESTIONNAIRE - PHQ9: SUM OF ALL RESPONSES TO PHQ QUESTIONS 1-9: 15

## 2024-08-14 ASSESSMENT — PAIN SCALES - GENERAL: PAINLEVEL: NO PAIN (0)

## 2024-08-14 NOTE — PATIENT INSTRUCTIONS
Here is the website for [facility] that have a sliding fee schedule for those who are uninsured.     [email or web address]  Patient Education   Here is the plan from today's visit    1. Current moderate episode of major depressive disorder without prior episode (H)  - FLUoxetine (PROZAC) 20 MG capsule; Take 1 capsule (20 mg) by mouth daily  Dispense: 90 capsule; Refill: 3    2. Bilateral breast cysts  - drospirenone-ethinyl estradiol (MITCHEL) 3-0.02 MG tablet; Take 1 tablet by mouth daily  Dispense: 90 tablet; Refill: 3    3. Recurrent major depressive disorder, in partial remission (H24)  - buPROPion (WELLBUTRIN) 75 MG tablet; Take 2 tablets (150 mg) by mouth 2 times daily  Dispense: 360 tablet; Refill: 3    4. Breast and ovarian cancer risk  I can help out with ordering the tests you need for breast cancer screening while you look for a provider to manage both.   The Mitchel is protective against ovarian cancer.         Please call or return to clinic if your symptoms don't go away.    Follow up plan  No follow-ups on file.    Thank you for coming to [facility] today.  Lab Testing:  **If you had lab testing today and your results are reassuring or normal they will be mailed to you or sent through [employer] within 7 days.   **If the lab tests need quick action we will call you with the results.  **If you are having labs done on a different day, please call [phone] to schedule at [facility] or [phone] for other [facility] locations. Labs do not offer walk-in appointments.  The phone number we will call with results is # [phone] (home) . If this is not the best number please call our clinic and change the number.  Medication Refills:  If you need any refills please call your pharmacy and they will contact us.   If you need to  your refill at a new pharmacy, please contact the new pharmacy directly. The new pharmacy will help you get your medications transferred faster.    Scheduling:  If you have any concerns about today's visit or wish to schedule another appointment please call our office during normal business hours [phone] (8-5:00 M-F). If you can no longer make a scheduled visit, please cancel via [employer] or call us to cancel.   If a referral was made to an [facility] specialty provider and you do not get a call from central scheduling, please refer to directions on your visit summary or call our office during normal business hours for assistance.   If a Mammogram was ordered for you at the Breast Center call [phone] to schedule or change your appointment.  If you had an XRay/CT/Ultrasound/MRI ordered the number is [phone] to schedule or change your radiology appointment.   [facility] has limited ultrasound appointments available on Wednesdays, if you would like your ultrasound at [facility], please call [phone] to schedule.   Medical Concerns:  If you have urgent medical concerns please call [phone] at any time of the day.    Leatha Robles MD

## 2024-08-14 NOTE — PROGRESS NOTES
Assessment & Plan     Current moderate episode of major depressive disorder without prior episode (H)  Is doing better on combination of Prozac with Wellbutrin. Had been only on the Wellbutrin before and found that she was really struggling to do things, no energy, fatigued. Now less so.  Plan to continue on this regimen for the next 9 months or longer.    - FLUoxetine (PROZAC) 20 MG capsule; Take 1 capsule (20 mg) by mouth daily  - Comprehensive metabolic panel  - CBC with platelets  - TSH    Bilateral breast cysts  Treating this with daily Mitchel. The cysts are very painful. Aware of her higher breast cancer risk and is looking for women's health provider to manage.    - drospirenone-ethinyl estradiol (MITCHEL) 3-0.02 MG tablet; Take 1 tablet by mouth daily    Recurrent major depressive disorder, in partial remission (H24)  Continue.  Is on short acting because of diffuse muscle aches the two times she tried to use the long acting version.   - buPROPion (WELLBUTRIN) 75 MG tablet; Take 2 tablets (150 mg) by mouth 2 times daily    At risk for breast and ovarian cancer  Looking for how to optimally screen for ovarian cancer. Reviewed with her that the Mitchel helps decrease ovarian cancer risk.  Is getting MRIs and mammograms regularly to screen for breast cancer. She will look for providers.     Also reviewed that FQHCs are available during the time she doesn't have insurance.             No follow-ups on file.    Irvin Gambino is a 44 year old, presenting for the following health issues:  RECHECK (med)      8/14/2024     3:03 PM   Additional Questions   Roomed by Kinga   Accompanied by self         8/14/2024    Information    services provided? No    No       Multiple values from one day are sorted in reverse-chronological order     HPI       Lost her job and will loses her insurance by the end of the month.    Uses the online pharmacy that does not use insurance. Asking for a years refill to go  "to [facility].   The Sarina is delayed due to shipping.     MDD -   Was doing \"ok\" prior to finding out she was laid off. Work had gotten a bit better with prior boss being laid off.  Has not quite processed her being laid off herself. Not sure what she is going to do.  Improved today to score of 15. No SI.   Would like to stay on the same regimen.         4/29/2020     1:28 PM 10/30/2020     3:29 PM 6/5/2024     3:07 PM   PHQ   PHQ-9 Total Score 9 11 19   Q9: Thoughts of better off dead/self-harm past 2 weeks Not at all Not at all Not at all        Wanting to see an OB gyn - has a genetic predisposition for breast and ovarian cancer. Review of her recent genetic visit notes she should undergo regular breast MRI and mammograms with clinical exam. Does not outline how to monitor ovarian cancer. No need for pap til 5/2028                  Objective    /72   Pulse 70   Temp 98.2  F (36.8  C) (Oral)   Resp 18   Ht 1.676 m (5' 6\")   Wt 78.5 kg (173 lb)   SpO2 98%   BMI 27.92 kg/m    Body mass index is 27.92 kg/m .  Physical Exam               Signed Electronically by: Leatha Robles MD    "

## 2024-08-19 DIAGNOSIS — Z53.9 ERRONEOUS ENCOUNTER--DISREGARD: Primary | ICD-10-CM

## 2024-08-27 DIAGNOSIS — N60.02 BILATERAL BREAST CYSTS: ICD-10-CM

## 2024-08-27 DIAGNOSIS — N60.01 BILATERAL BREAST CYSTS: ICD-10-CM

## 2024-08-27 RX ORDER — DROSPIRENONE AND ETHINYL ESTRADIOL 0.02-3(28)
1 KIT ORAL DAILY
Qty: 90 TABLET | Refills: 3 | Status: SHIPPED | OUTPATIENT
Start: 2024-08-27

## 2024-08-27 NOTE — TELEPHONE ENCOUNTER
"Request for medication refill:    drospirenone-ethinyl estradiol (MITCHEL) 3-0.02 MG tablet     Providers if patient needs an appointment and you are willing to give a one month supply please refill for one month and  send a letter/MyChart using \".SMILLIMITEDREFILL\" .smillimited and route chart to \"P SMI \" (Giving one month refill in non controlled medications is strongly recommended before denial)    If refill has been denied, meaning absolutely no refills without visit, please complete the smart phrase \".smirxrefuse\" and route it to the \"P SMI MED REFILLS\"  pool to inform the patient and the pharmacy.    Yoko Bess MA     "

## 2024-12-10 ENCOUNTER — PATIENT OUTREACH (OUTPATIENT)
Dept: CARE COORDINATION | Facility: CLINIC | Age: 44
End: 2024-12-10
Payer: COMMERCIAL

## 2025-01-14 NOTE — PROGRESS NOTES
FOLLOW UP  Jan 16, 2025     Janet Ryder is a 44 year old woman who presents with family history of breast cancer.     HPI:    Family history of mother who had ovarian cancer. Paternal aunt with history of breast cancer. 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 She reports she read her VUS is now thought to be significant. She has not discussed this with genetic counseling.     She has a history of bilateral breast cyst that have been aspirated.    She continues to have bilateral breast cysts that fluctuate in size and tenderness. The cysts are currently asymptomatic and smaller in size. She started birth control pills in early 2023.     She denies any new mass, skin change, nipple inversion or nipple discharge.     BREAST-SPECIFIC HISTORY:    Previous breast imaging: Yes   - 12/12/18 b/l Dmammo and left breast ultrasound for pain: benign appearing cyst 10:00 BI-RADS 2, aspiration   - 8/25/21 b/l Dmammo and left breast ultrasound: 12:00 2 cm FN 3.1 cm cyst. BI -RADS 2  - 12/2/22 b/l Dmammo and right breast ultrasound for pain: simple cysts 9:00 6 cm FN and 8:00 subareolar.    - 1/9/24 Smammo BI-RADS 2  - 7/16/24 breast MRI BI-RADS 2  - 1/16/25 Smammo    Prior breast biopsies/surgeries: No  - right (2014) and left (2018) breast cyst aspiration    Prior history of breast cancer or DCIS: No  Prior radiation history: No   Self breast exams: No  Breast density: heterogeneously dense    GYN HISTORY:  G0  Age at menarche: 14  Menopausal: premenopausal   Menopausal hormone replacement therapy: no    RISK ASSESSMENT: < 3% 5 year risk and > 20% lifetime risk   Patti: 0.7% 5 year risk   KERLINE/Georgiaer-Kerrizick: 21.7% lifetime risk    FAMILY HISTORY:  Breast ca: Yes  - paternal aunt, 60's  Ovarian ca: Yes   - mother, 67  Pancreatic ca: No  Prostate: No  Gastric ca: No  Melanoma: Yes   - maternal grandfather  Colon ca: Yes   - maternal grandfather  Other cancer: No  Other genetic,  testing, syndromes, or clotting disorders: no     PAST MEDICAL HISTORY  Past Medical History:   Diagnosis Date    Atypical migraine      PAST SURGICAL HISTORY   Past Surgical History:   Procedure Laterality Date    ORTHOPEDIC SURGERY      [facility] NONSPECIFIC PROCEDURE  AGE 6, AGE 10    REMOVAL OF FACIAL SKIN CYSTS    [facility] NONSPECIFIC PROCEDURE  AGE 22    SUTURES FOR FACIAL LACERATION     MEDICATIONS  Current Outpatient Medications   Medication Sig Dispense Refill    buPROPion (WELLBUTRIN) 75 MG tablet Take 2 tablets (150 mg) by mouth 2 times daily 360 tablet 3    Cholecalciferol (VITAMIN D PO) Take 5,000 Units by mouth.      ciclopirox (PENLAC) 8 % external solution Apply to adjacent skin and affected nails daily.  Remove with alcohol every 7 days, then repeat. 19.8 mL 3    drospirenone-ethinyl estradiol (MITCHEL) 3-0.02 MG tablet Take 1 tablet by mouth daily. 90 tablet 3    ferrous sulfate (FEROSUL) 325 (65 Fe) MG tablet Take 325 mg by mouth daily (with breakfast)      FLUoxetine (PROZAC) 20 MG capsule Take 1 capsule (20 mg) by mouth daily 90 capsule 3    metroNIDAZOLE (METROCREAM) 0.75 % external cream Apply topically 2 times daily To the cheeks and nose 135 g 3    Multiple Vitamin (DAILY MULTIVITAMIN PO) Take  by mouth.      tretinoin (RETIN-A) 0.025 % external cream Use a pea-sized amount every other night and increase to nightly as tolerated. 135 g 3   Birth control pill  Wellburtrin    ALLERGIES  Allergies   Allergen Reactions    Shellfish-Derived Products Hives    Amoxicillin     Erythromycin     No Clinical Screening - See Comments Nausea and Vomiting     Eggplant    Scopolamine      Redness and swelling of the face      SOCIAL HISTORY:  Smokes: No  EtOH: Yes, occassionally on weekends  Exercise: active  Works in banking, switching jobs    ROS:  Change in vision No  Headaches: no  Respiratory: No shortness of breath, dyspnea on exertion, cough, or hemoptysis   Cardiovascular: negative   Gastrointestinal: negative  Abdominal pain: no  Breast: left breast mass  Musculoskeletal: negative Joint pain No Back pain: no  Psychiatric: negative  Hematologic/Lymphatic/Immunologic: negative  Endocrine: negative    EXAM  /72 (BP Location: Right arm, Patient Position: Sitting, Cuff Size: Adult Regular)   Pulse 81   Temp 98.1  F (36.7  C) (Oral)   Resp 16   Wt 79.7 kg (175 lb 12.8 oz)   SpO2 98%   BMI 28.37 kg/m     PHYSICAL EXAM  Respiratory: breathing non labored.   Breasts: Examination was done in both the upright and supine positions.  Breasts are symmetrical . No skin or nipple changes. No nipple discharge.   Right breast 9:00 2.5 cm mass, smaller.   Left breast 9:00 5 mm mass, smaller.   Left breast 12:00 3 cm mass stable.   No clavicular, cervical, or right axillary lymphadenopathy.     INVESTIGATIONS:  1/16/25 screening mammogram: per Radiology no concerning findings, final report pending.     ASSESSMENT/PLAN:    Janet Ryder is a 44 year old woman with family history of breast cancer and ovarian cancer. She meets NCCN guidelines for high risk screening based on her family history of breast.     1) Family history of breast cancer  She meets NCCN guidelines for high risk screening based on family history with lifetime risk for breast cancer of >20%. Screening recommendations based on NCCN guidelines. Clinical encounter every 6-12 months starting now. Annual mammogram with jason alternating with annual breast MRI.  - Breast MRI with clinic visit due: 7/18/25  - Screening mammogram with clinic visit due 1/17/26    2) Lifestyle Modifications were provided.   - Maintain a healthy weight. Higher body mass index (BMI) and adult weight gain is associated with increased risk for breast cancer. This increase in risk has been attributed to increase in circulating endogenous estrogen levels from fat tissue.   - Alcohol consumption, even at moderate levels (1-2 drinks per day), increases breast cancer risk and are best avoided. If  you choose to drink alcohol limit alcohol consumption to less than 1 drink per day. (1 ounce of liquor, 6 ounces of wine, or 8 ounces of beer)  - Be active daily and void being sedentary. Take part in at least 150-300 minutes of moderate-intensity physical activity per week    3) Follow up with genetic counseling regarding RAD51D    Candace Santos PA-C    20 minutes spent on the date of the encounter doing chart review, review of test results, interpretation of tests, patient visit and documentation.

## 2025-01-16 ENCOUNTER — ONCOLOGY VISIT (OUTPATIENT)
Dept: SURGERY | Facility: CLINIC | Age: 45
End: 2025-01-16
Attending: PHYSICIAN ASSISTANT
Payer: COMMERCIAL

## 2025-01-16 VITALS
WEIGHT: 175.8 LBS | RESPIRATION RATE: 16 BRPM | TEMPERATURE: 98.1 F | SYSTOLIC BLOOD PRESSURE: 104 MMHG | DIASTOLIC BLOOD PRESSURE: 72 MMHG | OXYGEN SATURATION: 98 % | BODY MASS INDEX: 28.37 KG/M2 | HEART RATE: 81 BPM

## 2025-01-16 DIAGNOSIS — Z91.89 AT HIGH RISK FOR BREAST CANCER: Primary | ICD-10-CM

## 2025-01-16 PROCEDURE — 99213 OFFICE O/P EST LOW 20 MIN: CPT | Performed by: PHYSICIAN ASSISTANT

## 2025-01-16 ASSESSMENT — PAIN SCALES - GENERAL: PAINLEVEL_OUTOF10: NO PAIN (0)

## 2025-01-16 NOTE — LETTER
1/16/2025      Madhuri Ryder  [address]  [facility] [address]      Dear Colleague,    Thank you for referring your patient, Madhuri Ryder, to the [facility]. Please see a copy of my visit note below.    FOLLOW UP  Jan 16, 2025     Janet Ryder is a 44 year old woman who presents with family history of breast cancer.     HPI:    Family history of mother who had ovarian cancer. Paternal aunt with history of breast cancer. 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 She reports she read her VUS is now thought to be significant. She has not discussed this with genetic counseling.     She has a history of bilateral breast cyst that have been aspirated.    She continues to have bilateral breast cysts that fluctuate in size and tenderness. The cysts are currently asymptomatic and smaller in size. She started birth control pills in early 2023.     She denies any new mass, skin change, nipple inversion or nipple discharge.     BREAST-SPECIFIC HISTORY:    Previous breast imaging: Yes   - 12/12/18 b/l Dmammo and left breast ultrasound for pain: benign appearing cyst 10:00 BI-RADS 2, aspiration   - 8/25/21 b/l Dmammo and left breast ultrasound: 12:00 2 cm FN 3.1 cm cyst. BI -RADS 2  - 12/2/22 b/l Dmammo and right breast ultrasound for pain: simple cysts 9:00 6 cm FN and 8:00 subareolar.    - 1/9/24 Smammo BI-RADS 2  - 7/16/24 breast MRI BI-RADS 2  - 1/16/25 Smammo    Prior breast biopsies/surgeries: No  - right (2014) and left (2018) breast cyst aspiration    Prior history of breast cancer or DCIS: No  Prior radiation history: No   Self breast exams: No  Breast density: heterogeneously dense    GYN HISTORY:  G0  Age at menarche: 14  Menopausal: premenopausal   Menopausal hormone replacement therapy: no    RISK ASSESSMENT: < 3% 5 year risk and > 20% lifetime risk   Patti: 0.7% 5 year risk   KERLINE/Tyrer-Cuzick: 21.7% lifetime  risk    FAMILY HISTORY:  Breast ca: Yes  - paternal aunt, 60's  Ovarian ca: Yes   - mother, 67  Pancreatic ca: No  Prostate: No  Gastric ca: No  Melanoma: Yes   - maternal grandfather  Colon ca: Yes   - maternal grandfather  Other cancer: No  Other genetic, testing, syndromes, or clotting disorders: no     PAST MEDICAL HISTORY  Past Medical History:   Diagnosis Date     Atypical migraine      PAST SURGICAL HISTORY   Past Surgical History:   Procedure Laterality Date     ORTHOPEDIC SURGERY       [facility] NONSPECIFIC PROCEDURE  AGE 6, AGE 10    REMOVAL OF FACIAL SKIN CYSTS     [facility] NONSPECIFIC PROCEDURE  AGE 22    SUTURES FOR FACIAL LACERATION     MEDICATIONS  Current Outpatient Medications   Medication Sig Dispense Refill     buPROPion (WELLBUTRIN) 75 MG tablet Take 2 tablets (150 mg) by mouth 2 times daily 360 tablet 3     Cholecalciferol (VITAMIN D PO) Take 5,000 Units by mouth.       ciclopirox (PENLAC) 8 % external solution Apply to adjacent skin and affected nails daily.  Remove with alcohol every 7 days, then repeat. 19.8 mL 3     drospirenone-ethinyl estradiol (MITCHEL) 3-0.02 MG tablet Take 1 tablet by mouth daily. 90 tablet 3     ferrous sulfate (FEROSUL) 325 (65 Fe) MG tablet Take 325 mg by mouth daily (with breakfast)       FLUoxetine (PROZAC) 20 MG capsule Take 1 capsule (20 mg) by mouth daily 90 capsule 3     metroNIDAZOLE (METROCREAM) 0.75 % external cream Apply topically 2 times daily To the cheeks and nose 135 g 3     Multiple Vitamin (DAILY MULTIVITAMIN PO) Take  by mouth.       tretinoin (RETIN-A) 0.025 % external cream Use a pea-sized amount every other night and increase to nightly as tolerated. 135 g 3   Birth control pill  Wellburtrin    ALLERGIES  Allergies   Allergen Reactions     Shellfish-Derived Products Hives     Amoxicillin      Erythromycin      No Clinical Screening - See Comments Nausea and Vomiting     Eggplant     Scopolamine      Redness and swelling of the face      SOCIAL HISTORY:  Smokes:  No  EtOH: Yes, occassionally on weekends  Exercise: active  Works in banking, switching jobs    ROS:  Change in vision No  Headaches: no  Respiratory: No shortness of breath, dyspnea on exertion, cough, or hemoptysis   Cardiovascular: negative   Gastrointestinal: negative Abdominal pain: no  Breast: left breast mass  Musculoskeletal: negative Joint pain No Back pain: no  Psychiatric: negative  Hematologic/Lymphatic/Immunologic: negative  Endocrine: negative    EXAM  /72 (BP Location: Right arm, Patient Position: Sitting, Cuff Size: Adult Regular)   Pulse 81   Temp 98.1  F (36.7  C) (Oral)   Resp 16   Wt 79.7 kg (175 lb 12.8 oz)   SpO2 98%   BMI 28.37 kg/m     PHYSICAL EXAM  Respiratory: breathing non labored.   Breasts: Examination was done in both the upright and supine positions.  Breasts are symmetrical . No skin or nipple changes. No nipple discharge.   Right breast 9:00 2.5 cm mass, smaller.   Left breast 9:00 5 mm mass, smaller.   Left breast 12:00 3 cm mass stable.   No clavicular, cervical, or right axillary lymphadenopathy.     INVESTIGATIONS:  1/16/25 screening mammogram: per Radiology no concerning findings, final report pending.     ASSESSMENT/PLAN:    Janet Ryder is a 44 year old woman with family history of breast cancer and ovarian cancer. She meets NCCN guidelines for high risk screening based on her family history of breast.     1) Family history of breast cancer  She meets NCCN guidelines for high risk screening based on family history with lifetime risk for breast cancer of >20%. Screening recommendations based on NCCN guidelines. Clinical encounter every 6-12 months starting now. Annual mammogram with jason alternating with annual breast MRI.  - Breast MRI with clinic visit due: 7/18/25  - Screening mammogram with clinic visit due 1/17/26    2) Lifestyle Modifications were provided.   - Maintain a healthy weight. Higher body mass index (BMI) and adult weight gain is associated with  increased risk for breast cancer. This increase in risk has been attributed to increase in circulating endogenous estrogen levels from fat tissue.   - Alcohol consumption, even at moderate levels (1-2 drinks per day), increases breast cancer risk and are best avoided. If you choose to drink alcohol limit alcohol consumption to less than 1 drink per day. (1 ounce of liquor, 6 ounces of wine, or 8 ounces of beer)  - Be active daily and void being sedentary. Take part in at least 150-300 minutes of moderate-intensity physical activity per week    3) Follow up with genetic counseling regarding RAD51D    Candace Santos PA-C    20 minutes spent on the date of the encounter doing chart review, review of test results, interpretation of tests, patient visit and documentation.       Again, thank you for allowing me to participate in the care of your patient.        Sincerely,        Candace Santos PA-C    Electronically signed

## 2025-01-16 NOTE — NURSING NOTE
"Oncology Rooming Note    January 16, 2025 10:00 AM   Madhuri Ryder is a 44 year old female who presents for:    Chief Complaint   Patient presents with    Oncology Clinic Visit     At high risk for breast cancer     Initial Vitals: /72 (BP Location: Right arm, Patient Position: Sitting, Cuff Size: Adult Regular)   Pulse 81   Temp 98.1  F (36.7  C) (Oral)   Resp 16   Wt 79.7 kg (175 lb 12.8 oz)   SpO2 98%   BMI 28.37 kg/m   Estimated body mass index is 28.37 kg/m  as calculated from the following:    Height as of 8/14/24: 1.676 m (5' 6\").    Weight as of this encounter: 79.7 kg (175 lb 12.8 oz). Body surface area is 1.93 meters squared.  No Pain (0) Comment: Data Unavailable   No LMP recorded. (Menstrual status: Birth Control).  Allergies reviewed: Yes  Medications reviewed: Yes    Medications: MEDICATION REFILLS NEEDED TODAY. Provider was notified.  Pharmacy name entered into [employer]:    [id number] [place], [place] - [address] [place] [address]  (INACTIVE SEE NCPDP [id number]) [facility] - [place], [place] - [address]  "[facility] - [place], [place] - [address]  [place], [place] - [address]    Frailty Screening:   Is the patient here for a new oncology consult visit in cancer care? 2. No      Clinical concerns: needs refill for drospirenone-ethinyl estradiol.     Pt reports getting recurring cysts.     Mook Rey              "

## 2025-01-16 NOTE — PATIENT INSTRUCTIONS
Janet Ryder is a 44 year old woman with family history of breast cancer and ovarian cancer. She meets NCCN guidelines for high risk screening based on her family history of breast.     1) Family history of breast cancer  She meets NCCN guidelines for high risk screening based on family history with lifetime risk for breast cancer of >20%. Screening recommendations based on NCCN guidelines. Clinical encounter every 6-12 months starting now. Annual mammogram with jason alternating with annual breast MRI.  - Breast MRI with clinic visit due: 7/18/25  - Screening mammogram with clinic visit due 1/17/26    2) Lifestyle Modifications were provided.   - Maintain a healthy weight. Higher body mass index (BMI) and adult weight gain is associated with increased risk for breast cancer. This increase in risk has been attributed to increase in circulating endogenous estrogen levels from fat tissue.   - Alcohol consumption, even at moderate levels (1-2 drinks per day), increases breast cancer risk and are best avoided. If you choose to drink alcohol limit alcohol consumption to less than 1 drink per day. (1 ounce of liquor, 6 ounces of wine, or 8 ounces of beer)  - Be active daily and void being sedentary. Take part in at least 150-300 minutes of moderate-intensity physical activity per week    3) Follow up with genetic counseling regarding RAD51D

## 2025-03-26 ENCOUNTER — OFFICE VISIT (OUTPATIENT)
Dept: FAMILY MEDICINE | Facility: CLINIC | Age: 45
End: 2025-03-26
Payer: COMMERCIAL

## 2025-03-26 VITALS
WEIGHT: 176.7 LBS | HEIGHT: 66 IN | OXYGEN SATURATION: 100 % | HEART RATE: 78 BPM | DIASTOLIC BLOOD PRESSURE: 74 MMHG | SYSTOLIC BLOOD PRESSURE: 110 MMHG | RESPIRATION RATE: 17 BRPM | TEMPERATURE: 97.8 F | BODY MASS INDEX: 28.4 KG/M2

## 2025-03-26 DIAGNOSIS — N60.02 BILATERAL BREAST CYSTS: ICD-10-CM

## 2025-03-26 DIAGNOSIS — N60.01 BILATERAL BREAST CYSTS: ICD-10-CM

## 2025-03-26 DIAGNOSIS — H69.91 DYSFUNCTION OF RIGHT EUSTACHIAN TUBE: Primary | ICD-10-CM

## 2025-03-26 RX ORDER — DROSPIRENONE AND ETHINYL ESTRADIOL 0.02-3(28)
1 KIT ORAL DAILY
Qty: 84 TABLET | Refills: 3 | Status: SHIPPED | OUTPATIENT
Start: 2025-03-26

## 2025-03-26 ASSESSMENT — PAIN SCALES - GENERAL: PAINLEVEL_OUTOF10: MODERATE PAIN (5)

## 2025-03-26 ASSESSMENT — PATIENT HEALTH QUESTIONNAIRE - PHQ9
10. IF YOU CHECKED OFF ANY PROBLEMS, HOW DIFFICULT HAVE THESE PROBLEMS MADE IT FOR YOU TO DO YOUR WORK, TAKE CARE OF THINGS AT HOME, OR GET ALONG WITH OTHER PEOPLE: SOMEWHAT DIFFICULT
SUM OF ALL RESPONSES TO PHQ QUESTIONS 1-9: 10
SUM OF ALL RESPONSES TO PHQ QUESTIONS 1-9: 10

## 2025-03-26 NOTE — PROGRESS NOTES
"  Assessment & Plan     Dysfunction of right eustachian tube  Reviewed what this is. Recommended flonase for now and autoinsuflation. Can refer on to ENT if not better in the next 3 months or so.     Bilateral breast cysts  Refilled so correct. Also at risk for ovarian cancer so will get back to her on options for screening, if there are any.  - drospirenone-ethinyl estradiol (MITCHEL) 3-0.02 MG tablet; Take 1 tablet by mouth daily.    The longitudinal plan of care for the diagnosis(es)/condition(s) as documented were addressed during this visit. Due to the added complexity in care, I will continue to support Madhuri Pablo in the subsequent management and with ongoing continuity of care.      BMI  Estimated body mass index is 28.52 kg/m  as calculated from the following:    Height as of this encounter: 1.676 m (5' 6\").    Weight as of this encounter: 80.2 kg (176 lb 11.2 oz).             No follow-ups on file.    Subjective   Madhuri Pablo is a 45 year old, presenting for the following health issues:  Otitis Media (Was sick about a month feb 19/20  is ago - right ear has been bothersome. No vertigo, )      3/26/2025     3:24 PM   Additional Questions   Roomed by Kinga   Accompanied by self         3/26/2025    Information    services provided? No     HPI      Needs OCPs for continued treatment of her cysts.     Right ear pain since her birthday. Started after URI.    Can pop her ears now but doesn't feel like can reset it. When she blows her nose can hear a squeak of air go through.    Worse at night.   When on the plane it hurt so bad almost started crying.                  Objective    /74   Pulse 78   Temp 97.8  F (36.6  C) (Oral)   Resp 17   Ht 1.676 m (5' 6\")   Wt 80.2 kg (176 lb 11.2 oz)   SpO2 100%   BMI 28.52 kg/m    Body mass index is 28.52 kg/m .  Physical Exam   Right TM; normal with possibly a bit of retraction.   Nares: some increased turbinate swelling on the right.           "     Signed Electronically by: Leatha Robles MD    Answers submitted by the patient for this visit:  Patient Health Questionnaire (Submitted on 3/26/2025)  If you checked off any problems, how difficult have these problems made it for you to do your work, take care of things at home, or get along with other people?: Somewhat difficult  PHQ9 TOTAL SCORE: 10

## 2025-03-27 NOTE — PATIENT INSTRUCTIONS
Patient Education   Here is the plan from today's visit    1. Dysfunction of right eustachian tube (Primary)  Here is what I found:  Patients can be instructed in performing a modified Valsalva maneuver. Patients are instructed to hold their nose and mouth closed, slowly and gently elevate intranasal pressure with a sustained, gentle nose-blow and a simultaneous swallow at the point of maximum pressure, which activates the dilatory muscles of the ET. This maneuver can performed as needed or on a regular basis and can be particularly helpful for patients who experience symptoms with chris-challenge.   I could not find anything on what is too much. I think as long as you are doing things slowly, gently equalizing the pressure, you can't hurt yourself.     How long should you wait? The recommendation for adults is 3 - 12 months.  So - if at 3 months you are getting better but not fully, I would expect to continue to get better. If at 3 months you are just the same and no better, then get back to me and I can refer.       2. Bilateral breast cysts  Refilled your medication    - drospirenone-ethinyl estradiol (MITCHEL) 3-0.02 MG tablet; Take 1 tablet by mouth daily.  Dispense: 84 tablet; Refill: 3          Please call or return to clinic if your symptoms don't go away.    Follow up plan  No follow-ups on file.    Thank you for coming to [facility] today.  Lab Testing:  **If you had lab testing today and your results are reassuring or normal they will be mailed to you or sent through [employer] within 7 days.   **If the lab tests need quick action we will call you with the results.  **If you are having labs done on a different day, please call [phone] to schedule at [facility] or [phone] for other [facility] locations. Labs do not offer walk-in appointments.  The phone number we will call with results is # [phone] (home) . If this is not the best number please call our clinic and change  the number.  Medication Refills:  If you need any refills please call your pharmacy and they will contact us.   If you need to  your refill at a new pharmacy, please contact the new pharmacy directly. The new pharmacy will help you get your medications transferred faster.   Scheduling:  If you have any concerns about today's visit or wish to schedule another appointment please call our office during normal business hours [phone] (8-5:00 M-F). If you can no longer make a scheduled visit, please cancel via [employer] or call us to cancel.   If a referral was made to an [facility] specialty provider and you do not get a call from central scheduling, please refer to directions on your visit summary or call our office during normal business hours for assistance.   If a Mammogram was ordered for you at the Breast Center call [phone] to schedule or change your appointment.  If you had an XRay/CT/Ultrasound/MRI ordered the number is [phone] to schedule or change your radiology appointment.   [facility] has limited ultrasound appointments available on Wednesdays, if you would like your ultrasound at [facility], please call [phone] to schedule.   Medical Concerns:  If you have urgent medical concerns please call [phone] at any time of the day.    Leatha Robles MD

## 2025-07-12 ENCOUNTER — HEALTH MAINTENANCE LETTER (OUTPATIENT)
Age: 45
End: 2025-07-12

## 2025-07-24 ENCOUNTER — ANCILLARY PROCEDURE (OUTPATIENT)
Dept: MRI IMAGING | Facility: CLINIC | Age: 45
End: 2025-07-24
Attending: PHYSICIAN ASSISTANT
Payer: COMMERCIAL

## 2025-07-24 DIAGNOSIS — Z91.89 AT HIGH RISK FOR BREAST CANCER: ICD-10-CM

## 2025-07-24 PROCEDURE — A9585 GADOBUTROL INJECTION: HCPCS | Mod: JW | Performed by: RADIOLOGY

## 2025-07-24 PROCEDURE — 77049 MRI BREAST C-+ W/CAD BI: CPT | Performed by: RADIOLOGY

## 2025-07-24 RX ORDER — GADOBUTROL 604.72 MG/ML
10 INJECTION INTRAVENOUS ONCE
Status: COMPLETED | OUTPATIENT
Start: 2025-07-24 | End: 2025-07-24

## 2025-07-24 RX ADMIN — GADOBUTROL 8 ML: 604.72 INJECTION INTRAVENOUS at 11:30

## 2025-08-08 ENCOUNTER — ANCILLARY PROCEDURE (OUTPATIENT)
Dept: GENERAL RADIOLOGY | Facility: CLINIC | Age: 45
End: 2025-08-08
Attending: FAMILY MEDICINE
Payer: COMMERCIAL

## 2025-08-08 DIAGNOSIS — M54.6 CHRONIC RIGHT-SIDED THORACIC BACK PAIN: ICD-10-CM

## 2025-08-08 DIAGNOSIS — G89.29 CHRONIC RIGHT-SIDED THORACIC BACK PAIN: ICD-10-CM

## 2025-08-08 PROCEDURE — 71046 X-RAY EXAM CHEST 2 VIEWS: CPT | Mod: FY | Performed by: RADIOLOGY

## 2025-08-11 DIAGNOSIS — Z80.41 FAMILY HISTORY OF MALIGNANT NEOPLASM OF OVARY: Primary | ICD-10-CM

## 2025-08-11 DIAGNOSIS — T88.7XXA MEDICATION SIDE EFFECTS: ICD-10-CM

## 2025-08-12 ENCOUNTER — PATIENT OUTREACH (OUTPATIENT)
Dept: ONCOLOGY | Facility: CLINIC | Age: 45
End: 2025-08-12
Payer: COMMERCIAL

## 2025-08-12 DIAGNOSIS — Z80.41 FAMILY HISTORY OF MALIGNANT NEOPLASM OF OVARY: ICD-10-CM

## 2025-08-12 DIAGNOSIS — Z80.3 FAMILY HISTORY OF MALIGNANT NEOPLASM OF BREAST: Primary | ICD-10-CM

## 2025-08-13 ENCOUNTER — PATIENT OUTREACH (OUTPATIENT)
Dept: ONCOLOGY | Facility: CLINIC | Age: 45
End: 2025-08-13
Payer: COMMERCIAL

## (undated) RX ORDER — LIDOCAINE HYDROCHLORIDE 10 MG/ML
INJECTION, SOLUTION EPIDURAL; INFILTRATION; INTRACAUDAL; PERINEURAL
Status: DISPENSED
Start: 2018-09-21